# Patient Record
Sex: FEMALE | Race: OTHER | HISPANIC OR LATINO | URBAN - METROPOLITAN AREA
[De-identification: names, ages, dates, MRNs, and addresses within clinical notes are randomized per-mention and may not be internally consistent; named-entity substitution may affect disease eponyms.]

---

## 2024-10-17 ENCOUNTER — INPATIENT (INPATIENT)
Facility: HOSPITAL | Age: 64
LOS: 10 days | Discharge: HOME | DRG: 40 | End: 2024-10-28
Attending: INTERNAL MEDICINE | Admitting: STUDENT IN AN ORGANIZED HEALTH CARE EDUCATION/TRAINING PROGRAM
Payer: COMMERCIAL

## 2024-10-17 VITALS
HEART RATE: 113 BPM | OXYGEN SATURATION: 95 % | TEMPERATURE: 100 F | WEIGHT: 102.07 LBS | RESPIRATION RATE: 20 BRPM | SYSTOLIC BLOOD PRESSURE: 116 MMHG | DIASTOLIC BLOOD PRESSURE: 68 MMHG

## 2024-10-17 LAB
ALBUMIN SERPL ELPH-MCNC: 2.1 G/DL — LOW (ref 3.5–5.2)
ALP SERPL-CCNC: 112 U/L — SIGNIFICANT CHANGE UP (ref 30–115)
ALT FLD-CCNC: <5 U/L — SIGNIFICANT CHANGE UP (ref 0–41)
ANION GAP SERPL CALC-SCNC: 7 MMOL/L — SIGNIFICANT CHANGE UP (ref 7–14)
AST SERPL-CCNC: 29 U/L — SIGNIFICANT CHANGE UP (ref 0–41)
BASE EXCESS BLDV CALC-SCNC: 2.2 MMOL/L — SIGNIFICANT CHANGE UP (ref -2–3)
BILIRUB SERPL-MCNC: 0.5 MG/DL — SIGNIFICANT CHANGE UP (ref 0.2–1.2)
BUN SERPL-MCNC: 10 MG/DL — SIGNIFICANT CHANGE UP (ref 10–20)
CA-I SERPL-SCNC: 1.11 MMOL/L — LOW (ref 1.15–1.33)
CALCIUM SERPL-MCNC: 7.3 MG/DL — LOW (ref 8.4–10.5)
CHLORIDE SERPL-SCNC: 99 MMOL/L — SIGNIFICANT CHANGE UP (ref 98–110)
CO2 SERPL-SCNC: 25 MMOL/L — SIGNIFICANT CHANGE UP (ref 17–32)
CREAT SERPL-MCNC: 0.7 MG/DL — SIGNIFICANT CHANGE UP (ref 0.7–1.5)
EGFR: 97 ML/MIN/1.73M2 — SIGNIFICANT CHANGE UP
GAS PNL BLDV: 132 MMOL/L — LOW (ref 136–145)
GAS PNL BLDV: SIGNIFICANT CHANGE UP
GAS PNL BLDV: SIGNIFICANT CHANGE UP
GLUCOSE SERPL-MCNC: 115 MG/DL — HIGH (ref 70–99)
HCO3 BLDV-SCNC: 27 MMOL/L — SIGNIFICANT CHANGE UP (ref 22–29)
HCT VFR BLD CALC: 22.3 % — LOW (ref 37–47)
HCT VFR BLDA CALC: 22 % — LOW (ref 34.5–46.5)
HGB BLD CALC-MCNC: 7.2 G/DL — LOW (ref 11.7–16.1)
HGB BLD-MCNC: 6.6 G/DL — CRITICAL LOW (ref 12–16)
LACTATE BLDV-MCNC: 2.6 MMOL/L — HIGH (ref 0.5–2)
LACTATE SERPL-SCNC: 2.2 MMOL/L — HIGH (ref 0.7–2)
MAGNESIUM SERPL-MCNC: 1.9 MG/DL — SIGNIFICANT CHANGE UP (ref 1.8–2.4)
MCHC RBC-ENTMCNC: 24.6 PG — LOW (ref 27–31)
MCHC RBC-ENTMCNC: 29.6 G/DL — LOW (ref 32–37)
MCV RBC AUTO: 83.2 FL — SIGNIFICANT CHANGE UP (ref 81–99)
NT-PROBNP SERPL-SCNC: 481 PG/ML — HIGH (ref 0–300)
PCO2 BLDV: 44 MMHG — HIGH (ref 39–42)
PH BLDV: 7.4 — SIGNIFICANT CHANGE UP (ref 7.32–7.43)
PLATELET # BLD AUTO: 147 K/UL — SIGNIFICANT CHANGE UP (ref 130–400)
PMV BLD: 10.1 FL — SIGNIFICANT CHANGE UP (ref 7.4–10.4)
PO2 BLDV: 34 MMHG — SIGNIFICANT CHANGE UP (ref 25–45)
POTASSIUM BLDV-SCNC: 4.3 MMOL/L — SIGNIFICANT CHANGE UP (ref 3.5–5.1)
POTASSIUM SERPL-MCNC: 4.1 MMOL/L — SIGNIFICANT CHANGE UP (ref 3.5–5)
POTASSIUM SERPL-SCNC: 4.1 MMOL/L — SIGNIFICANT CHANGE UP (ref 3.5–5)
PROT SERPL-MCNC: 7.2 G/DL — SIGNIFICANT CHANGE UP (ref 6–8)
RBC # BLD: 2.68 M/UL — LOW (ref 4.2–5.4)
RBC # FLD: 18.6 % — HIGH (ref 11.5–14.5)
SAO2 % BLDV: 43.5 % — LOW (ref 67–88)
SODIUM SERPL-SCNC: 131 MMOL/L — LOW (ref 135–146)
TROPONIN T, HIGH SENSITIVITY RESULT: 10 NG/L — SIGNIFICANT CHANGE UP (ref 6–13)
WBC # BLD: 4.96 K/UL — SIGNIFICANT CHANGE UP (ref 4.8–10.8)
WBC # FLD AUTO: 4.96 K/UL — SIGNIFICANT CHANGE UP (ref 4.8–10.8)

## 2024-10-17 PROCEDURE — 93325 DOPPLER ECHO COLOR FLOW MAPG: CPT

## 2024-10-17 PROCEDURE — 86362 MOG-IGG1 ANTB CBA EACH: CPT

## 2024-10-17 PROCEDURE — 82378 CARCINOEMBRYONIC ANTIGEN: CPT

## 2024-10-17 PROCEDURE — 93005 ELECTROCARDIOGRAM TRACING: CPT

## 2024-10-17 PROCEDURE — 86038 ANTINUCLEAR ANTIBODIES: CPT

## 2024-10-17 PROCEDURE — 86901 BLOOD TYPING SEROLOGIC RH(D): CPT

## 2024-10-17 PROCEDURE — 85303 CLOT INHIBIT PROT C ACTIVITY: CPT

## 2024-10-17 PROCEDURE — 36430 TRANSFUSION BLD/BLD COMPNT: CPT

## 2024-10-17 PROCEDURE — 87116 MYCOBACTERIA CULTURE: CPT

## 2024-10-17 PROCEDURE — 70450 CT HEAD/BRAIN W/O DYE: CPT | Mod: MC

## 2024-10-17 PROCEDURE — 83735 ASSAY OF MAGNESIUM: CPT

## 2024-10-17 PROCEDURE — 87205 SMEAR GRAM STAIN: CPT

## 2024-10-17 PROCEDURE — 84443 ASSAY THYROID STIM HORMONE: CPT

## 2024-10-17 PROCEDURE — 83090 ASSAY OF HOMOCYSTEINE: CPT

## 2024-10-17 PROCEDURE — 86147 CARDIOLIPIN ANTIBODY EA IG: CPT

## 2024-10-17 PROCEDURE — 83010 ASSAY OF HAPTOGLOBIN QUANT: CPT

## 2024-10-17 PROCEDURE — 93970 EXTREMITY STUDY: CPT

## 2024-10-17 PROCEDURE — 85045 AUTOMATED RETICULOCYTE COUNT: CPT

## 2024-10-17 PROCEDURE — 83516 IMMUNOASSAY NONANTIBODY: CPT

## 2024-10-17 PROCEDURE — 87015 SPECIMEN INFECT AGNT CONCNTJ: CPT

## 2024-10-17 PROCEDURE — 99285 EMERGENCY DEPT VISIT HI MDM: CPT

## 2024-10-17 PROCEDURE — A9579: CPT

## 2024-10-17 PROCEDURE — 80074 ACUTE HEPATITIS PANEL: CPT

## 2024-10-17 PROCEDURE — 85300 ANTITHROMBIN III ACTIVITY: CPT

## 2024-10-17 PROCEDURE — 86235 NUCLEAR ANTIGEN ANTIBODY: CPT

## 2024-10-17 PROCEDURE — 80048 BASIC METABOLIC PNL TOTAL CA: CPT

## 2024-10-17 PROCEDURE — 72156 MRI NECK SPINE W/O & W/DYE: CPT | Mod: MC

## 2024-10-17 PROCEDURE — 93010 ELECTROCARDIOGRAM REPORT: CPT

## 2024-10-17 PROCEDURE — 80053 COMPREHEN METABOLIC PANEL: CPT

## 2024-10-17 PROCEDURE — 85307 ASSAY ACTIVATED PROTEIN C: CPT

## 2024-10-17 PROCEDURE — 86301 IMMUNOASSAY TUMOR CA 19-9: CPT

## 2024-10-17 PROCEDURE — 85730 THROMBOPLASTIN TIME PARTIAL: CPT

## 2024-10-17 PROCEDURE — 84156 ASSAY OF PROTEIN URINE: CPT

## 2024-10-17 PROCEDURE — 88184 FLOWCYTOMETRY/ TC 1 MARKER: CPT

## 2024-10-17 PROCEDURE — 86850 RBC ANTIBODY SCREEN: CPT

## 2024-10-17 PROCEDURE — 83519 RIA NONANTIBODY: CPT

## 2024-10-17 PROCEDURE — 74177 CT ABD & PELVIS W/CONTRAST: CPT | Mod: MC

## 2024-10-17 PROCEDURE — 83521 IG LIGHT CHAINS FREE EACH: CPT

## 2024-10-17 PROCEDURE — 83550 IRON BINDING TEST: CPT

## 2024-10-17 PROCEDURE — 86780 TREPONEMA PALLIDUM: CPT

## 2024-10-17 PROCEDURE — 86140 C-REACTIVE PROTEIN: CPT

## 2024-10-17 PROCEDURE — 89051 BODY FLUID CELL COUNT: CPT

## 2024-10-17 PROCEDURE — 83540 ASSAY OF IRON: CPT

## 2024-10-17 PROCEDURE — 85025 COMPLETE CBC W/AUTO DIFF WBC: CPT

## 2024-10-17 PROCEDURE — 86431 RHEUMATOID FACTOR QUANT: CPT

## 2024-10-17 PROCEDURE — 86341 ISLET CELL ANTIBODY: CPT

## 2024-10-17 PROCEDURE — 82040 ASSAY OF SERUM ALBUMIN: CPT

## 2024-10-17 PROCEDURE — 70498 CT ANGIOGRAPHY NECK: CPT | Mod: MC

## 2024-10-17 PROCEDURE — 84481 FREE ASSAY (FT-3): CPT

## 2024-10-17 PROCEDURE — 84165 PROTEIN E-PHORESIS SERUM: CPT

## 2024-10-17 PROCEDURE — 70496 CT ANGIOGRAPHY HEAD: CPT | Mod: MC

## 2024-10-17 PROCEDURE — 86225 DNA ANTIBODY NATIVE: CPT

## 2024-10-17 PROCEDURE — 85613 RUSSELL VIPER VENOM DILUTED: CPT

## 2024-10-17 PROCEDURE — 84157 ASSAY OF PROTEIN OTHER: CPT

## 2024-10-17 PROCEDURE — 86304 IMMUNOASSAY TUMOR CA 125: CPT

## 2024-10-17 PROCEDURE — 87070 CULTURE OTHR SPECIMN AEROBIC: CPT

## 2024-10-17 PROCEDURE — 71045 X-RAY EXAM CHEST 1 VIEW: CPT | Mod: 26

## 2024-10-17 PROCEDURE — 82570 ASSAY OF URINE CREATININE: CPT

## 2024-10-17 PROCEDURE — 0241U: CPT

## 2024-10-17 PROCEDURE — 82728 ASSAY OF FERRITIN: CPT

## 2024-10-17 PROCEDURE — 93320 DOPPLER ECHO COMPLETE: CPT

## 2024-10-17 PROCEDURE — C1764: CPT

## 2024-10-17 PROCEDURE — 86618 LYME DISEASE ANTIBODY: CPT

## 2024-10-17 PROCEDURE — 82105 ALPHA-FETOPROTEIN SERUM: CPT

## 2024-10-17 PROCEDURE — 86146 BETA-2 GLYCOPROTEIN ANTIBODY: CPT

## 2024-10-17 PROCEDURE — 82164 ANGIOTENSIN I ENZYME TEST: CPT

## 2024-10-17 PROCEDURE — 82525 ASSAY OF COPPER: CPT

## 2024-10-17 PROCEDURE — 85302 CLOT INHIBIT PROT C ANTIGEN: CPT

## 2024-10-17 PROCEDURE — 72157 MRI CHEST SPINE W/O & W/DYE: CPT | Mod: MC

## 2024-10-17 PROCEDURE — 82746 ASSAY OF FOLIC ACID SERUM: CPT

## 2024-10-17 PROCEDURE — 93312 ECHO TRANSESOPHAGEAL: CPT

## 2024-10-17 PROCEDURE — 82390 ASSAY OF CERULOPLASMIN: CPT

## 2024-10-17 PROCEDURE — 85652 RBC SED RATE AUTOMATED: CPT

## 2024-10-17 PROCEDURE — 86036 ANCA SCREEN EACH ANTIBODY: CPT

## 2024-10-17 PROCEDURE — 81001 URINALYSIS AUTO W/SCOPE: CPT

## 2024-10-17 PROCEDURE — 86255 FLUORESCENT ANTIBODY SCREEN: CPT

## 2024-10-17 PROCEDURE — 83605 ASSAY OF LACTIC ACID: CPT

## 2024-10-17 PROCEDURE — 86880 COOMBS TEST DIRECT: CPT

## 2024-10-17 PROCEDURE — 84155 ASSAY OF PROTEIN SERUM: CPT

## 2024-10-17 PROCEDURE — 70553 MRI BRAIN STEM W/O & W/DYE: CPT | Mod: MC

## 2024-10-17 PROCEDURE — 82607 VITAMIN B-12: CPT

## 2024-10-17 PROCEDURE — 87389 HIV-1 AG W/HIV-1&-2 AB AG IA: CPT

## 2024-10-17 PROCEDURE — 93998 UNLISTD NONINVAS VASC DX STD: CPT

## 2024-10-17 PROCEDURE — P9016: CPT

## 2024-10-17 PROCEDURE — 86403 PARTICLE AGGLUT ANTBDY SCRN: CPT

## 2024-10-17 PROCEDURE — 82945 GLUCOSE OTHER FLUID: CPT

## 2024-10-17 PROCEDURE — 85301 ANTITHROMBIN III ANTIGEN: CPT

## 2024-10-17 PROCEDURE — 83615 LACTATE (LD) (LDH) ENZYME: CPT

## 2024-10-17 PROCEDURE — 88108 CYTOPATH CONCENTRATE TECH: CPT

## 2024-10-17 PROCEDURE — 82595 ASSAY OF CRYOGLOBULIN: CPT

## 2024-10-17 PROCEDURE — 87476 LYME DIS DNA AMP PROBE: CPT

## 2024-10-17 PROCEDURE — 86160 COMPLEMENT ANTIGEN: CPT

## 2024-10-17 PROCEDURE — 85306 CLOT INHIBIT PROT S FREE: CPT

## 2024-10-17 PROCEDURE — 82784 ASSAY IGA/IGD/IGG/IGM EACH: CPT

## 2024-10-17 PROCEDURE — 36415 COLL VENOUS BLD VENIPUNCTURE: CPT

## 2024-10-17 PROCEDURE — 71275 CT ANGIOGRAPHY CHEST: CPT | Mod: MC

## 2024-10-17 PROCEDURE — 86900 BLOOD TYPING SEROLOGIC ABO: CPT

## 2024-10-17 PROCEDURE — 87102 FUNGUS ISOLATION CULTURE: CPT

## 2024-10-17 PROCEDURE — 86334 IMMUNOFIX E-PHORESIS SERUM: CPT

## 2024-10-17 PROCEDURE — 88185 FLOWCYTOMETRY/TC ADD-ON: CPT

## 2024-10-17 PROCEDURE — 84145 PROCALCITONIN (PCT): CPT

## 2024-10-17 PROCEDURE — 82042 OTHER SOURCE ALBUMIN QUAN EA: CPT

## 2024-10-17 PROCEDURE — 84436 ASSAY OF TOTAL THYROXINE: CPT

## 2024-10-17 PROCEDURE — 33285 INSJ SUBQ CAR RHYTHM MNTR: CPT

## 2024-10-17 PROCEDURE — 87799 DETECT AGENT NOS DNA QUANT: CPT

## 2024-10-17 PROCEDURE — 85027 COMPLETE CBC AUTOMATED: CPT

## 2024-10-17 PROCEDURE — 93306 TTE W/DOPPLER COMPLETE: CPT

## 2024-10-17 PROCEDURE — 86300 IMMUNOASSAY TUMOR CA 15-3: CPT

## 2024-10-17 PROCEDURE — 87483 CNS DNA AMP PROBE TYPE 12-25: CPT

## 2024-10-17 RX ORDER — AZTREONAM 75 MG/ML
1000 KIT INHALATION ONCE
Refills: 0 | Status: COMPLETED | OUTPATIENT
Start: 2024-10-17 | End: 2024-10-17

## 2024-10-17 RX ORDER — VANCOMYCIN HYDROCHLORIDE 50 MG/ML
1000 KIT ORAL ONCE
Refills: 0 | Status: COMPLETED | OUTPATIENT
Start: 2024-10-17 | End: 2024-10-17

## 2024-10-17 RX ORDER — ACETAMINOPHEN 500 MG
975 TABLET ORAL ONCE
Refills: 0 | Status: COMPLETED | OUTPATIENT
Start: 2024-10-17 | End: 2024-10-17

## 2024-10-17 RX ADMIN — Medication 1000 MILLILITER(S): at 23:40

## 2024-10-17 RX ADMIN — AZTREONAM 50 MILLIGRAM(S): KIT at 22:37

## 2024-10-17 RX ADMIN — VANCOMYCIN HYDROCHLORIDE 250 MILLIGRAM(S): KIT at 22:32

## 2024-10-17 RX ADMIN — Medication 1000 MILLILITER(S): at 22:32

## 2024-10-17 RX ADMIN — Medication 975 MILLIGRAM(S): at 22:32

## 2024-10-17 NOTE — ED PROVIDER NOTE - PHYSICAL EXAMINATION
Constitutional: pale otherwise appearing. No acute distress. Non toxic.   Eyes: PERRLA. Extraocular movements intact, no entrapment. Conjunctiva normal.   ENT: No nasal discharge. dry mucus membranes.  Neck: Supple, non tender, full range of motion.  CV: RRR no murmurs, rubs, or gallops. +S1S2.   Pulm: Clear to auscultation bilaterally. Normal work of breathing.  Abd: soft NT ND +BS.   Ext: Warm and well perfused x4, moving all extremities, 2+ pitting edema to the bilateral lower extremities   Psy: Cooperative, appropriate.   Skin: Warm, dry, no rash  Neuro: nonfocal Constitutional: pale otherwise appearing. No acute distress. Non toxic.   Eyes: PERRLA. Extraocular movements intact, no entrapment. Conjunctiva normal.   ENT: No nasal discharge. dry mucus membranes.  Neck: Supple, non tender, full range of motion.  CV: regular, tachycardic no murmurs, rubs, or gallops. +S1S2.   Pulm: Clear to auscultation bilaterally. Normal work of breathing.  Abd: soft NT ND +BS.   Ext: Warm and well perfused x4, moving all extremities, 2+ pitting edema to the bilateral lower extremities   Psy: Cooperative, appropriate.   Skin: Warm, dry, no rash  Neuro: nonfocal

## 2024-10-17 NOTE — ED ADULT NURSE NOTE - OBJECTIVE STATEMENT
pt sent in by pmd for low hgb 7.5 from labs on 10/5  hx anemia    pt noted with BLE swelling (grade 1)

## 2024-10-17 NOTE — ED PROVIDER NOTE - PROGRESS NOTE DETAILS
Rectal temp 101.9.  Blood cultures added on, limited fluid bolus secondary to potential fluid overload.  Empiric antibiotics ordered. Hemoglobin noted.  SHAY negative for bright red blood per rectum, melena.  Patient consented for blood

## 2024-10-17 NOTE — ED PROVIDER NOTE - OBJECTIVE STATEMENT
63-year-old female history of hypertension dyslipidemia hypothyroidism lipoma, cardiac myxoma resection approximately 4 years ago presenting for evaluation of generalized weakness, dizziness, lightheadedness, bilateral lower extremity swelling.  States that symptoms have been ongoing for several months getting progressively worse.  Saw her cardiologist Dr. Portillo today, reviewed outpatient blood work from October 5 that noted anemia with a hemoglobin of 7.5.  States that she notes no bleeding.  No bright red blood per rectum.  No melena.  Declining SHAY at this time.  Denies all other bleeding.  States that she was recently started on Vascepa 2 weeks ago.  No other changes to medications.  No history of congestive heart failure, fluid retention.  No GI symptoms urinary symptoms.

## 2024-10-17 NOTE — ED PROVIDER NOTE - AVIAN FLU SYMPTOMS
Patient transferred to wrong pool. Patient notified Call chemistry panel normal.  CBC normal.  Blood sugar 102.  Cholesterol 188.  .  Previous history of CVA,  LDL goal should be less than 70.  Recommend increase pravastatin dosage to 40 mg a day.    New script sent to pharmacy.     Please document as we do not have access to the pool.     No

## 2024-10-17 NOTE — ED PROVIDER NOTE - CLINICAL SUMMARY MEDICAL DECISION MAKING FREE TEXT BOX
63-year-old female history of hypertension dyslipidemia hypothyroidism lipoma, cardiac myxoma resection approximately 4 years ago presenting for evaluation of generalized weakness, dizziness, lightheadedness, bilateral lower extremity swelling.  States that symptoms have been ongoing for several months getting progressively worse.  Saw her cardiologist Dr. Portillo today, reviewed outpatient blood work from October 5 that noted anemia with a hemoglobin of 7.5.  States that she notes no bleeding.  No bright red blood per rectum.  No melena.  Declining SHAY at this time.  Denies all other bleeding.  States that she was recently started on Vascepa 2 weeks ago.  No other changes to medications.  No history of congestive heart failure, fluid retention.  No GI symptoms urinary symptoms. labs imaging ekg reviewed. see progress notes as above. will admit for further eval

## 2024-10-18 DIAGNOSIS — R50.9 FEVER, UNSPECIFIED: ICD-10-CM

## 2024-10-18 LAB
ABO RH CONFIRMATION: SIGNIFICANT CHANGE UP
ALBUMIN SERPL ELPH-MCNC: 2.1 G/DL — LOW (ref 3.5–5.2)
ALBUMIN SERPL ELPH-MCNC: 2.2 G/DL — LOW (ref 3.5–5.2)
ALP SERPL-CCNC: 126 U/L — HIGH (ref 30–115)
ALP SERPL-CCNC: 127 U/L — HIGH (ref 30–115)
ALT FLD-CCNC: 5 U/L — SIGNIFICANT CHANGE UP (ref 0–41)
ALT FLD-CCNC: <5 U/L — SIGNIFICANT CHANGE UP (ref 0–41)
ANION GAP SERPL CALC-SCNC: 11 MMOL/L — SIGNIFICANT CHANGE UP (ref 7–14)
ANION GAP SERPL CALC-SCNC: 7 MMOL/L — SIGNIFICANT CHANGE UP (ref 7–14)
ANION GAP SERPL CALC-SCNC: 9 MMOL/L — SIGNIFICANT CHANGE UP (ref 7–14)
ANISOCYTOSIS BLD QL: SLIGHT — SIGNIFICANT CHANGE UP
APPEARANCE UR: CLEAR — SIGNIFICANT CHANGE UP
AST SERPL-CCNC: 22 U/L — SIGNIFICANT CHANGE UP (ref 0–41)
AST SERPL-CCNC: 25 U/L — SIGNIFICANT CHANGE UP (ref 0–41)
BACTERIA # UR AUTO: NEGATIVE /HPF — SIGNIFICANT CHANGE UP
BASOPHILS # BLD AUTO: 0 K/UL — SIGNIFICANT CHANGE UP (ref 0–0.2)
BASOPHILS # BLD AUTO: 0.01 K/UL — SIGNIFICANT CHANGE UP (ref 0–0.2)
BASOPHILS # BLD AUTO: 0.01 K/UL — SIGNIFICANT CHANGE UP (ref 0–0.2)
BASOPHILS # BLD AUTO: 0.02 K/UL — SIGNIFICANT CHANGE UP (ref 0–0.2)
BASOPHILS NFR BLD AUTO: 0 % — SIGNIFICANT CHANGE UP (ref 0–1)
BASOPHILS NFR BLD AUTO: 0.2 % — SIGNIFICANT CHANGE UP (ref 0–1)
BASOPHILS NFR BLD AUTO: 0.2 % — SIGNIFICANT CHANGE UP (ref 0–1)
BASOPHILS NFR BLD AUTO: 0.4 % — SIGNIFICANT CHANGE UP (ref 0–1)
BILIRUB SERPL-MCNC: 0.7 MG/DL — SIGNIFICANT CHANGE UP (ref 0.2–1.2)
BILIRUB SERPL-MCNC: 0.9 MG/DL — SIGNIFICANT CHANGE UP (ref 0.2–1.2)
BILIRUB UR-MCNC: NEGATIVE — SIGNIFICANT CHANGE UP
BLD GP AB SCN SERPL QL: SIGNIFICANT CHANGE UP
BUN SERPL-MCNC: 8 MG/DL — LOW (ref 10–20)
BUN SERPL-MCNC: 8 MG/DL — LOW (ref 10–20)
BUN SERPL-MCNC: 9 MG/DL — LOW (ref 10–20)
CALCIUM SERPL-MCNC: 7.5 MG/DL — LOW (ref 8.4–10.5)
CALCIUM SERPL-MCNC: 7.7 MG/DL — LOW (ref 8.4–10.5)
CALCIUM SERPL-MCNC: 7.7 MG/DL — LOW (ref 8.4–10.5)
CAST: 1 /LPF — SIGNIFICANT CHANGE UP (ref 0–4)
CHLORIDE SERPL-SCNC: 100 MMOL/L — SIGNIFICANT CHANGE UP (ref 98–110)
CHLORIDE SERPL-SCNC: 95 MMOL/L — LOW (ref 98–110)
CHLORIDE SERPL-SCNC: 95 MMOL/L — LOW (ref 98–110)
CO2 SERPL-SCNC: 26 MMOL/L — SIGNIFICANT CHANGE UP (ref 17–32)
CO2 SERPL-SCNC: 26 MMOL/L — SIGNIFICANT CHANGE UP (ref 17–32)
CO2 SERPL-SCNC: 27 MMOL/L — SIGNIFICANT CHANGE UP (ref 17–32)
COLOR SPEC: YELLOW — SIGNIFICANT CHANGE UP
CREAT SERPL-MCNC: 0.6 MG/DL — LOW (ref 0.7–1.5)
CREAT SERPL-MCNC: 0.6 MG/DL — LOW (ref 0.7–1.5)
CREAT SERPL-MCNC: 0.7 MG/DL — SIGNIFICANT CHANGE UP (ref 0.7–1.5)
DIFF PNL FLD: ABNORMAL
EGFR: 100 ML/MIN/1.73M2 — SIGNIFICANT CHANGE UP
EGFR: 100 ML/MIN/1.73M2 — SIGNIFICANT CHANGE UP
EGFR: 97 ML/MIN/1.73M2 — SIGNIFICANT CHANGE UP
EOSINOPHIL # BLD AUTO: 0 K/UL — SIGNIFICANT CHANGE UP (ref 0–0.7)
EOSINOPHIL NFR BLD AUTO: 0 % — SIGNIFICANT CHANGE UP (ref 0–8)
FLUAV AG NPH QL: SIGNIFICANT CHANGE UP
FLUBV AG NPH QL: SIGNIFICANT CHANGE UP
GLUCOSE SERPL-MCNC: 106 MG/DL — HIGH (ref 70–99)
GLUCOSE SERPL-MCNC: 79 MG/DL — SIGNIFICANT CHANGE UP (ref 70–99)
GLUCOSE SERPL-MCNC: 94 MG/DL — SIGNIFICANT CHANGE UP (ref 70–99)
GLUCOSE UR QL: NEGATIVE MG/DL — SIGNIFICANT CHANGE UP
HCT VFR BLD CALC: 29.3 % — LOW (ref 37–47)
HCT VFR BLD CALC: 30.7 % — LOW (ref 37–47)
HCT VFR BLD CALC: 31.5 % — LOW (ref 37–47)
HGB BLD-MCNC: 9.2 G/DL — LOW (ref 12–16)
HGB BLD-MCNC: 9.5 G/DL — LOW (ref 12–16)
HGB BLD-MCNC: 9.9 G/DL — LOW (ref 12–16)
HYPOCHROMIA BLD QL: SIGNIFICANT CHANGE UP
IMM GRANULOCYTES NFR BLD AUTO: 0.4 % — HIGH (ref 0.1–0.3)
IMM GRANULOCYTES NFR BLD AUTO: 1 % — HIGH (ref 0.1–0.3)
IMM GRANULOCYTES NFR BLD AUTO: 2.9 % — HIGH (ref 0.1–0.3)
KETONES UR-MCNC: NEGATIVE MG/DL — SIGNIFICANT CHANGE UP
LACTATE SERPL-SCNC: 2.5 MMOL/L — HIGH (ref 0.7–2)
LEUKOCYTE ESTERASE UR-ACNC: NEGATIVE — SIGNIFICANT CHANGE UP
LYMPHOCYTES # BLD AUTO: 0.64 K/UL — LOW (ref 1.2–3.4)
LYMPHOCYTES # BLD AUTO: 0.76 K/UL — LOW (ref 1.2–3.4)
LYMPHOCYTES # BLD AUTO: 1.17 K/UL — LOW (ref 1.2–3.4)
LYMPHOCYTES # BLD AUTO: 1.4 K/UL — SIGNIFICANT CHANGE UP (ref 1.2–3.4)
LYMPHOCYTES # BLD AUTO: 13 % — LOW (ref 20.5–51.1)
LYMPHOCYTES # BLD AUTO: 18.4 % — LOW (ref 20.5–51.1)
LYMPHOCYTES # BLD AUTO: 22.3 % — SIGNIFICANT CHANGE UP (ref 20.5–51.1)
LYMPHOCYTES # BLD AUTO: 25.3 % — SIGNIFICANT CHANGE UP (ref 20.5–51.1)
MAGNESIUM SERPL-MCNC: 1.8 MG/DL — SIGNIFICANT CHANGE UP (ref 1.8–2.4)
MANUAL SMEAR VERIFICATION: SIGNIFICANT CHANGE UP
MCHC RBC-ENTMCNC: 26.1 PG — LOW (ref 27–31)
MCHC RBC-ENTMCNC: 26.1 PG — LOW (ref 27–31)
MCHC RBC-ENTMCNC: 26.4 PG — LOW (ref 27–31)
MCHC RBC-ENTMCNC: 30.9 G/DL — LOW (ref 32–37)
MCHC RBC-ENTMCNC: 31.4 G/DL — LOW (ref 32–37)
MCHC RBC-ENTMCNC: 31.4 G/DL — LOW (ref 32–37)
MCV RBC AUTO: 83 FL — SIGNIFICANT CHANGE UP (ref 81–99)
MCV RBC AUTO: 83.1 FL — SIGNIFICANT CHANGE UP (ref 81–99)
MCV RBC AUTO: 85.3 FL — SIGNIFICANT CHANGE UP (ref 81–99)
MICROCYTES BLD QL: SLIGHT — SIGNIFICANT CHANGE UP
MONOCYTES # BLD AUTO: 0.43 K/UL — SIGNIFICANT CHANGE UP (ref 0.1–0.6)
MONOCYTES # BLD AUTO: 0.81 K/UL — HIGH (ref 0.1–0.6)
MONOCYTES # BLD AUTO: 0.84 K/UL — HIGH (ref 0.1–0.6)
MONOCYTES # BLD AUTO: 0.92 K/UL — HIGH (ref 0.1–0.6)
MONOCYTES NFR BLD AUTO: 16 % — HIGH (ref 1.7–9.3)
MONOCYTES NFR BLD AUTO: 16.6 % — HIGH (ref 1.7–9.3)
MONOCYTES NFR BLD AUTO: 19.7 % — HIGH (ref 1.7–9.3)
MONOCYTES NFR BLD AUTO: 8.7 % — SIGNIFICANT CHANGE UP (ref 1.7–9.3)
NEUTROPHILS # BLD AUTO: 2.42 K/UL — SIGNIFICANT CHANGE UP (ref 1.4–6.5)
NEUTROPHILS # BLD AUTO: 3.17 K/UL — SIGNIFICANT CHANGE UP (ref 1.4–6.5)
NEUTROPHILS # BLD AUTO: 3.18 K/UL — SIGNIFICANT CHANGE UP (ref 1.4–6.5)
NEUTROPHILS # BLD AUTO: 3.88 K/UL — SIGNIFICANT CHANGE UP (ref 1.4–6.5)
NEUTROPHILS NFR BLD AUTO: 57.3 % — SIGNIFICANT CHANGE UP (ref 42.2–75.2)
NEUTROPHILS NFR BLD AUTO: 58.8 % — SIGNIFICANT CHANGE UP (ref 42.2–75.2)
NEUTROPHILS NFR BLD AUTO: 60.5 % — SIGNIFICANT CHANGE UP (ref 42.2–75.2)
NEUTROPHILS NFR BLD AUTO: 78.3 % — HIGH (ref 42.2–75.2)
NITRITE UR-MCNC: NEGATIVE — SIGNIFICANT CHANGE UP
NRBC # BLD: 0 /100 WBCS — SIGNIFICANT CHANGE UP (ref 0–0)
PH UR: 6.5 — SIGNIFICANT CHANGE UP (ref 5–8)
PLAT MORPH BLD: NORMAL — SIGNIFICANT CHANGE UP
PLATELET # BLD AUTO: 144 K/UL — SIGNIFICANT CHANGE UP (ref 130–400)
PLATELET # BLD AUTO: 146 K/UL — SIGNIFICANT CHANGE UP (ref 130–400)
PLATELET # BLD AUTO: 150 K/UL — SIGNIFICANT CHANGE UP (ref 130–400)
PLATELET COUNT - ESTIMATE: NORMAL — SIGNIFICANT CHANGE UP
PMV BLD: 10 FL — SIGNIFICANT CHANGE UP (ref 7.4–10.4)
PMV BLD: 9.7 FL — SIGNIFICANT CHANGE UP (ref 7.4–10.4)
PMV BLD: 9.9 FL — SIGNIFICANT CHANGE UP (ref 7.4–10.4)
POLYCHROMASIA BLD QL SMEAR: SLIGHT — SIGNIFICANT CHANGE UP
POTASSIUM SERPL-MCNC: 3.7 MMOL/L — SIGNIFICANT CHANGE UP (ref 3.5–5)
POTASSIUM SERPL-MCNC: 3.7 MMOL/L — SIGNIFICANT CHANGE UP (ref 3.5–5)
POTASSIUM SERPL-MCNC: 3.8 MMOL/L — SIGNIFICANT CHANGE UP (ref 3.5–5)
POTASSIUM SERPL-SCNC: 3.7 MMOL/L — SIGNIFICANT CHANGE UP (ref 3.5–5)
POTASSIUM SERPL-SCNC: 3.7 MMOL/L — SIGNIFICANT CHANGE UP (ref 3.5–5)
POTASSIUM SERPL-SCNC: 3.8 MMOL/L — SIGNIFICANT CHANGE UP (ref 3.5–5)
PROT SERPL-MCNC: 7.1 G/DL — SIGNIFICANT CHANGE UP (ref 6–8)
PROT SERPL-MCNC: 7.4 G/DL — SIGNIFICANT CHANGE UP (ref 6–8)
PROT UR-MCNC: 30 MG/DL
RBC # BLD: 3.53 M/UL — LOW (ref 4.2–5.4)
RBC # BLD: 3.6 M/UL — LOW (ref 4.2–5.4)
RBC # BLD: 3.6 M/UL — LOW (ref 4.2–5.4)
RBC # BLD: 3.79 M/UL — LOW (ref 4.2–5.4)
RBC # FLD: 17.5 % — HIGH (ref 11.5–14.5)
RBC # FLD: 17.6 % — HIGH (ref 11.5–14.5)
RBC # FLD: 17.6 % — HIGH (ref 11.5–14.5)
RBC BLD AUTO: ABNORMAL
RBC CASTS # UR COMP ASSIST: 4 /HPF — SIGNIFICANT CHANGE UP (ref 0–4)
RETICS #: 95 K/UL — SIGNIFICANT CHANGE UP (ref 25–125)
RETICS/RBC NFR: 2.6 % — HIGH (ref 0.5–1.5)
RSV RNA NPH QL NAA+NON-PROBE: SIGNIFICANT CHANGE UP
SARS-COV-2 RNA SPEC QL NAA+PROBE: SIGNIFICANT CHANGE UP
SMUDGE CELLS # BLD: PRESENT — SIGNIFICANT CHANGE UP
SODIUM SERPL-SCNC: 130 MMOL/L — LOW (ref 135–146)
SODIUM SERPL-SCNC: 132 MMOL/L — LOW (ref 135–146)
SODIUM SERPL-SCNC: 134 MMOL/L — LOW (ref 135–146)
SP GR SPEC: 1.02 — SIGNIFICANT CHANGE UP (ref 1–1.03)
SQUAMOUS # UR AUTO: 1 /HPF — SIGNIFICANT CHANGE UP (ref 0–5)
UROBILINOGEN FLD QL: 2 MG/DL (ref 0.2–1)
WBC # BLD: 4.12 K/UL — LOW (ref 4.8–10.8)
WBC # BLD: 5.24 K/UL — SIGNIFICANT CHANGE UP (ref 4.8–10.8)
WBC # BLD: 5.54 K/UL — SIGNIFICANT CHANGE UP (ref 4.8–10.8)
WBC # FLD AUTO: 4.12 K/UL — LOW (ref 4.8–10.8)
WBC # FLD AUTO: 5.24 K/UL — SIGNIFICANT CHANGE UP (ref 4.8–10.8)
WBC # FLD AUTO: 5.54 K/UL — SIGNIFICANT CHANGE UP (ref 4.8–10.8)
WBC UR QL: 1 /HPF — SIGNIFICANT CHANGE UP (ref 0–5)

## 2024-10-18 PROCEDURE — 99223 1ST HOSP IP/OBS HIGH 75: CPT | Mod: GC

## 2024-10-18 PROCEDURE — 93306 TTE W/DOPPLER COMPLETE: CPT | Mod: 26

## 2024-10-18 PROCEDURE — 74177 CT ABD & PELVIS W/CONTRAST: CPT | Mod: 26

## 2024-10-18 PROCEDURE — 93970 EXTREMITY STUDY: CPT | Mod: 26

## 2024-10-18 PROCEDURE — 93010 ELECTROCARDIOGRAM REPORT: CPT

## 2024-10-18 PROCEDURE — 70450 CT HEAD/BRAIN W/O DYE: CPT | Mod: 26

## 2024-10-18 RX ORDER — MELATONIN 5 MG
3 TABLET ORAL AT BEDTIME
Refills: 0 | Status: DISCONTINUED | OUTPATIENT
Start: 2024-10-18 | End: 2024-10-28

## 2024-10-18 RX ORDER — ASPIRIN/MAG CARB/ALUMINUM AMIN 325 MG
81 TABLET ORAL DAILY
Refills: 0 | Status: DISCONTINUED | OUTPATIENT
Start: 2024-10-18 | End: 2024-10-18

## 2024-10-18 RX ORDER — METOPROLOL TARTRATE 50 MG
50 TABLET ORAL DAILY
Refills: 0 | Status: DISCONTINUED | OUTPATIENT
Start: 2024-10-18 | End: 2024-10-28

## 2024-10-18 RX ORDER — CHLORHEXIDINE GLUCONATE 40 MG/ML
1 SOLUTION TOPICAL
Refills: 0 | Status: DISCONTINUED | OUTPATIENT
Start: 2024-10-18 | End: 2024-10-28

## 2024-10-18 RX ORDER — FUROSEMIDE 40 MG
40 TABLET ORAL ONCE
Refills: 0 | Status: DISCONTINUED | OUTPATIENT
Start: 2024-10-18 | End: 2024-10-18

## 2024-10-18 RX ORDER — ENOXAPARIN SODIUM 40MG/0.4ML
40 SYRINGE (ML) SUBCUTANEOUS EVERY 24 HOURS
Refills: 0 | Status: DISCONTINUED | OUTPATIENT
Start: 2024-10-18 | End: 2024-10-20

## 2024-10-18 RX ORDER — INFLUENZ VIR VAC TV P-SURF2003 15MCG/.5ML
0.5 SYRINGE (ML) INTRAMUSCULAR ONCE
Refills: 0 | Status: COMPLETED | OUTPATIENT
Start: 2024-10-18 | End: 2024-10-18

## 2024-10-18 RX ORDER — ACETAMINOPHEN 500 MG
650 TABLET ORAL EVERY 6 HOURS
Refills: 0 | Status: DISCONTINUED | OUTPATIENT
Start: 2024-10-18 | End: 2024-10-28

## 2024-10-18 RX ORDER — SENNA 187 MG
2 TABLET ORAL AT BEDTIME
Refills: 0 | Status: DISCONTINUED | OUTPATIENT
Start: 2024-10-18 | End: 2024-10-28

## 2024-10-18 RX ORDER — FUROSEMIDE 40 MG
40 TABLET ORAL ONCE
Refills: 0 | Status: COMPLETED | OUTPATIENT
Start: 2024-10-18 | End: 2024-10-18

## 2024-10-18 RX ORDER — POLYETHYLENE GLYCOL 3350 17 G/17G
17 POWDER, FOR SOLUTION ORAL EVERY 12 HOURS
Refills: 0 | Status: DISCONTINUED | OUTPATIENT
Start: 2024-10-18 | End: 2024-10-28

## 2024-10-18 RX ORDER — LEVOTHYROXINE SODIUM 88 MCG
50 TABLET ORAL DAILY
Refills: 0 | Status: DISCONTINUED | OUTPATIENT
Start: 2024-10-18 | End: 2024-10-28

## 2024-10-18 RX ORDER — ENOXAPARIN SODIUM 40MG/0.4ML
40 SYRINGE (ML) SUBCUTANEOUS EVERY 12 HOURS
Refills: 0 | Status: DISCONTINUED | OUTPATIENT
Start: 2024-10-18 | End: 2024-10-18

## 2024-10-18 RX ORDER — PANTOPRAZOLE SODIUM 40 MG/1
40 TABLET, DELAYED RELEASE ORAL
Refills: 0 | Status: DISCONTINUED | OUTPATIENT
Start: 2024-10-18 | End: 2024-10-28

## 2024-10-18 RX ADMIN — Medication 50 MICROGRAM(S): at 05:51

## 2024-10-18 RX ADMIN — Medication 40 MILLIGRAM(S): at 08:56

## 2024-10-18 RX ADMIN — Medication 7.5 MILLILITER(S): at 17:20

## 2024-10-18 RX ADMIN — CHLORHEXIDINE GLUCONATE 1 APPLICATION(S): 40 SOLUTION TOPICAL at 17:20

## 2024-10-18 RX ADMIN — Medication 650 MILLIGRAM(S): at 20:27

## 2024-10-18 RX ADMIN — PANTOPRAZOLE SODIUM 40 MILLIGRAM(S): 40 TABLET, DELAYED RELEASE ORAL at 08:38

## 2024-10-18 RX ADMIN — POLYETHYLENE GLYCOL 3350 17 GRAM(S): 17 POWDER, FOR SOLUTION ORAL at 17:21

## 2024-10-18 NOTE — H&P ADULT - ATTENDING COMMENTS
Pt has a pmhx of     fatigue for the past 2 weeks, was at cardiologist for new LE edema and at the time the hgb was 7.5, here it is 6.6. in the ED she was febrile to 101.9 and tachy to 108,   started on vanc and aztreonam due to penicillin allergy.   pt getting transfused for this as well    STOP ORDERING D DIMERS FOR INFLAMMATORY STATES - IT WILL BE ELEVATED    we will obtain hemolysis labs  retic count      Vitals: HD stable, O2 stable  Gen: appropriate affect, no acute distress  Neuro: no focal defects, no sensory deficits  HEENT: EOMI, no JVD, normocephalic, atraumatic, no scleral icterus  Cardio: RRR, S1 S2 present, no murmurs, rubs, or gallops  Resp: lungs b/l CTA, chest wall intact  Abd: soft, nondistended, nontender  MSK: no gross joint abnormalities, no obvious swelling  Skin: no rashes or wounds  Heme: no ecchymosis or petechiae present    pain control  DVT ppx  GI ppx  diet  Code status  DC planning     I personally reviewed labs and imaging and ordered necessary testing/medications  I discussed care of the patient with licensed providers  I personally reviewed chart and consultant recommendations  Pt has complex medical issues that require extensive diagnosis and intervention / threat to life  I personally spent X minutes in care of patient. Pt has a pmhx of non hodgkin's lymphoma, cardiac myxoma with resection in 2020 who presented for generalized weakness, lightheadedness, bilateral LE ankle swelling (isolate to ankles as  tels me at bedside), and syncope episode 3 months ago but worseing over last two weeks, also with new R sided vision blurriness, intermittent headaches. She went to cardiologist and her Hgb was 7.5 and she was tachycardic so went to ED for evaluation.     in the ED she was febrile to 101.9 and tachy to 108,   started on vanc and aztreonam due to penicillin allergy.     Vitals: HD stable, O2 stable  Gen: appropriate affect, no acute distress  Neuro: no focal defects, no sensory deficits  HEENT: EOMI, no JVD, normocephalic, atraumatic, no scleral icterus  Cardio: RRR, S1 S2 present, no murmurs, rubs, or gallops  Resp: lungs b/l CTA, chest wall intact  Abd: soft, nondistended, nontender  MSK: no gross joint abnormalities, no obvious swelling  Skin: no rashes or wounds  Heme: no ecchymosis or petechiae present    brain lesions, blurred vision  -CTH significant for multiple hypodense lesions in the brain, concerning for malignany or mets - MRI brain with and without IV contrast ordered    hepatic lesion and spleen lesion  -seen on CT abdomen and pelvis  -obtaining CTA PE study to rule out any blood clot as pt has some mild BL LE swelling, and tachycardia, fatigue, syncope and we will see if there are any lung lesions. Will give fluids overnight 10/18 as pt had CT scan with IV contrast on the 17th prior to obtaining new scan.   -suspcious for malignany   -GI consulted/hemeonc consulted     transient fever  -no source of infection, UA negative, CXR clear  -hold off abx at this time  -did receive vanc and aztreonam one time in the ER  -rvp negative  -fever may be in setting of cerebral lesions    LE swelling, hx of myxoma  -  -EKG with no acute ischemic changes  -CXR with no acute infiltrates   -echo 10/18:   1. Normal global left ventricular systolic function.   2. Left ventricular ejection fraction, by visual estimation, is 55 to   60%.   3. Normal right ventricular size and function.   4. Mild tricuspid regurgitation.   5. Normal pulmonary artery pressure.  -obtain LE doppler      anemia  -suspect blood loss anemia as bilirubin stable  -we will obtain hemolysis labs  -obtain vit b12, folate   -retic count    DVT ppx - holding due to anemia  DC planning - remain admitted, await MRI brain, CTA PE study, hemeonc consult, GI consult     I personally reviewed labs and imaging and ordered necessary testing/medications  I discussed care of the patient with licensed providers  I personally reviewed chart and consultant recommendations  Pt has complex medical issues that require extensive diagnosis and intervention / threat to life  I personally spent 75 minutes in care of patient.

## 2024-10-18 NOTE — PATIENT PROFILE ADULT - FALL HARM RISK - HARM RISK INTERVENTIONS
Assistance with ambulation/Assistance OOB with selected safe patient handling equipment/Communicate Risk of Fall with Harm to all staff/Monitor gait and stability/Reinforce activity limits and safety measures with patient and family/Sit up slowly, dangle for a short time, stand at bedside before walking/Tailored Fall Risk Interventions/Visual Cue: Yellow wristband and red socks/Bed in lowest position, wheels locked, appropriate side rails in place/Call bell, personal items and telephone in reach/Instruct patient to call for assistance before getting out of bed or chair/Non-slip footwear when patient is out of bed/[place] to call system/Physically safe environment - no spills, clutter or unnecessary equipment/Purposeful Proactive Rounding/Room/bathroom lighting operational, light cord in reach

## 2024-10-18 NOTE — H&P ADULT - NSICDXPASTMEDICALHX_GEN_ALL_CORE_FT
PAST MEDICAL HISTORY:  Cardiac myxoma     History of lymphoma     Hyperlipemia     Hypertension     Hypothyroidism

## 2024-10-18 NOTE — PATIENT PROFILE ADULT - FUNCTIONAL ASSESSMENT - BASIC MOBILITY 6.
3-calculated by average/Not able to assess (calculate score using [facility] averaging method)

## 2024-10-18 NOTE — PATIENT PROFILE ADULT - OVER THE PAST TWO WEEKS HAVE YOU FELT DOWN, DEPRESSED OR HOPELESS?
CC:  Nicolas Pearce is here today for   Chief Complaint   Patient presents with   • Establish Care     New patient visit for right wrist cyst.  DOI: May 2021 (he smashed is right wrist into a wood desk about 7 months ago).  WC: No.    • Office Visit     No pain to the right wrist at rest, but he reports discomfort when moving the wrist in certain positions.  No noticeable growth to the lump on the right wrist per patient.  He takes Ibuprofen 400 mg BID prn.    • Md Velarde     Mother is present.      Referring MD Self-referred.  PCP Mahesh Pope MD  Medications: medications verified and updated  Refills needed today?  NO  denies known Latex allergy or symptoms of Latex sensitivity.  Patient would like communication of their results via:    Cell Phone:   Telephone Information:   Mobile [phone]     Okay to leave a message containing results? Yes  Tobacco history: verified                 no

## 2024-10-18 NOTE — H&P ADULT - ASSESSMENT
Patient is a 63yro female with PMHx of HTN, HLD, hypothyroidism, hx of lymphoma (in remission), and cardiac myxoma with resection (approx 2020) presenting with generalized weakness, dizziness, lightheadedness, and bilateral LE swelling. Her latest hemoglobin was 6.6 on admission. Patient admitted for management of anemia.     #Acute on Chronic normocytic anemia   #Weakness   - Anemia initially started 2 years ago  - Hgb was 7.5 earlier today - on admission it was 6.6  - No hematochezia, melena, sources of bleeding  - SHAY -ve   -   - Iron studies, B 12, folate    #R sided vision changes  #Hx of lymphoma   - 30lbs weight loss in 2 years  - Was treated with chemotherapy for lymphoma 30 years ago  -     #B/L LE pitting edema  #Hx of cardiac myxoma  - Myxoma surgery about 4 years ago   - Follows with Dr. Portillo   - Reported last TTE normal  - TTE     Patient is a 63yro female with PMHx of HTN, HLD, hypothyroidism, hx of lymphoma (in remission), and cardiac myxoma with resection (approx 2020) presenting with generalized weakness, dizziness, lightheadedness, and bilateral LE swelling. Her latest hemoglobin was 6.6 on admission. Patient admitted for management of anemia.     Levo: 0.05mg qd  metoprolol succinate: 50 qd  omapra 40 qd  Vescpa 1mg x2 BID       #Acute on Chronic normocytic anemia   #Weakness   #R sided vision changes  #Hx of lymphoma   - Anemia initially started 2 years ago  - Follows with Dr. Ochoa as hematology/Oncology, reported that workup has been negative  - Hgb was 7.5 earlier today - on admission it was 6.6  - No hematochezia, melena, sources of bleeding  - SHAY -ve   - 30lbs weight loss in 2 years  - Was treated with chemotherapy for lymphoma 30 years ago  - F/u Iron studies, B 12, folate  - 2u PRBC given - f/u morning labs and CBC at 11AM  - F/u CTAP and CTH     #B/L LE pitting edema  #Hx of cardiac myxoma  - Myxoma surgery about 4 years ago   - Follows with Dr. Portillo   - Reported last TTE normal  - Trops 10,   - C/w metoprolol   - repeat TTE  - D-dimer   - lasix 40mg 1x dose after transfusion    #HTN  - BP on the softer side,  - Not taking any meds    #HLD  - Started Vascepa recently   - Not on any statins    #MISC  -DVT ppx: Lovenox  -GI ppx: Omeprazole   -Diet: Regular  -Activity: Bedrest    Pending  CTH and CTAP  Dimers  Iron studies, B12, folate   TTE Patient is a 63yro female with PMHx of HTN, HLD, hypothyroidism, hx of lymphoma (in remission), and cardiac myxoma with resection (approx 2020) presenting with generalized weakness, dizziness, lightheadedness, and bilateral LE swelling. Her latest hemoglobin was 6.6 on admission. Patient admitted for management of anemia.     #Acute on Chronic normocytic anemia   #Weakness   #R sided vision changes  #Hx of lymphoma   - Anemia initially started 2 years ago  - Follows with Dr. Ochoa as hematology/Oncology, reported that workup has been negative  - Hgb was 7.5 earlier today - on admission it was 6.6  - No hematochezia, melena, sources of bleeding  - SHAY -ve   - 30lbs weight loss in 2 years  - Was treated with chemotherapy for lymphoma 30 years ago  - F/u Iron studies, B 12, folate  - 2u PRBC given - f/u morning labs and CBC at 11AM  - F/u CTAP and CTH     #B/L LE pitting edema  #Hx of cardiac myxoma  - Myxoma surgery about 4 years ago   - Follows with Dr. Portillo   - Reported last TTE normal  - Trops 10,   - C/w metoprolol   - repeat TTE  - D-dimer   - lasix 40mg 1x dose after transfusion    #HTN  - BP on the softer side,  - Not taking any meds    #HLD  - Started Vascepa recently   - Not on any statins    #MISC  -DVT ppx: Lovenox  -GI ppx: Omeprazole   -Diet: Regular  -Activity: Bedrest    Pending  CTH and CTAP  Dimers  Iron studies, B12, folate   TTE

## 2024-10-18 NOTE — ED ADULT NURSE REASSESSMENT NOTE - NS ED NURSE REASSESS COMMENT FT1
pr resting in bed, family bedside, Transfusion in progress, pending transfer to ED3. ADM. safety/comfort maintained. Call bell within reach
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
## 2024-10-18 NOTE — H&P ADULT - HISTORY OF PRESENT ILLNESS
Patient is a 63yro female with PMHx of HTN, HLD, hypothyroidism, hx of lymphoma (in remission), and cardiac myxoma with resection (approx 2020) presenting with generalized weakness, dizziness, lightheadedness, and bilateral LE swelling. Patient also notes R sided vision blurriness. These symptoms have been ongoing for the past 2 years now, with episodes of syncope (last episode 3 mos ago), and the symptoms were worsening in the last two weeks. Today she went for a follow up with her cardiologist (Dr. Portillo), where it was found that her hgb was 7.5, so she was sent to the ED.   Of note, patient have been following with her hematologist/oncologist Dr. Ochoa for anemia workup in the past 2 years, including bone marrow biopsy, MRI, EGD/colonoscopy, and everything has been negative so far. Patient noted that  Patient is a 63yro female with PMHx of HTN, HLD, hypothyroidism, hx of lymphoma (in remission), and cardiac myxoma with resection (approx 2020) presenting with generalized weakness, dizziness, lightheadedness, and bilateral LE swelling. These symptoms have been ongoing for the past 2 years now, with episodes of syncope (last episode 3 mos ago), but the symptoms were worsening in the last two weeks along with a new R sided vision blurriness, intermittent headaches, and polydipsia. Today she went for a follow up with her cardiologist (Dr. Portillo), where it was found that her hgb was 7.5 and tachycardic, so she was sent to the ED. Patient denies any melena, hematochezia, N/V, or abdominal pain.   Of note, patient have been following with her hematologist/oncologist Dr. Ochoa for anemia workup in the past 2 years, including bone marrow biopsy, MRI, EGD/colonoscopy, and everything has been negative so far.  Patient denies any SOB, chest pain, bowel changes, cough, recent travel, recent illnesses.     In the ED, patient was initially febrile (Tmax 101.9), /65, , Sat 96%, RR 18  Patient was given 1x aztreonam and vancomycin, 1L LR, and tylenol, with the resolution of her fever.  Patient admitted for management of anemia.     Labs significant for:                 6.6    4.96  )-----------( 147      ( 10-17-24 @ 21:50 )              22.3     131  |  99  |  10  -------------------------<  115   10-17-24 @ 21:50  4.1  |  25  |  0.7    Ca      7.3     10-17-24 @ 21:50  Mg     1.9     10-17-24 @ 21:50    TPro  7.2  /  Alb  2.1  /  TBili  0.5  /  DBili  x   /  AST  29  /  ALT  <5  /  AlkPhos  112  /  GGT  x     10-17-24 @ 21:50    ProBNP 481  Trop 10  Lactate 2.5    UA -ve    RVP -ve     Imaging:      EKG:    Pt ordered for     Vitals  T(C): 36.9 (10-18-24 @ 00:15), Max: 38.8 (10-17-24 @ 22:08)  T(F): 98.5 (10-18-24 @ 00:15), Max: 101.9 (10-17-24 @ 22:08)  HR: 85 (10-18-24 @ 00:15) (85 - 113)  BP: 100/65 (10-18-24 @ 00:15) (100/65 - 116/68)  RR: 18 (10-18-24 @ 00:15) (18 - 20)  SpO2: 100% (10-18-24 @ 00:15) (95% - 100%)  Wt(kg): --    Review of Systems  CONSTITUTIONAL:  No weakness, fevers or chills  EYES/ENT:  No visual changes;  No vertigo or throat pain   NECK:  No pain or stiffness  RESPIRATORY:  No cough, wheezing, hemoptysis; No shortness of breath  CARDIOVASCULAR:  No chest pain or palpitations  GASTROINTESTINAL:  No abdominal or epigastric pain. No nausea, vomiting, or hematemesis; No diarrhea or constipation. No melena or hematochezia.  GENITOURINARY:  No dysuria, frequency or hematuria  MUSCULOSKELETAL: No muscle pains or joint pains  NEUROLOGICAL:  No numbness or weakness  SKIN:  No itching, rashes      Physical Exam  GENERAL: AAOx3. Well-nourished, laying in bed appearing in no acute distress  HEENT: No FNDs, atraumatic, normocephalic, moist mucus membranes  LUNGS: Clear to auscultation bilaterally  HEART: S1/S2. No heaves or thrills  ABD: Soft, non-tender, non-distended.  EXT/NEURO: Strength, sensation, ROM grossly intact  SKIN: No breaks, erythema, edema   Patient is a 63yro female with PMHx of HTN, HLD, hypothyroidism, hx of lymphoma (in remission), and cardiac myxoma with resection (approx 2020) presenting with generalized weakness, dizziness, lightheadedness, and bilateral LE swelling. These symptoms have been ongoing for the past 2 years now, with episodes of syncope (last episode 3 mos ago), but the symptoms were worsening in the last two weeks along with a new R sided vision blurriness, intermittent headaches, and polydipsia. Today she went for a follow up with her cardiologist (Dr. Portillo), where it was found that her hgb was 7.5 and tachycardic, so she was sent to the ED. Patient denies any melena, hematochezia, N/V, or abdominal pain.   Of note, patient have been following with her hematologist/oncologist Dr. Ochoa for anemia workup in the past 2 years, including bone marrow biopsy, MRI, EGD/colonoscopy, and everything has been negative so far.  Patient denies any SOB, chest pain, bowel changes, cough, recent travel, recent illnesses.     In the ED, patient was initially febrile (Tmax 101.9), /65, , Sat 96%, RR 18  Patient was given 1x aztreonam and vancomycin, 1L LR, and tylenol, with the resolution of her fever.  Patient admitted for management of anemia.     Labs significant for:                 6.6    4.96  )-----------( 147      ( 10-17-24 @ 21:50 )              22.3     131  |  99  |  10  -------------------------<  115   10-17-24 @ 21:50  4.1  |  25  |  0.7    Ca      7.3     10-17-24 @ 21:50  Mg     1.9     10-17-24 @ 21:50    TPro  7.2  /  Alb  2.1  /  TBili  0.5  /  DBili  x   /  AST  29  /  ALT  <5  /  AlkPhos  112  /  GGT  x     10-17-24 @ 21:50    ProBNP 481  Trop 10  Lactate 2.5    UA -ve    RVP -ve     Imaging:  CXR: clear on wet read    EKG: Sinus tachycardia    Vitals  T(C): 36.9 (10-18-24 @ 00:15), Max: 38.8 (10-17-24 @ 22:08)  T(F): 98.5 (10-18-24 @ 00:15), Max: 101.9 (10-17-24 @ 22:08)  HR: 85 (10-18-24 @ 00:15) (85 - 113)  BP: 100/65 (10-18-24 @ 00:15) (100/65 - 116/68)  RR: 18 (10-18-24 @ 00:15) (18 - 20)  SpO2: 100% (10-18-24 @ 00:15) (95% - 100%)  Wt(kg): --    Review of Systems  CONSTITUTIONAL:  Weakness, no fevers or chills  EYES/ENT:  R side blurry vision;  No vertigo or throat pain   NECK:  No pain or stiffness  RESPIRATORY:  No cough, wheezing, hemoptysis; No shortness of breath  CARDIOVASCULAR:  No chest pain or palpitations  GASTROINTESTINAL:  No abdominal or epigastric pain. No nausea, vomiting, or hematemesis; No diarrhea, + constipation. No melena or hematochezia.  GENITOURINARY:  No dysuria, frequency or hematuria  MUSCULOSKELETAL: No muscle pains or joint pains  NEUROLOGICAL:  No numbness, + weakness, lightheadedness, dizziness, headaches  SKIN:  No itching, rashes      Physical Exam  GENERAL: AAOx3. Well-nourished, laying in bed appearing pale and weak  HEENT: atraumatic, normocephalic, moist mucus membranes, pale mucus membranes   LUNGS: Clear to auscultation bilaterally  HEART: S1/S2. No heaves or thrills  ABD: Soft, non-tender, non-distended. tender in the paraumbilic region  EXT/NEURO: Strength, sensation, ROM grossly intact but weak, B/L 2+ pitting edema  SKIN: No breaks, erythema, edema

## 2024-10-19 LAB
ALBUMIN SERPL ELPH-MCNC: 2 G/DL — LOW (ref 3.5–5.2)
ALP SERPL-CCNC: 112 U/L — SIGNIFICANT CHANGE UP (ref 30–115)
ALT FLD-CCNC: <5 U/L — SIGNIFICANT CHANGE UP (ref 0–41)
ANION GAP SERPL CALC-SCNC: 9 MMOL/L — SIGNIFICANT CHANGE UP (ref 7–14)
AST SERPL-CCNC: 18 U/L — SIGNIFICANT CHANGE UP (ref 0–41)
BASOPHILS # BLD AUTO: 0.03 K/UL — SIGNIFICANT CHANGE UP (ref 0–0.2)
BASOPHILS NFR BLD AUTO: 0.5 % — SIGNIFICANT CHANGE UP (ref 0–1)
BILIRUB SERPL-MCNC: 0.8 MG/DL — SIGNIFICANT CHANGE UP (ref 0.2–1.2)
BUN SERPL-MCNC: 9 MG/DL — LOW (ref 10–20)
CALCIUM SERPL-MCNC: 7.4 MG/DL — LOW (ref 8.4–10.4)
CHLORIDE SERPL-SCNC: 98 MMOL/L — SIGNIFICANT CHANGE UP (ref 98–110)
CO2 SERPL-SCNC: 27 MMOL/L — SIGNIFICANT CHANGE UP (ref 17–32)
CREAT SERPL-MCNC: 0.5 MG/DL — LOW (ref 0.7–1.5)
EGFR: 105 ML/MIN/1.73M2 — SIGNIFICANT CHANGE UP
EOSINOPHIL # BLD AUTO: 0.01 K/UL — SIGNIFICANT CHANGE UP (ref 0–0.7)
EOSINOPHIL NFR BLD AUTO: 0.2 % — SIGNIFICANT CHANGE UP (ref 0–8)
GLUCOSE SERPL-MCNC: 83 MG/DL — SIGNIFICANT CHANGE UP (ref 70–99)
HAPTOGLOB SERPL-MCNC: 59 MG/DL — SIGNIFICANT CHANGE UP (ref 34–200)
HCT VFR BLD CALC: 29.7 % — LOW (ref 37–47)
HGB BLD-MCNC: 9.2 G/DL — LOW (ref 12–16)
IMM GRANULOCYTES NFR BLD AUTO: 9.7 % — HIGH (ref 0.1–0.3)
IRON SATN MFR SERPL: 16 % — SIGNIFICANT CHANGE UP (ref 15–50)
IRON SATN MFR SERPL: 19 UG/DL — LOW (ref 35–150)
LYMPHOCYTES # BLD AUTO: 1.12 K/UL — LOW (ref 1.2–3.4)
LYMPHOCYTES # BLD AUTO: 20.1 % — LOW (ref 20.5–51.1)
MAGNESIUM SERPL-MCNC: 1.9 MG/DL — SIGNIFICANT CHANGE UP (ref 1.8–2.4)
MCHC RBC-ENTMCNC: 25.9 PG — LOW (ref 27–31)
MCHC RBC-ENTMCNC: 31 G/DL — LOW (ref 32–37)
MCV RBC AUTO: 83.7 FL — SIGNIFICANT CHANGE UP (ref 81–99)
MONOCYTES # BLD AUTO: 0.98 K/UL — HIGH (ref 0.1–0.6)
MONOCYTES NFR BLD AUTO: 17.6 % — HIGH (ref 1.7–9.3)
NEUTROPHILS # BLD AUTO: 2.88 K/UL — SIGNIFICANT CHANGE UP (ref 1.4–6.5)
NEUTROPHILS NFR BLD AUTO: 51.9 % — SIGNIFICANT CHANGE UP (ref 42.2–75.2)
NRBC # BLD: 0 /100 WBCS — SIGNIFICANT CHANGE UP (ref 0–0)
PLATELET # BLD AUTO: 144 K/UL — SIGNIFICANT CHANGE UP (ref 130–400)
PMV BLD: 10.1 FL — SIGNIFICANT CHANGE UP (ref 7.4–10.4)
POTASSIUM SERPL-MCNC: 4.2 MMOL/L — SIGNIFICANT CHANGE UP (ref 3.5–5)
POTASSIUM SERPL-SCNC: 4.2 MMOL/L — SIGNIFICANT CHANGE UP (ref 3.5–5)
PROT SERPL-MCNC: 6.9 G/DL — SIGNIFICANT CHANGE UP (ref 6–8)
RBC # BLD: 3.55 M/UL — LOW (ref 4.2–5.4)
RBC # FLD: 17.6 % — HIGH (ref 11.5–14.5)
SODIUM SERPL-SCNC: 134 MMOL/L — LOW (ref 135–146)
TIBC SERPL-MCNC: 121 UG/DL — LOW (ref 220–430)
UIBC SERPL-MCNC: 102 UG/DL — LOW (ref 110–370)
WBC # BLD: 5.56 K/UL — SIGNIFICANT CHANGE UP (ref 4.8–10.8)
WBC # FLD AUTO: 5.56 K/UL — SIGNIFICANT CHANGE UP (ref 4.8–10.8)

## 2024-10-19 PROCEDURE — 70553 MRI BRAIN STEM W/O & W/DYE: CPT | Mod: 26

## 2024-10-19 PROCEDURE — 99232 SBSQ HOSP IP/OBS MODERATE 35: CPT

## 2024-10-19 PROCEDURE — 71275 CT ANGIOGRAPHY CHEST: CPT | Mod: 26

## 2024-10-19 RX ADMIN — Medication 2 TABLET(S): at 21:07

## 2024-10-19 RX ADMIN — Medication 650 MILLIGRAM(S): at 22:44

## 2024-10-19 RX ADMIN — Medication 50 MICROGRAM(S): at 06:08

## 2024-10-19 RX ADMIN — Medication 40 MILLIGRAM(S): at 06:08

## 2024-10-19 RX ADMIN — POLYETHYLENE GLYCOL 3350 17 GRAM(S): 17 POWDER, FOR SOLUTION ORAL at 06:08

## 2024-10-19 RX ADMIN — PANTOPRAZOLE SODIUM 40 MILLIGRAM(S): 40 TABLET, DELAYED RELEASE ORAL at 06:08

## 2024-10-19 RX ADMIN — Medication 650 MILLIGRAM(S): at 21:07

## 2024-10-19 RX ADMIN — CHLORHEXIDINE GLUCONATE 1 APPLICATION(S): 40 SOLUTION TOPICAL at 06:12

## 2024-10-19 NOTE — PROGRESS NOTE ADULT - ASSESSMENT
Patient is a 63yro female with PMHx of HTN, HLD, hypothyroidism, hx of lymphoma (in remission), and cardiac myxoma with resection (approx 2020) presenting with generalized weakness, dizziness, lightheadedness, and bilateral LE swelling. Her latest hemoglobin was 6.6 on admission. Patient admitted for management of anemia.     #Acute on Chronic normocytic anemia   #Weakness   #R sided vision changes  #Hx of lymphoma   - Anemia initially started 2 years ago  - Follows with Dr. Ochoa as hematology/Oncology, reported that workup has been negative  - Hgb was 7.5 earlier today - on admission it was 6.6  - No hematochezia, melena, sources of bleeding  - SHAY -ve   - 30lbs weight loss in 2 years  - Was treated with chemotherapy for lymphoma 30 years ago  - Total iron and TIBC low  - F/u B12, folate  - 2u PRBC given - Hb 6.6 ---> 9.2  - CTAP noted  - CTH noted - concerning for metastatic process, will obtain MRI brain  - TTE Noted  - f/u MRI brain     #B/L LE pitting edema  #Hx of cardiac myxoma  - Myxoma surgery about 4 years ago   - Follows with Dr. Portillo   - Reported last TTE normal  - Trops 10,   - C/w metoprolol   - lasix 40mg 1x dose after transfusion  - TTE noted      #HTN  - BP on the softer side,  - Not taking any meds    #HLD  - Started Vascepa recently   - Not on any statins    #MISC  -DVT ppx: Lovenox  -GI ppx: Omeprazole   -Diet: Regular  -Activity: Bedrest    Pending  f/u brain MRI

## 2024-10-19 NOTE — PROGRESS NOTE ADULT - SUBJECTIVE AND OBJECTIVE BOX
SUBJECTIVE/OVERNIGHT EVENTS  Today is hospital day 2d. This morning patient was seen and examined at bedside, resting comfortably in bed. No acute or major events overnight.    Admitting H&P  Patient is a 63yro female with PMHx of HTN, HLD, hypothyroidism, hx of lymphoma (in remission), and cardiac myxoma with resection (approx 2020) presenting with generalized weakness, dizziness, lightheadedness, and bilateral LE swelling. These symptoms have been ongoing for the past 2 years now, with episodes of syncope (last episode 3 mos ago), but the symptoms were worsening in the last two weeks along with a new R sided vision blurriness, intermittent headaches, and polydipsia. Today she went for a follow up with her cardiologist (Dr. Portillo), where it was found that her hgb was 7.5 and tachycardic, so she was sent to the ED. Patient denies any melena, hematochezia, N/V, or abdominal pain.   Of note, patient have been following with her hematologist/oncologist Dr. Ochoa for anemia workup in the past 2 years, including bone marrow biopsy, MRI, EGD/colonoscopy, and everything has been negative so far.  Patient denies any SOB, chest pain, bowel changes, cough, recent travel, recent illnesses.     In the ED, patient was initially febrile (Tmax 101.9), /65, , Sat 96%, RR 18  Patient was given 1x aztreonam and vancomycin, 1L LR, and tylenol, with the resolution of her fever.  Patient admitted for management of anemia.     Labs significant for:                 6.6    4.96  )-----------( 147      ( 10-17-24 @ 21:50 )              22.3     131  |  99  |  10  -------------------------<  115   10-17-24 @ 21:50  4.1  |  25  |  0.7    Ca      7.3     10-17-24 @ 21:50  Mg     1.9     10-17-24 @ 21:50    TPro  7.2  /  Alb  2.1  /  TBili  0.5  /  DBili  x   /  AST  29  /  ALT  <5  /  AlkPhos  112  /  GGT  x     10-17-24 @ 21:50    ProBNP 481  Trop 10  Lactate 2.5    UA -ve    RVP -ve     Imaging:  CXR: clear on wet read    EKG: Sinus tachycardia      MEDICATIONS  STANDING MEDICATIONS  chlorhexidine 2% Cloths 1 Application(s) Topical <User Schedule>  enoxaparin Injectable 40 milliGRAM(s) SubCutaneous every 24 hours  influenza   Vaccine 0.5 milliLiter(s) IntraMuscular once  levothyroxine 50 MICROGram(s) Oral daily  metoprolol succinate ER 50 milliGRAM(s) Oral daily  pantoprazole    Tablet 40 milliGRAM(s) Oral before breakfast  polyethylene glycol 3350 17 Gram(s) Oral every 12 hours  senna 2 Tablet(s) Oral at bedtime    PRN MEDICATIONS  acetaminophen     Tablet .. 650 milliGRAM(s) Oral every 6 hours PRN  melatonin 3 milliGRAM(s) Oral at bedtime PRN    VITALS  T(F): 98.5 (10-19-24 @ 05:00), Max: 99.7 (10-18-24 @ 20:20)  HR: 109 (10-19-24 @ 05:53) (65 - 122)  BP: 98/66 (10-19-24 @ 05:53) (94/63 - 111/77)  RR: 18 (10-19-24 @ 05:00) (18 - 18)  SpO2: 97% (10-19-24 @ 05:00) (96% - 98%)    PHYSICAL EXAM  GENERAL: NAD, lying in bed comfortably  HEAD:  Atraumatic, normocephalic  EYES: EOMI, PERRLA, conjunctiva and sclera clear  ENT: Moist mucous membranes  NECK: Supple, no JVD  HEART: Regular rate and rhythm, no murmurs, rubs, or gallops  LUNGS: Unlabored respirations.  Clear to auscultation bilaterally, no crackles, wheezing, or rhonchi  ABDOMEN: Soft, nontender, nondistended, +BS  EXTREMITIES: 2+ peripheral pulses bilaterally. No clubbing, cyanosis, or edema  NERVOUS SYSTEM:  A&Ox3, no focal deficits, b/l LE strength 4/5   SKIN: No rashes or lesions    LABS             9.2    5.56  )-----------( 144      ( 10-19-24 @ 07:30 )             29.7     134  |  98  |  9   -------------------------<  83   10-19-24 @ 07:30  4.2  |  27  |  0.5    Ca      7.4     10-19-24 @ 07:30  Mg     1.9     10-19-24 @ 07:30    TPro  6.9  /  Alb  2.0  /  TBili  0.8  /  DBili  x   /  AST  18  /  ALT  <5  /  AlkPhos  112  /  GGT  x     10-19-24 @ 07:30      Troponin T, High Sensitivity Result: 10 ng/L (10-17-24 @ 21:50)  Pro-Brain Natriuretic Peptide: 481 pg/mL (10-17-24 @ 21:50)    Urinalysis Basic - ( 19 Oct 2024 07:30 )    Color: x / Appearance: x / SG: x / pH: x  Gluc: 83 mg/dL / Ketone: x  / Bili: x / Urobili: x   Blood: x / Protein: x / Nitrite: x   Leuk Esterase: x / RBC: x / WBC x   Sq Epi: x / Non Sq Epi: x / Bacteria: x          Urinalysis with Rflx Culture (collected 18 Oct 2024 00:03)    Culture - Blood (collected 17 Oct 2024 22:08)  Source: .Blood BLOOD  Preliminary Report (19 Oct 2024 09:01):    No growth at 24 hours    Culture - Blood (collected 17 Oct 2024 22:08)  Source: .Blood BLOOD  Preliminary Report (19 Oct 2024 09:01):    No growth at 24 hours      IMAGING    CTPE - IMPRESSION:  No pulmonary emboli. No evidence of acute thoracic pathology.  Right lower lobe nodular density measuring approximately 8 x 6 mm is   unchanged image 111. Short-term follow-up CT in 3 months is recommended.    CTH - Focal hyperdensity is noted within the right basal ganglia which may   reflect mineralization, acute hemorrhage or potentially neoplastic lesion.    Scattered hypodensities are noted within the splenium of the corpus   callosum, right inferior cerebellar hemisphere as well as the bilateral   temporal periventricular white matter which may reflect underlying   metastatic lesions or potentially ischemia. Further evaluation with   contrast enhanced brain MRI is recommended.    TTE - Summary:   1. Normal global left ventricular systolic function.   2. Left ventricular ejection fraction, by visual estimation, is 55 to   60%.   3. Normal right ventricular size and function.   4. Mild tricuspid regurgitation.   5. Normal pulmonary artery pressure.     SUBJECTIVE/OVERNIGHT EVENTS  Today is hospital day 2d. This morning patient was seen and examined at bedside, resting comfortably in bed. No acute or major events overnight.    Admitting H&P  Patient is a 63yro female with PMHx of HTN, HLD, hypothyroidism, hx of lymphoma (in remission), and cardiac myxoma with resection (approx 2020) presenting with generalized weakness, dizziness, lightheadedness, and bilateral LE swelling. These symptoms have been ongoing for the past 2 years now, with episodes of syncope (last episode 3 mos ago), but the symptoms were worsening in the last two weeks along with a new R sided vision blurriness, intermittent headaches, and polydipsia. Today she went for a follow up with her cardiologist (Dr. Portillo), where it was found that her hgb was 7.5 and tachycardic, so she was sent to the ED. Patient denies any melena, hematochezia, N/V, or abdominal pain.   Of note, patient have been following with her hematologist/oncologist Dr. Ochoa for anemia workup in the past 2 years, including bone marrow biopsy, MRI, EGD/colonoscopy, and everything has been negative so far.  Patient denies any SOB, chest pain, bowel changes, cough, recent travel, recent illnesses.     In the ED, patient was initially febrile (Tmax 101.9), /65, , Sat 96%, RR 18  Patient was given 1x aztreonam and vancomycin, 1L LR, and tylenol, with the resolution of her fever.  Patient admitted for management of anemia.     Labs significant for:                 6.6    4.96  )-----------( 147      ( 10-17-24 @ 21:50 )              22.3     131  |  99  |  10  -------------------------<  115   10-17-24 @ 21:50  4.1  |  25  |  0.7    Ca      7.3     10-17-24 @ 21:50  Mg     1.9     10-17-24 @ 21:50    TPro  7.2  /  Alb  2.1  /  TBili  0.5  /  DBili  x   /  AST  29  /  ALT  <5  /  AlkPhos  112  /  GGT  x     10-17-24 @ 21:50    ProBNP 481  Trop 10  Lactate 2.5    UA -ve    RVP -ve     Imaging:  CXR: clear on wet read    EKG: Sinus tachycardia      MEDICATIONS  STANDING MEDICATIONS  chlorhexidine 2% Cloths 1 Application(s) Topical <User Schedule>  enoxaparin Injectable 40 milliGRAM(s) SubCutaneous every 24 hours  influenza   Vaccine 0.5 milliLiter(s) IntraMuscular once  levothyroxine 50 MICROGram(s) Oral daily  metoprolol succinate ER 50 milliGRAM(s) Oral daily  pantoprazole    Tablet 40 milliGRAM(s) Oral before breakfast  polyethylene glycol 3350 17 Gram(s) Oral every 12 hours  senna 2 Tablet(s) Oral at bedtime    PRN MEDICATIONS  acetaminophen     Tablet .. 650 milliGRAM(s) Oral every 6 hours PRN  melatonin 3 milliGRAM(s) Oral at bedtime PRN    VITALS  T(F): 98.5 (10-19-24 @ 05:00), Max: 99.7 (10-18-24 @ 20:20)  HR: 109 (10-19-24 @ 05:53) (65 - 122)  BP: 98/66 (10-19-24 @ 05:53) (94/63 - 111/77)  RR: 18 (10-19-24 @ 05:00) (18 - 18)  SpO2: 97% (10-19-24 @ 05:00) (96% - 98%)    PHYSICAL EXAM  GENERAL: NAD, lying in bed comfortably  HEAD:  Atraumatic, normocephalic  EYES: EOMI, PERRLA, conjunctiva and sclera clear  ENT: Moist mucous membranes  NECK: Supple, no JVD  HEART: Regular rate and rhythm, no murmurs, rubs, or gallops  LUNGS: Unlabored respirations.  Clear to auscultation bilaterally, no crackles, wheezing, or rhonchi  ABDOMEN: Soft, nontender, nondistended, +BS  EXTREMITIES: 2+ peripheral pulses bilaterally. No clubbing, cyanosis, or edema  NERVOUS SYSTEM:  A&Ox3, no focal deficits, b/l LE strength 5/5   SKIN: No rashes or lesions    LABS             9.2    5.56  )-----------( 144      ( 10-19-24 @ 07:30 )             29.7     134  |  98  |  9   -------------------------<  83   10-19-24 @ 07:30  4.2  |  27  |  0.5    Ca      7.4     10-19-24 @ 07:30  Mg     1.9     10-19-24 @ 07:30    TPro  6.9  /  Alb  2.0  /  TBili  0.8  /  DBili  x   /  AST  18  /  ALT  <5  /  AlkPhos  112  /  GGT  x     10-19-24 @ 07:30      Troponin T, High Sensitivity Result: 10 ng/L (10-17-24 @ 21:50)  Pro-Brain Natriuretic Peptide: 481 pg/mL (10-17-24 @ 21:50)    Urinalysis Basic - ( 19 Oct 2024 07:30 )    Color: x / Appearance: x / SG: x / pH: x  Gluc: 83 mg/dL / Ketone: x  / Bili: x / Urobili: x   Blood: x / Protein: x / Nitrite: x   Leuk Esterase: x / RBC: x / WBC x   Sq Epi: x / Non Sq Epi: x / Bacteria: x          Urinalysis with Rflx Culture (collected 18 Oct 2024 00:03)    Culture - Blood (collected 17 Oct 2024 22:08)  Source: .Blood BLOOD  Preliminary Report (19 Oct 2024 09:01):    No growth at 24 hours    Culture - Blood (collected 17 Oct 2024 22:08)  Source: .Blood BLOOD  Preliminary Report (19 Oct 2024 09:01):    No growth at 24 hours      IMAGING    CTPE - IMPRESSION:  No pulmonary emboli. No evidence of acute thoracic pathology.  Right lower lobe nodular density measuring approximately 8 x 6 mm is   unchanged image 111. Short-term follow-up CT in 3 months is recommended.    CTH - Focal hyperdensity is noted within the right basal ganglia which may   reflect mineralization, acute hemorrhage or potentially neoplastic lesion.    Scattered hypodensities are noted within the splenium of the corpus   callosum, right inferior cerebellar hemisphere as well as the bilateral   temporal periventricular white matter which may reflect underlying   metastatic lesions or potentially ischemia. Further evaluation with   contrast enhanced brain MRI is recommended.    TTE - Summary:   1. Normal global left ventricular systolic function.   2. Left ventricular ejection fraction, by visual estimation, is 55 to   60%.   3. Normal right ventricular size and function.   4. Mild tricuspid regurgitation.   5. Normal pulmonary artery pressure.

## 2024-10-19 NOTE — CONSULT NOTE ADULT - ASSESSMENT
This is a 63yro female with PMHx of HTN, HLD, hypothyroidism, hx of lymphoma (in remission), and cardiac myxoma with resection (approx 2020) who presents for anemia, generalized weakness, unilateral R sided vision loss. MRI w/ ?subacute infarct R cerebellum w/ petechial hemorrhage Multiple nonenhancing lesions bilateral periventricular white matters, splenium of the corpus callosum, and right sabra w/ restricted diffusion in associated with the corpus callosal and right pontine lesions. Etiology of lesions unclear. Leading consideration for demyelinating/autoimmune vs paraneoplastic etiology. Would also consider, less likely vascular or neoplastic.     Recommendations:  Obtain LP including  This is a 63yro female with PMHx of HTN, HLD, hypothyroidism, hx of lymphoma (in remission), and cardiac myxoma with resection (approx 2020) who presents for anemia, generalized weakness, unilateral R sided vision loss. MRI w/ ?subacute infarct R cerebellum w/ petechial hemorrhage Multiple nonenhancing lesions bilateral periventricular white matters, splenium of the corpus callosum, and right sabra w/ restricted diffusion in associated with the corpus callosal and right pontine lesions. Etiology of lesions unclear. Leading consideration for demyelinating/autoimmune vs paraneoplastic etiology. Would also consider, less likely vascular or neoplastic.     Recommendations:  - Obtain LP including Cell count, glucose, protein, LDH, Acid fast culture, fungal cultures, Crypto, EBV, Gram stain, CSF PCR, HSV, Histo, ROBI, Lyme, Borrelia, VDRL, West Nile, ACE (CSF and serum), IgG index, Autoimmune encephelopathy panel (serum and CSF), MOG, Myelin basic protein, Oligoclonal bands, Flow Cytometry, Cytopathology, Parnaeoplastic (CSF and serum).  - Send labs including:   - ESR, CRP, CARLA, Anti ds-DNA, Complement levels C3/C4, RF, ANCA, Sjogrens (SS-A, SS-B), ACE, TSH/T4, SPEP, Lyme/Borrelia screen, Syphilis,   - Anti-smith Ab, Anti-histone Antibodies, Anti-RNP Antibodies, Cryoglobulins   - HIV, EBV PCR, CMV PCR, RPR, Hepatitis panel,     - Ophtho eval  - CT C/A/P w/ and w/o - less likely metastatic but consider paraneoplastic  - MRI C and T spine w/ and w/o to ensure no other lesiosn  - TTE w/ bubble  - ASA 81mg po qd (if cleared from anemia standpoint), and Atorvastatin 80mg po qd

## 2024-10-19 NOTE — CONSULT NOTE ADULT - SUBJECTIVE AND OBJECTIVE BOX
NEUROLOGY CONSULT    HPI:  This is a 63yro female with PMHx of HTN, HLD, hypothyroidism, hx of lymphoma (in remission), and cardiac myxoma with resection (approx 2020) who presents for anemia, found to have Hb 7.5 as an outpatient.     She denies any complaints at present except for RLE swelling which has since resolved.  Per medicine note has been having generalized weakness, dizziness, lightheadedness, as well. She admits weakness to me but denies any dizziness. On asking initially denies any visual changes, but after further prompting notes that the unilateral R sided vision loss.     Of note, patient have been following with her hematologist/oncologist Dr. Ochoa for anemia workup in the past 2 years, including bone marrow biopsy, MRI, EGD/colonoscopy, and everything has been negative so far.    She was found to be febrile in the ER. She says she was having sweats for the past few nights. Reports that 2 years ago lost 30lbs but the past year weight has been stable. No known exposures, trauma, travel, denies toxic habits, currently retired. Family history of DM, but otherwise parents are healthy.     Initialy CTH w/ Focal hyperdensity is noted within the right basal ganglia and scattered hypodensities are noted within the splenium of the corpus callosum, right inferior cerebellar hemisphere as well as the bilateral temporal periventricular white matter.    MRI w/ and w/o was ordered which showed:  Patchy lesion in the inferior right cerebellum with petechial hemorrhage. and Multiple additional nonenhancing lesions throughout the bilateral periventricular white matters, splenium of the corpus callosum, and right sabra. Neurology consulted.    Neurologic:  -Mental status: Awake, alert, oriented to person, place, and time. Speech is fluent with intact naming, repetition, and comprehension, no dysarthria. Recent and remote memory intact. Follows simple and complex cross commands. Cannot spell WORLD backwards. Cannot do serial 7's.   -Cranial nerves:   II: Visual fields are full to confrontation. Visual acuities (with correction) OD 20/100. OS 20/30.  III, IV, VI: Extraocular movements are intact without nystagmus. Pupils equally round and reactive to light. ?R APD, Red desat normal.  V:  Facial sensation V1-V3 equal and intact   VII: Face is symmetric with normal eye closure and smile  VIII: Hearing is bilaterally intact to finger rub  IX, X: Uvula is midline and soft palate rises symmetrically  XI: Head turning and shoulder shrug are intact.  XII: Tongue protrudes midline  Motor: Normal bulk and tone. No pronator drift. Strength bilateral upper extremity 5/5, 4+ L hip flexion, otherwise 5/5 b/l LE.   Sensation: Intact to light touch, PP, Temperature, ABHINAV, and vibration b/l.   Coordination: No dysmetria on finger-to-nose bilaterally  Reflexes: Mute toes bilaterally. Reflexes 1+ b/l achilles. 2+ L patellar, BR, BI. 1+ R patellar BR BI.  Gait: deferred    LABS:                        9.2    5.56  )-----------( 144      ( 19 Oct 2024 07:30 )             29.7     10-19    134[L]  |  98  |  9[L]  ----------------------------<  83  4.2   |  27  |  0.5[L]    Ca    7.4[L]      19 Oct 2024 07:30  Mg     1.9     10-19    TPro  6.9  /  Alb  2.0[L]  /  TBili  0.8  /  DBili  x   /  AST  18  /  ALT  <5  /  AlkPhos  112  10-19    Hemoglobin A1C:   Vitamin B12     CAPILLARY BLOOD GLUCOSE          Urinalysis Basic - ( 19 Oct 2024 07:30 )    Color: x / Appearance: x / SG: x / pH: x  Gluc: 83 mg/dL / Ketone: x  / Bili: x / Urobili: x   Blood: x / Protein: x / Nitrite: x   Leuk Esterase: x / RBC: x / WBC x   Sq Epi: x / Non Sq Epi: x / Bacteria: x            Microbiology:    Urinalysis with Rflx Culture (collected 18 Oct 2024 00:03)    Culture - Blood (collected 17 Oct 2024 22:08)  Source: .Blood BLOOD  Preliminary Report (19 Oct 2024 09:01):    No growth at 24 hours    Culture - Blood (collected 17 Oct 2024 22:08)  Source: .Blood BLOOD  Preliminary Report (19 Oct 2024 09:01):    No growth at 24 hours        RADIOLOGY, EKG AND ADDITIONAL TESTS: Reviewed.

## 2024-10-19 NOTE — CONSULT NOTE ADULT - ASSESSMENT
63yro female with PMHx of HTN, HLD, hypothyroidism, hx of lymphoma (in remission), and cardiac myxoma with resection (approx 2020) presenting with anemia.   Of note, patient have been following with her hematologist/oncologist Dr. Ochoa for anemia workup in the past 2 years, including bone marrow biopsy, MRI, EGD/colonoscopy. GI was consulted anemia and liver lesions.       Anemia   weight loss   liver and brain lesions r/o metastatic disease   Recent MRI   Recent EGD/Colonoscopy Dr Rouse     PLAN  Obtain records from primary gastroenterologist  Monitor hemoglobin   Oncology consult Dr Ochoa        63yro female with PMHx of HTN, HLD, hypothyroidism, hx of lymphoma (in remission), and cardiac myxoma with resection (approx 2020) presenting with anemia.   Of note, patient have been following with her hematologist/oncologist Dr. Ochoa for anemia workup in the past 2 years, including bone marrow biopsy, MRI, EGD/colonoscopy. GI was consulted anemia and liver lesions.       Anemia   weight loss   liver and brain lesions r/o metastatic disease   Recent MRI   Recent EGD/Colonoscopy Dr Rouse     PLAN  Obtain records from primary gastroenterologist  Monitor hemoglobin   anemia workup   Oncology consult Dr Ochoa

## 2024-10-19 NOTE — CONSULT NOTE ADULT - ATTENDING COMMENTS
Patient was seen at bedside.   Agree with exam as noted above.   Images have been reviewed.   Patient has a broad differential   agree with plan as noted above.

## 2024-10-19 NOTE — CONSULT NOTE ADULT - SUBJECTIVE AND OBJECTIVE BOX
Gastroenterology Consultation:    Patient is a 63y old  Female who presents with a chief complaint of Anemia (19 Oct 2024 11:21)        Admitted on: 10-17-24      HPI:  Patient is a 63yro female with PMHx of HTN, HLD, hypothyroidism, hx of lymphoma (in remission), and cardiac myxoma with resection (approx 2020) presenting with generalized weakness, dizziness, lightheadedness, and bilateral LE swelling. These symptoms have been ongoing for the past 2 years now, with episodes of syncope (last episode 3 mos ago), but the symptoms were worsening in the last two weeks along with a new R sided vision blurriness, intermittent headaches, and polydipsia. Today she went for a follow up with her cardiologist (Dr. Portillo), where it was found that her hgb was 7.5 and tachycardic, so she was sent to the ED. Patient denies any melena, hematochezia, N/V, or abdominal pain.   Of note, patient have been following with her hematologist/oncologist Dr. Ochoa for anemia workup in the past 2 years, including bone marrow biopsy, MRI, EGD/colonoscopy, and everything has been negative so far.  GI was consulted anemia and liver lesions     Prior EGD/ Colonoscopy:  recent with dr armstrong     PAST MEDICAL & SURGICAL HISTORY:  Hypertension      Hyperlipemia      Hypothyroidism      History of lymphoma      Cardiac myxoma            FAMILY HISTORY:  no fhx of gi cancers     Social History:  no substance use     Home Medications:  aspirin 81 mg oral tablet: orally once a day (18 Oct 2024 04:33)  levothyroxine 50 mcg (0.05 mg) oral tablet: 1 tab(s) orally once a day (18 Oct 2024 04:31)  metoprolol succinate 50 mg oral tablet, extended release: 1 tab(s) orally once a day (18 Oct 2024 04:30)  omeprazole 40 mg oral delayed release capsule: 1 cap(s) orally once a day (18 Oct 2024 04:32)  Vascepa 1 g oral capsule: 2 cap(s) orally 2 times a day (18 Oct 2024 04:32)        MEDICATIONS  (STANDING):  chlorhexidine 2% Cloths 1 Application(s) Topical <User Schedule>  enoxaparin Injectable 40 milliGRAM(s) SubCutaneous every 24 hours  influenza   Vaccine 0.5 milliLiter(s) IntraMuscular once  levothyroxine 50 MICROGram(s) Oral daily  metoprolol succinate ER 50 milliGRAM(s) Oral daily  pantoprazole    Tablet 40 milliGRAM(s) Oral before breakfast  polyethylene glycol 3350 17 Gram(s) Oral every 12 hours  senna 2 Tablet(s) Oral at bedtime    MEDICATIONS  (PRN):  acetaminophen     Tablet .. 650 milliGRAM(s) Oral every 6 hours PRN Temp greater or equal to 38C (100.4F), Mild Pain (1 - 3)  melatonin 3 milliGRAM(s) Oral at bedtime PRN Insomnia      Allergies  penicillin (Unknown)      Review of Systems:   Constitutional:  No Fever, No Chills  ENT/Mouth:  No Hearing Changes,  No Difficulty Swallowing  Eyes:  No Eye Pain, No Vision Changes  Cardiovascular:  No Chest Pain, No Palpitations  Respiratory:  No Cough, No Dyspnea  Gastrointestinal:  As described in HPI  Musculoskeletal:  No Joint Swelling, No Back Pain  Skin:  No Skin Lesions, No Jaundice  Neuro:  No Syncope, No Dizziness  Heme/Lymph:  No Bruising, No Bleeding.          Physical Examination:  T(C): 36.6 (10-19-24 @ 11:35), Max: 37.6 (10-18-24 @ 20:20)  HR: 108 (10-19-24 @ 11:35) (65 - 119)  BP: 98/66 (10-19-24 @ 11:35) (94/63 - 111/77)  RR: 18 (10-19-24 @ 11:35) (18 - 18)  SpO2: 98% (10-19-24 @ 11:35) (96% - 98%)          GENERAL: AAOx3, no acute distress.  HEAD:  Atraumatic, Normocephalic  EYES: conjunctiva and sclera clear  NECK: Supple, no JVD or thyromegaly  CHEST/LUNG: Clear to auscultation bilaterally; No wheeze, rhonchi, or rales  HEART: Regular rate and rhythm; normal S1, S2, No murmurs.  ABDOMEN: Soft, nontender, nondistended; Bowel sounds present  NEUROLOGY: No asterixis or tremor.   SKIN: Intact, no jaundice        Data:                        9.2    5.56  )-----------( 144      ( 19 Oct 2024 07:30 )             29.7     Hgb Trend:  9.2  10-19-24 @ 07:30  9.2  10-18-24 @ 21:00  9.9  10-18-24 @ 16:48  9.5  10-18-24 @ 11:21  6.6  10-17-24 @ 21:50      10-19    134[L]  |  98  |  9[L]  ----------------------------<  83  4.2   |  27  |  0.5[L]    Ca    7.4[L]      19 Oct 2024 07:30  Mg     1.9     10-19    TPro  6.9  /  Alb  2.0[L]  /  TBili  0.8  /  DBili  x   /  AST  18  /  ALT  <5  /  AlkPhos  112  10-19    Liver panel trend:  TBili 0.8   /   AST 18   /   ALT <5   /   AlkP 112   /   Tptn 6.9   /   Alb 2.0    /   DBili --      10-19  TBili 0.7   /   AST 22   /   ALT <5   /   AlkP 126   /   Tptn 7.1   /   Alb 2.2    /   DBili --      10-18  TBili 0.9   /   AST 25   /   ALT 5   /   AlkP 127   /   Tptn 7.4   /   Alb 2.1    /   DBili --      10-18  TBili 0.5   /   AST 29   /   ALT <5   /   AlkP 112   /   Tptn 7.2   /   Alb 2.1    /   DBili --      10-17          Urinalysis with Rflx Culture (collected 18 Oct 2024 00:03)    Culture - Blood (collected 17 Oct 2024 22:08)  Source: .Blood BLOOD  Preliminary Report (19 Oct 2024 09:01):    No growth at 24 hours    Culture - Blood (collected 17 Oct 2024 22:08)  Source: .Blood BLOOD  Preliminary Report (19 Oct 2024 09:01):    No growth at 24 hours          Radiology:

## 2024-10-20 LAB
ALBUMIN SERPL ELPH-MCNC: 2 G/DL — LOW (ref 3.5–5.2)
ALP SERPL-CCNC: 118 U/L — HIGH (ref 30–115)
ALT FLD-CCNC: <5 U/L — SIGNIFICANT CHANGE UP (ref 0–41)
ANION GAP SERPL CALC-SCNC: 8 MMOL/L — SIGNIFICANT CHANGE UP (ref 7–14)
AST SERPL-CCNC: 17 U/L — SIGNIFICANT CHANGE UP (ref 0–41)
BASOPHILS # BLD AUTO: 0.01 K/UL — SIGNIFICANT CHANGE UP (ref 0–0.2)
BASOPHILS NFR BLD AUTO: 0.2 % — SIGNIFICANT CHANGE UP (ref 0–1)
BILIRUB SERPL-MCNC: 0.8 MG/DL — SIGNIFICANT CHANGE UP (ref 0.2–1.2)
BUN SERPL-MCNC: 14 MG/DL — SIGNIFICANT CHANGE UP (ref 10–20)
CALCIUM SERPL-MCNC: 7.5 MG/DL — LOW (ref 8.4–10.5)
CHLORIDE SERPL-SCNC: 101 MMOL/L — SIGNIFICANT CHANGE UP (ref 98–110)
CO2 SERPL-SCNC: 26 MMOL/L — SIGNIFICANT CHANGE UP (ref 17–32)
CREAT SERPL-MCNC: 0.6 MG/DL — LOW (ref 0.7–1.5)
EGFR: 100 ML/MIN/1.73M2 — SIGNIFICANT CHANGE UP
EOSINOPHIL # BLD AUTO: 0.01 K/UL — SIGNIFICANT CHANGE UP (ref 0–0.7)
EOSINOPHIL NFR BLD AUTO: 0.2 % — SIGNIFICANT CHANGE UP (ref 0–8)
FERRITIN SERPL-MCNC: 2642 NG/ML — HIGH (ref 13–330)
FOLATE SERPL-MCNC: 3.5 NG/ML — LOW
GLUCOSE SERPL-MCNC: 96 MG/DL — SIGNIFICANT CHANGE UP (ref 70–99)
HCT VFR BLD CALC: 28.9 % — LOW (ref 37–47)
HGB BLD-MCNC: 8.9 G/DL — LOW (ref 12–16)
IMM GRANULOCYTES NFR BLD AUTO: 0.6 % — HIGH (ref 0.1–0.3)
LYMPHOCYTES # BLD AUTO: 0.92 K/UL — LOW (ref 1.2–3.4)
LYMPHOCYTES # BLD AUTO: 19.9 % — LOW (ref 20.5–51.1)
MAGNESIUM SERPL-MCNC: 1.9 MG/DL — SIGNIFICANT CHANGE UP (ref 1.8–2.4)
MCHC RBC-ENTMCNC: 26 PG — LOW (ref 27–31)
MCHC RBC-ENTMCNC: 30.8 G/DL — LOW (ref 32–37)
MCV RBC AUTO: 84.5 FL — SIGNIFICANT CHANGE UP (ref 81–99)
MONOCYTES # BLD AUTO: 1.02 K/UL — HIGH (ref 0.1–0.6)
MONOCYTES NFR BLD AUTO: 22.1 % — HIGH (ref 1.7–9.3)
NEUTROPHILS # BLD AUTO: 2.63 K/UL — SIGNIFICANT CHANGE UP (ref 1.4–6.5)
NEUTROPHILS NFR BLD AUTO: 57 % — SIGNIFICANT CHANGE UP (ref 42.2–75.2)
NRBC # BLD: 0 /100 WBCS — SIGNIFICANT CHANGE UP (ref 0–0)
PLATELET # BLD AUTO: 120 K/UL — LOW (ref 130–400)
PMV BLD: 10.2 FL — SIGNIFICANT CHANGE UP (ref 7.4–10.4)
POTASSIUM SERPL-MCNC: 4.1 MMOL/L — SIGNIFICANT CHANGE UP (ref 3.5–5)
POTASSIUM SERPL-SCNC: 4.1 MMOL/L — SIGNIFICANT CHANGE UP (ref 3.5–5)
PROT SERPL-MCNC: 6.9 G/DL — SIGNIFICANT CHANGE UP (ref 6–8)
RBC # BLD: 3.42 M/UL — LOW (ref 4.2–5.4)
RBC # FLD: 18.1 % — HIGH (ref 11.5–14.5)
SODIUM SERPL-SCNC: 135 MMOL/L — SIGNIFICANT CHANGE UP (ref 135–146)
VIT B12 SERPL-MCNC: 436 PG/ML — SIGNIFICANT CHANGE UP (ref 232–1245)
WBC # BLD: 4.62 K/UL — LOW (ref 4.8–10.8)
WBC # FLD AUTO: 4.62 K/UL — LOW (ref 4.8–10.8)

## 2024-10-20 PROCEDURE — 99233 SBSQ HOSP IP/OBS HIGH 50: CPT

## 2024-10-20 RX ORDER — IRON SUCROSE 20 MG/ML
200 INJECTION, SOLUTION INTRAVENOUS
Refills: 0 | Status: ACTIVE | OUTPATIENT
Start: 2024-10-20 | End: 2024-10-25

## 2024-10-20 RX ORDER — ENOXAPARIN SODIUM 40MG/0.4ML
30 SYRINGE (ML) SUBCUTANEOUS EVERY 24 HOURS
Refills: 0 | Status: DISCONTINUED | OUTPATIENT
Start: 2024-10-20 | End: 2024-10-28

## 2024-10-20 RX ORDER — FOLIC ACID 1 MG/1
5 TABLET ORAL DAILY
Refills: 0 | Status: DISCONTINUED | OUTPATIENT
Start: 2024-10-20 | End: 2024-10-28

## 2024-10-20 RX ADMIN — CHLORHEXIDINE GLUCONATE 1 APPLICATION(S): 40 SOLUTION TOPICAL at 06:04

## 2024-10-20 RX ADMIN — Medication 50 MILLIGRAM(S): at 06:02

## 2024-10-20 RX ADMIN — Medication 40 MILLIGRAM(S): at 06:01

## 2024-10-20 RX ADMIN — Medication 650 MILLIGRAM(S): at 22:00

## 2024-10-20 RX ADMIN — Medication 50 MICROGRAM(S): at 06:01

## 2024-10-20 RX ADMIN — Medication 650 MILLIGRAM(S): at 21:46

## 2024-10-20 RX ADMIN — FOLIC ACID 5 MILLIGRAM(S): 1 TABLET ORAL at 15:03

## 2024-10-20 RX ADMIN — PANTOPRAZOLE SODIUM 40 MILLIGRAM(S): 40 TABLET, DELAYED RELEASE ORAL at 06:01

## 2024-10-20 RX ADMIN — IRON SUCROSE 100 MILLIGRAM(S): 20 INJECTION, SOLUTION INTRAVENOUS at 15:02

## 2024-10-20 RX ADMIN — POLYETHYLENE GLYCOL 3350 17 GRAM(S): 17 POWDER, FOR SOLUTION ORAL at 06:00

## 2024-10-20 NOTE — PROGRESS NOTE ADULT - ASSESSMENT
63yro female with PMHx of HTN, HLD, hypothyroidism, hx of lymphoma (in remission), and cardiac myxoma with resection (approx 2020) presenting with fever, generalized weakness, dizziness, lightheadedness,    # Acute on Chronic normocytic anemia, symptomatic   # fever, Weakness, R sided vision changes  # brain MRI w nonenhancing lesions,   # r/u CNS disease, demyelinating dz, paraneoplastic VS others   # Hx of non hodgkin's lymphoma  # Hx of cardiac myxoma s/p resection   # HTN/ HLD      PLANs:    - MRI Patchy lesion in the inferior right cerebellum with petechial hemorrhage may represent subacute infarct.  - Multiple additional nonenhancing lesions throughout the bilateral periventricular white matters, splenium of the corpus callosum, and right urmila  - R/U demyelinating/autoimmune vs paraneoplastic   - needs LP  - CT abd:  Several hepatic hypodensities 0.7 cm, splenic hypodensity lesion measuring 2.3 cm   - fever bajwa: covid, flu, Bcx and UA negative, LE duplex neg, TTE neg, watch off Abx, ID eval   - no evidence of bleeding, SHAY neg, hbg responded to 2 u prbc, no evidence of hemolysis,B12 wnl,  low folate, start folic acid and IV Iron, f/u Dr. Ochoa as hematology/Oncology  - C/w metoprolol   - Right lower lobe nodular, f/u in 3 mo w Pulm   - DVT ppx: Lovenox  - pending neuro bajwa  - spent 55 mins eval pt and coordinating care reviewed alll labs and images  63yro female with PMHx of HTN, HLD, hypothyroidism, hx of lymphoma (in remission), and cardiac myxoma with resection (approx 2020) presenting with fever, generalized weakness, dizziness, lightheadedness,    # Acute on Chronic normocytic anemia, symptomatic   # fever, Weakness, R sided vision changes  # brain MRI w nonenhancing lesions,   # r/u CNS disease, demyelinating dz, paraneoplastic VS others   # Hx of non hodgkin's lymphoma  # Hx of cardiac myxoma s/p resection   # HTN/ HLD      PLANs:    - MRI Patchy lesion in the inferior right cerebellum with petechial hemorrhage may represent subacute infarct.  - Multiple additional nonenhancing lesions throughout the bilateral periventricular white matters, splenium of the corpus callosum, and right urmila  - R/U demyelinating/autoimmune vs paraneoplastic   - needs LP  - CT abd:  Several hepatic hypodensities 0.7 cm, splenic hypodensity lesion measuring 2.3 cm   - fever bajwa: covid, flu, Bcx and UA negative, LE duplex neg, TTE neg, watch off Abx, ID eval   - no evidence of bleeding, SHAY neg, hbg responded to 2 u prbc, no evidence of hemolysis,B12 wnl,  low folate, start folic acid and IV Iron, f/u Dr. Ochoa as hematology/Oncology  - C/w levothyroxine, metoprolol, check TSH/FT4  - Right lower lobe nodular, f/u in 3 mo w Pulm   - DVT ppx: Lovenox  - pending neuro bajwa  - spent 55 mins eval pt and coordinating care reviewed alll labs and images

## 2024-10-20 NOTE — PROGRESS NOTE ADULT - SUBJECTIVE AND OBJECTIVE BOX
Patient is a 63y old  Female who presents with a chief complaint of Anemia (19 Oct 2024 21:38)      OVERNIGHT EVENTS: no major events reported but had 2 x fever yesterday     SUBJECTIVE / INTERVAL HPI: Patient seen and examined at bedside.     VITAL SIGNS:  Vital Signs Last 24 Hrs  T(C): 37.2 (20 Oct 2024 12:30), Max: 38.6 (19 Oct 2024 20:27)  T(F): 99 (20 Oct 2024 12:30), Max: 101.4 (19 Oct 2024 20:27)  HR: 98 (20 Oct 2024 12:30) (97 - 118)  BP: 104/69 (20 Oct 2024 12:30) (104/69 - 106/72)  BP(mean): 83 (20 Oct 2024 04:47) (79 - 83)  RR: 17 (20 Oct 2024 12:30) (17 - 19)  SpO2: 98% (20 Oct 2024 12:30) (95% - 98%)    Parameters below as of 20 Oct 2024 04:47  Patient On (Oxygen Delivery Method): room air        PHYSICAL EXAM:    General: WDWN  HEENT: NC/AT; PERRL, clear conjunctiva  Neck: supple  Cardiovascular: +S1/S2; RRR  Respiratory: CTA b/l; no W/R/R  Gastrointestinal: soft, NT/ND; +BSx4  Extremities: WWP; 2+ peripheral pulses; no edema   Neurological: AAOx3; no focal deficits    MEDICATIONS:  MEDICATIONS  (STANDING):  chlorhexidine 2% Cloths 1 Application(s) Topical <User Schedule>  enoxaparin Injectable 30 milliGRAM(s) SubCutaneous every 24 hours  influenza   Vaccine 0.5 milliLiter(s) IntraMuscular once  levothyroxine 50 MICROGram(s) Oral daily  metoprolol succinate ER 50 milliGRAM(s) Oral daily  pantoprazole    Tablet 40 milliGRAM(s) Oral before breakfast  polyethylene glycol 3350 17 Gram(s) Oral every 12 hours  senna 2 Tablet(s) Oral at bedtime    MEDICATIONS  (PRN):  acetaminophen     Tablet .. 650 milliGRAM(s) Oral every 6 hours PRN Temp greater or equal to 38C (100.4F), Mild Pain (1 - 3)  melatonin 3 milliGRAM(s) Oral at bedtime PRN Insomnia      ALLERGIES:  Allergies    penicillin (Unknown)    Intolerances        LABS:                        8.9    4.62  )-----------( 120      ( 20 Oct 2024 07:29 )             28.9     10-20    135  |  101  |  14  ----------------------------<  96  4.1   |  26  |  0.6[L]    Ca    7.5[L]      20 Oct 2024 07:29  Mg     1.9     10-20    TPro  6.9  /  Alb  2.0[L]  /  TBili  0.8  /  DBili  x   /  AST  17  /  ALT  <5  /  AlkPhos  118[H]  10-20      Urinalysis Basic - ( 20 Oct 2024 07:29 )    Color: x / Appearance: x / SG: x / pH: x  Gluc: 96 mg/dL / Ketone: x  / Bili: x / Urobili: x   Blood: x / Protein: x / Nitrite: x   Leuk Esterase: x / RBC: x / WBC x   Sq Epi: x / Non Sq Epi: x / Bacteria: x      CAPILLARY BLOOD GLUCOSE      < from: MR Head w/wo IV Cont (10.19.24 @ 10:28) >  Patchy lesion in the inferior right cerebellum with petechial hemorrhage   may represent subacute infarct.    Multiple additional nonenhancing lesions throughout the bilateral   periventricular white matters, splenium of the corpus callosum, and right   sabra  which are nonspecific in etiology. The differential considerations   include demyelinating disease, as well as paraneoplastic/autoimmune   encephalitis and lymphoma (less likely given lack of enhancement).   Restricted diffusion associated with the corpus callosal and right   pontine lesions could reflect acute demyelination in appropriate clinical   setting. CSF correlation can be considered.    < end of copied text >      RADIOLOGY & ADDITIONAL TESTS: Reviewed.    ASSESSMENT:    PLAN:

## 2024-10-21 ENCOUNTER — RESULT REVIEW (OUTPATIENT)
Age: 64
End: 2024-10-21

## 2024-10-21 LAB
ALBUMIN SERPL ELPH-MCNC: 2 G/DL — LOW (ref 3.5–5.2)
ALP SERPL-CCNC: 144 U/L — HIGH (ref 30–115)
ALT FLD-CCNC: <5 U/L — SIGNIFICANT CHANGE UP (ref 0–41)
ANION GAP SERPL CALC-SCNC: 8 MMOL/L — SIGNIFICANT CHANGE UP (ref 7–14)
APPEARANCE CSF: CLEAR — SIGNIFICANT CHANGE UP
AST SERPL-CCNC: 23 U/L — SIGNIFICANT CHANGE UP (ref 0–41)
BASOPHILS # BLD AUTO: 0.01 K/UL — SIGNIFICANT CHANGE UP (ref 0–0.2)
BASOPHILS NFR BLD AUTO: 0.2 % — SIGNIFICANT CHANGE UP (ref 0–1)
BILIRUB SERPL-MCNC: 0.6 MG/DL — SIGNIFICANT CHANGE UP (ref 0.2–1.2)
BUN SERPL-MCNC: 14 MG/DL — SIGNIFICANT CHANGE UP (ref 10–20)
CALCIUM SERPL-MCNC: 7.9 MG/DL — LOW (ref 8.4–10.5)
CHLORIDE SERPL-SCNC: 100 MMOL/L — SIGNIFICANT CHANGE UP (ref 98–110)
CO2 SERPL-SCNC: 26 MMOL/L — SIGNIFICANT CHANGE UP (ref 17–32)
COLOR CSF: COLORLESS — SIGNIFICANT CHANGE UP
CREAT SERPL-MCNC: 0.5 MG/DL — LOW (ref 0.7–1.5)
CSF PCR RESULT: SIGNIFICANT CHANGE UP
EGFR: 105 ML/MIN/1.73M2 — SIGNIFICANT CHANGE UP
EOSINOPHIL # BLD AUTO: 0.01 K/UL — SIGNIFICANT CHANGE UP (ref 0–0.7)
EOSINOPHIL NFR BLD AUTO: 0.2 % — SIGNIFICANT CHANGE UP (ref 0–8)
GLUCOSE CSF-MCNC: 41 MG/DL — LOW (ref 45–75)
GLUCOSE SERPL-MCNC: 86 MG/DL — SIGNIFICANT CHANGE UP (ref 70–99)
GRAM STN FLD: SIGNIFICANT CHANGE UP
GRAM STN FLD: SIGNIFICANT CHANGE UP
HCT VFR BLD CALC: 29.4 % — LOW (ref 37–47)
HGB BLD-MCNC: 9.1 G/DL — LOW (ref 12–16)
IMM GRANULOCYTES NFR BLD AUTO: 0.7 % — HIGH (ref 0.1–0.3)
LYMPHOCYTES # BLD AUTO: 0.92 K/UL — LOW (ref 1.2–3.4)
LYMPHOCYTES # BLD AUTO: 20.4 % — LOW (ref 20.5–51.1)
MAGNESIUM SERPL-MCNC: 2.1 MG/DL — SIGNIFICANT CHANGE UP (ref 1.8–2.4)
MCHC RBC-ENTMCNC: 25.9 PG — LOW (ref 27–31)
MCHC RBC-ENTMCNC: 31 G/DL — LOW (ref 32–37)
MCV RBC AUTO: 83.8 FL — SIGNIFICANT CHANGE UP (ref 81–99)
MONOCYTES # BLD AUTO: 0.89 K/UL — HIGH (ref 0.1–0.6)
MONOCYTES NFR BLD AUTO: 19.7 % — HIGH (ref 1.7–9.3)
NEUTROPHILS # BLD AUTO: 2.65 K/UL — SIGNIFICANT CHANGE UP (ref 1.4–6.5)
NEUTROPHILS # CSF: SIGNIFICANT CHANGE UP % (ref 0–6)
NEUTROPHILS NFR BLD AUTO: 58.8 % — SIGNIFICANT CHANGE UP (ref 42.2–75.2)
NRBC # BLD: 0 /100 WBCS — SIGNIFICANT CHANGE UP (ref 0–0)
NRBC NFR CSF: 1 /UL — SIGNIFICANT CHANGE UP (ref 0–5)
PLATELET # BLD AUTO: 107 K/UL — LOW (ref 130–400)
PMV BLD: 9.7 FL — SIGNIFICANT CHANGE UP (ref 7.4–10.4)
POTASSIUM SERPL-MCNC: 4.4 MMOL/L — SIGNIFICANT CHANGE UP (ref 3.5–5)
POTASSIUM SERPL-SCNC: 4.4 MMOL/L — SIGNIFICANT CHANGE UP (ref 3.5–5)
PROT CSF-MCNC: 187 MG/DL — HIGH (ref 15–45)
PROT SERPL-MCNC: 7 G/DL — SIGNIFICANT CHANGE UP (ref 6–8)
RBC # BLD: 3.51 M/UL — LOW (ref 4.2–5.4)
RBC # CSF: 943 /UL — HIGH (ref 0–0)
RBC # FLD: 18 % — HIGH (ref 11.5–14.5)
SODIUM SERPL-SCNC: 134 MMOL/L — LOW (ref 135–146)
SPECIMEN SOURCE: SIGNIFICANT CHANGE UP
T3FREE SERPL-MCNC: 1.19 PG/ML — LOW (ref 2–4.4)
TUBE TYPE: SIGNIFICANT CHANGE UP
WBC # BLD: 4.51 K/UL — LOW (ref 4.8–10.8)
WBC # FLD AUTO: 4.51 K/UL — LOW (ref 4.8–10.8)

## 2024-10-21 PROCEDURE — 99233 SBSQ HOSP IP/OBS HIGH 50: CPT

## 2024-10-21 PROCEDURE — 88189 FLOWCYTOMETRY/READ 16 & >: CPT

## 2024-10-21 PROCEDURE — 88108 CYTOPATH CONCENTRATE TECH: CPT | Mod: 26,59

## 2024-10-21 PROCEDURE — 62270 DX LMBR SPI PNXR: CPT

## 2024-10-21 RX ORDER — ASPIRIN/MAG CARB/ALUMINUM AMIN 325 MG
81 TABLET ORAL DAILY
Refills: 0 | Status: DISCONTINUED | OUTPATIENT
Start: 2024-10-21 | End: 2024-10-28

## 2024-10-21 RX ADMIN — Medication 2 TABLET(S): at 21:13

## 2024-10-21 RX ADMIN — FOLIC ACID 5 MILLIGRAM(S): 1 TABLET ORAL at 11:27

## 2024-10-21 RX ADMIN — IRON SUCROSE 100 MILLIGRAM(S): 20 INJECTION, SOLUTION INTRAVENOUS at 14:01

## 2024-10-21 RX ADMIN — Medication 81 MILLIGRAM(S): at 16:03

## 2024-10-21 RX ADMIN — Medication 80 MILLIGRAM(S): at 21:13

## 2024-10-21 NOTE — CONSULT NOTE ADULT - SUBJECTIVE AND OBJECTIVE BOX
TANISHA HOPKINS  63y, Female  Allergy: penicillin (Unknown)      CHIEF COMPLAINT:   Anemia (20 Oct 2024 12:58)      LOS  4d    HPI  HPI:  Patient is a 63yro female with PMHx of HTN, HLD, hypothyroidism, hx of lymphoma (in remission), and cardiac myxoma with resection (approx 2020) presenting with generalized weakness, dizziness, lightheadedness, and bilateral LE swelling. These symptoms have been ongoing for the past 2 years now, with episodes of syncope (last episode 3 mos ago), but the symptoms were worsening in the last two weeks along with a new R sided vision blurriness, intermittent headaches, and polydipsia. Today she went for a follow up with her cardiologist (Dr. Portillo), where it was found that her hgb was 7.5 and tachycardic, so she was sent to the ED. Patient denies any melena, hematochezia, N/V, or abdominal pain.   Of note, patient have been following with her hematologist/oncologist Dr. Ochoa for anemia workup in the past 2 years, including bone marrow biopsy, MRI, EGD/colonoscopy, and everything has been negative so far.  Patient denies any SOB, chest pain, bowel changes, cough, recent travel, recent illnesses.     In the ED, patient was initially febrile (Tmax 101.9), /65, , Sat 96%, RR 18  Patient was given 1x aztreonam and vancomycin, 1L LR, and tylenol, with the resolution of her fever.  Patient admitted for management of anemia.     Labs significant for:                 6.6    4.96  )-----------( 147      ( 10-17-24 @ 21:50 )              22.3     131  |  99  |  10  -------------------------<  115   10-17-24 @ 21:50  4.1  |  25  |  0.7    Ca      7.3     10-17-24 @ 21:50  Mg     1.9     10-17-24 @ 21:50    TPro  7.2  /  Alb  2.1  /  TBili  0.5  /  DBili  x   /  AST  29  /  ALT  <5  /  AlkPhos  112  /  GGT  x     10-17-24 @ 21:50    ProBNP 481  Trop 10  Lactate 2.5    UA -ve    RVP -ve     Imaging:  CXR: clear on wet read    EKG: Sinus tachycardia     (18 Oct 2024 02:59)      INFECTIOUS DISEASE HISTORY:  ID consulted for fever  Febrile on admission to 101.9. Continues fevers  BCX NG, UA without significant pyuria, Influenza/RSV/COVID19 PCR negative   CT nodular lung opacity      Currently ordered for: no antimicrobials       PMH  PAST MEDICAL & SURGICAL HISTORY:  Hypertension      Hyperlipemia      Hypothyroidism      History of lymphoma      Cardiac myxoma          FAMILY HISTORY      SOCIAL HISTORY  Social History:        ROS  ***    VITALS:  T(F): 97.5, Max: 100.9 (10-20-24 @ 21:48)  HR: 92  BP: 100/64  RR: 19Vital Signs Last 24 Hrs  T(C): 36.4 (21 Oct 2024 05:18), Max: 38.3 (20 Oct 2024 21:48)  T(F): 97.5 (21 Oct 2024 05:18), Max: 100.9 (20 Oct 2024 21:48)  HR: 92 (21 Oct 2024 05:18) (92 - 108)  BP: 100/64 (21 Oct 2024 05:18) (98/65 - 108/60)  BP(mean): --  RR: 19 (21 Oct 2024 05:18) (17 - 19)  SpO2: 98% (21 Oct 2024 05:18) (97% - 98%)    Parameters below as of 21 Oct 2024 05:18  Patient On (Oxygen Delivery Method): room air        PHYSICAL EXAM:  ***    TESTS & MEASUREMENTS:                        9.1    4.51  )-----------( 107      ( 21 Oct 2024 07:33 )             29.4     10-21    134[L]  |  100  |  14  ----------------------------<  86  4.4   |  26  |  0.5[L]    Ca    7.9[L]      21 Oct 2024 07:33  Mg     2.1     10-21    TPro  7.0  /  Alb  2.0[L]  /  TBili  0.6  /  DBili  x   /  AST  23  /  ALT  <5  /  AlkPhos  144[H]  10-21      LIVER FUNCTIONS - ( 21 Oct 2024 07:33 )  Alb: 2.0 g/dL / Pro: 7.0 g/dL / ALK PHOS: 144 U/L / ALT: <5 U/L / AST: 23 U/L / GGT: x           Urinalysis Basic - ( 21 Oct 2024 07:33 )    Color: x / Appearance: x / SG: x / pH: x  Gluc: 86 mg/dL / Ketone: x  / Bili: x / Urobili: x   Blood: x / Protein: x / Nitrite: x   Leuk Esterase: x / RBC: x / WBC x   Sq Epi: x / Non Sq Epi: x / Bacteria: x        Urinalysis with Rflx Culture (collected 10-18-24 @ 00:03)    Culture - Blood (collected 10-17-24 @ 22:08)  Source: .Blood BLOOD  Preliminary Report (10-21-24 @ 09:01):    No growth at 72 Hours    Culture - Blood (collected 10-17-24 @ 22:08)  Source: .Blood BLOOD  Preliminary Report (10-21-24 @ 09:01):    No growth at 72 Hours        Lactate, Blood: 2.5 mmol/L (10-18-24 @ 00:35)  Lactate, Blood: 2.2 mmol/L (10-17-24 @ 22:08)  Blood Gas Venous - Lactate: 2.6 mmol/L (10-17-24 @ 21:50)      INFECTIOUS DISEASES TESTING      INFLAMMATORY MARKERS      RADIOLOGY & ADDITIONAL TESTS:  I have personally reviewed the last Chest xray  CXR      CT  CT Angio Chest PE Protocol w/ IV Cont:   ACC: [id number] EXAM:  CT ANGIO CHEST PULM [facility]   ORDERED BY: CYRIL CUMMINGS     PROCEDURE DATE:  10/19/2024          INTERPRETATION:  CLINICAL INFORMATION: Shortness of breath, tachycardia,   brain lesions    COMPARISON: None.    CONTRAST/COMPLICATIONS:  IV Contrast: Omnipaque 350  65 cc administered   35 cc discarded  Oral Contrast: NONE  Complications: None reported at time of study completion    PROCEDURE:  CT Angiography of the Chest.  Sagittal and coronal reformats were performed as wellas 3D (MIP)   reconstructions.    FINDINGS:    LUNGS AND LARGE AIRWAYS: Patent central airways. Biapical linear   atelectasis/scarring.  Dependent atelectasis. Right lower lobe nodular density measuring   approximately 8 x 6 mm is unchanged image 111. Right upper lung   postsurgical changes.  PLEURA: No pleural effusion.  VESSELS: No pulmonary embolism.  HEART: Heart size is normal. No pericardial effusion.  MEDIASTINUM AND NORMAN: No lymphadenopathy.  CHEST WALL AND LOWER NECK: Within normal limits.  VISUALIZED UPPER ABDOMEN: Within normal limits.  BONES: Degenerative changes.    IMPRESSION:  No pulmonary emboli. No evidence of acute thoracic pathology.    Right lower lobe nodular density measuring approximately 8 x 6 mm is   unchanged image 111. Short-term follow-up CT in 3 months is recommended.    Communicated with Dr. Cummings on 10/19/2024 at 7AM.    --- End of Report ---          FAIZA VALLADARES MD; Resident Radiologist  This document has been electronically signed.  NEVAEH GRAYSON MD; Attending Radiologist  This document has been electronically signed. Oct 19 2024  7:16AM (10-19-24 @ 01:56)      CARDIOLOGY TESTING  12 Lead ECG:   Ventricular Rate 81 BPM    Atrial Rate 81 BPM    P-R Interval 150 ms <TRUNCATED> (10-18-24 @ 04:56)       MEDICATIONS  chlorhexidine 2% Cloths 1 Topical <User Schedule>  enoxaparin Injectable 30 SubCutaneous every 24 hours  folic acid 5 Oral daily  influenza   Vaccine 0.5 IntraMuscular once  iron sucrose IVPB 200 IV Intermittent <User Schedule>  levothyroxine 50 Oral daily  metoprolol succinate ER 50 Oral daily  pantoprazole    Tablet 40 Oral before breakfast  polyethylene glycol 3350 17 Oral every 12 hours  senna 2 Oral at bedtime      ANTIBIOTICS:      ALLERGIES:  penicillin (Unknown)           TANISHA HOPKINS  63y, Female  Allergy: penicillin (Unknown)      CHIEF COMPLAINT:   Anemia (20 Oct 2024 12:58)      LOS  4d    HPI  HPI:  Patient is a 63yro female with PMHx of HTN, HLD, hypothyroidism, hx of lymphoma (in remission), and cardiac myxoma with resection (approx 2020) presenting with generalized weakness, dizziness, lightheadedness, and bilateral LE swelling. These symptoms have been ongoing for the past 2 years now, with episodes of syncope (last episode 3 mos ago), but the symptoms were worsening in the last two weeks along with a new R sided vision blurriness, intermittent headaches, and polydipsia. Today she went for a follow up with her cardiologist (Dr. Portillo), where it was found that her hgb was 7.5 and tachycardic, so she was sent to the ED. Patient denies any melena, hematochezia, N/V, or abdominal pain.   Of note, patient have been following with her hematologist/oncologist Dr. Ochoa for anemia workup in the past 2 years, including bone marrow biopsy, MRI, EGD/colonoscopy, and everything has been negative so far.  Patient denies any SOB, chest pain, bowel changes, cough, recent travel, recent illnesses.     In the ED, patient was initially febrile (Tmax 101.9), /65, , Sat 96%, RR 18  Patient was given 1x aztreonam and vancomycin, 1L LR, and tylenol, with the resolution of her fever.  Patient admitted for management of anemia.     Labs significant for:                 6.6    4.96  )-----------( 147      ( 10-17-24 @ 21:50 )              22.3     131  |  99  |  10  -------------------------<  115   10-17-24 @ 21:50  4.1  |  25  |  0.7    Ca      7.3     10-17-24 @ 21:50  Mg     1.9     10-17-24 @ 21:50    TPro  7.2  /  Alb  2.1  /  TBili  0.5  /  DBili  x   /  AST  29  /  ALT  <5  /  AlkPhos  112  /  GGT  x     10-17-24 @ 21:50    ProBNP 481  Trop 10  Lactate 2.5    UA -ve    RVP -ve     Imaging:  CXR: clear on wet read    EKG: Sinus tachycardia     (18 Oct 2024 02:59)      INFECTIOUS DISEASE HISTORY:  ID consulted for fever  Febrile on admission to 101.9. Continues fevers  BCX NG, UA without significant pyuria, Influenza/RSV/COVID19 PCR negative   CT nodular lung opacity    Pt denies fevers reports wt loss 30lb over the past year + night sweats  Denies joint pains, Pt denies HA, rhinorrhea, sore throat, cough, SOB, abd pain, diarrhea, n/v, dysuria, rash  Born in the [employer], worked as an   In remission from lymphoma for about 12-15 years        Currently ordered for: no antimicrobials       PMH  PAST MEDICAL & SURGICAL HISTORY:  Hypertension      Hyperlipemia      Hypothyroidism      History of lymphoma      Cardiac myxoma          FAMILY HISTORY      SOCIAL HISTORY  Social History:        ROS  General: as noted above   HEENT: Denies headache, rhinorrhea, sore throat, eye pain  CV: Denies CP, palpitations  PULM: Denies wheezing, hemoptysis  GI: Denies hematemesis, hematochezia, melena  : Denies discharge, hematuria  MSK: Denies arthralgias, myalgias  SKIN: Denies rash, lesions  NEURO: Denies paresthesias, weakness  PSYCH: Denies depression, anxiety     VITALS:  T(F): 97.5, Max: 100.9 (10-20-24 @ 21:48)  HR: 92  BP: 100/64  RR: 19Vital Signs Last 24 Hrs  T(C): 36.4 (21 Oct 2024 05:18), Max: 38.3 (20 Oct 2024 21:48)  T(F): 97.5 (21 Oct 2024 05:18), Max: 100.9 (20 Oct 2024 21:48)  HR: 92 (21 Oct 2024 05:18) (92 - 108)  BP: 100/64 (21 Oct 2024 05:18) (98/65 - 108/60)  BP(mean): --  RR: 19 (21 Oct 2024 05:18) (17 - 19)  SpO2: 98% (21 Oct 2024 05:18) (97% - 98%)    Parameters below as of 21 Oct 2024 05:18  Patient On (Oxygen Delivery Method): room air        PHYSICAL EXAM:  Gen: NAD, resting in bed, thin appearing   HEENT: Normocephalic, atraumatic  Neck: supple, no lymphadenopathy  CV: Regular rate & regular rhythm  Lungs: decreased BS at bases, no fremitus  Abdomen: Soft, BS present  Ext: Warm, well perfused  Neuro: non focal, awake  Skin: no rash, no erythema  Lines: no phlebitis     TESTS & MEASUREMENTS:                        9.1    4.51  )-----------( 107      ( 21 Oct 2024 07:33 )             29.4     10-21    134[L]  |  100  |  14  ----------------------------<  86  4.4   |  26  |  0.5[L]    Ca    7.9[L]      21 Oct 2024 07:33  Mg     2.1     10-21    TPro  7.0  /  Alb  2.0[L]  /  TBili  0.6  /  DBili  x   /  AST  23  /  ALT  <5  /  AlkPhos  144[H]  10-21      LIVER FUNCTIONS - ( 21 Oct 2024 07:33 )  Alb: 2.0 g/dL / Pro: 7.0 g/dL / ALK PHOS: 144 U/L / ALT: <5 U/L / AST: 23 U/L / GGT: x           Urinalysis Basic - ( 21 Oct 2024 07:33 )    Color: x / Appearance: x / SG: x / pH: x  Gluc: 86 mg/dL / Ketone: x  / Bili: x / Urobili: x   Blood: x / Protein: x / Nitrite: x   Leuk Esterase: x / RBC: x / WBC x   Sq Epi: x / Non Sq Epi: x / Bacteria: x        Urinalysis with Rflx Culture (collected 10-18-24 @ 00:03)    Culture - Blood (collected 10-17-24 @ 22:08)  Source: .Blood BLOOD  Preliminary Report (10-21-24 @ 09:01):    No growth at 72 Hours    Culture - Blood (collected 10-17-24 @ 22:08)  Source: .Blood BLOOD  Preliminary Report (10-21-24 @ 09:01):    No growth at 72 Hours        Lactate, Blood: 2.5 mmol/L (10-18-24 @ 00:35)  Lactate, Blood: 2.2 mmol/L (10-17-24 @ 22:08)  Blood Gas Venous - Lactate: 2.6 mmol/L (10-17-24 @ 21:50)      INFECTIOUS DISEASES TESTING      INFLAMMATORY MARKERS      RADIOLOGY & ADDITIONAL TESTS:  I have personally reviewed the last Chest xray  CXR      CT  CT Angio Chest PE Protocol w/ IV Cont:   ACC: [id number] EXAM:  CT ANGIO CHEST PULM [facility]   ORDERED BY: CYRIL CUMMINGS     PROCEDURE DATE:  10/19/2024          INTERPRETATION:  CLINICAL INFORMATION: Shortness of breath, tachycardia,   brain lesions    COMPARISON: None.    CONTRAST/COMPLICATIONS:  IV Contrast: Omnipaque 350  65 cc administered   35 cc discarded  Oral Contrast: NONE  Complications: None reported at time of study completion    PROCEDURE:  CT Angiography of the Chest.  Sagittal and coronal reformats were performed as wellas 3D (MIP)   reconstructions.    FINDINGS:    LUNGS AND LARGE AIRWAYS: Patent central airways. Biapical linear   atelectasis/scarring.  Dependent atelectasis. Right lower lobe nodular density measuring   approximately 8 x 6 mm is unchanged image 111. Right upper lung   postsurgical changes.  PLEURA: No pleural effusion.  VESSELS: No pulmonary embolism.  HEART: Heart size is normal. No pericardial effusion.  MEDIASTINUM AND NORMAN: No lymphadenopathy.  CHEST WALL AND LOWER NECK: Within normal limits.  VISUALIZED UPPER ABDOMEN: Within normal limits.  BONES: Degenerative changes.    IMPRESSION:  No pulmonary emboli. No evidence of acute thoracic pathology.    Right lower lobe nodular density measuring approximately 8 x 6 mm is   unchanged image 111. Short-term follow-up CT in 3 months is recommended.    Communicated with Dr. Cummings on 10/19/2024 at 7AM.    --- End of Report ---          FAIZA VALLADARES MD; Resident Radiologist  This document has been electronically signed.  NEVAEH GRAYSON MD; Attending Radiologist  This document has been electronically signed. Oct 19 2024  7:16AM (10-19-24 @ 01:56)      CARDIOLOGY TESTING  12 Lead ECG:   Ventricular Rate 81 BPM    Atrial Rate 81 BPM    P-R Interval 150 ms <TRUNCATED> (10-18-24 @ 04:56)       MEDICATIONS  chlorhexidine 2% Cloths 1 Topical <User Schedule>  enoxaparin Injectable 30 SubCutaneous every 24 hours  folic acid 5 Oral daily  influenza   Vaccine 0.5 IntraMuscular once  iron sucrose IVPB 200 IV Intermittent <User Schedule>  levothyroxine 50 Oral daily  metoprolol succinate ER 50 Oral daily  pantoprazole    Tablet 40 Oral before breakfast  polyethylene glycol 3350 17 Oral every 12 hours  senna 2 Oral at bedtime      ANTIBIOTICS:      ALLERGIES:  penicillin (Unknown)

## 2024-10-21 NOTE — PROGRESS NOTE ADULT - ASSESSMENT
63yro female with PMHx of HTN, HLD, hypothyroidism, hx of lymphoma (in remission), and cardiac myxoma with resection (approx 2020) presenting with fever, generalized weakness, dizziness, lightheadedness,    # Acute on Chronic normocytic anemia, symptomatic   # fever, Weakness, R sided vision changes  # brain MRI w nonenhancing lesions,   # r/u CNS disease, demyelinating dz, paraneoplastic VS others   # Hx of non hodgkin's lymphoma  # Hx of cardiac myxoma s/p resection   # HTN/ HLD      PLANs:    - MRI Patchy lesion in the inferior right cerebellum with petechial hemorrhage may represent subacute infarct.  - Multiple additional nonenhancing lesions throughout the bilateral periventricular white matters, splenium of the corpus callosum, and right urmila  - R/U demyelinating/autoimmune vs paraneoplastic   - needs LP send all studies in neuro note   - send autoimmune profile   - HIV, hepatitis panel   - TTE w buble to r/u PFO   - CT abd:  Several hepatic hypodensities 0.7 cm, splenic hypodensity lesion measuring 2.3 cm   - fever bajwa: covid, flu, Bcx and UA negative, LE duplex neg, TTE neg, watch off Abx, ID eval   - no evidence of bleeding, SHAY neg, hbg responded to 2 u prbc, no evidence of hemolysis,B12 wnl,  low folate, start folic acid and IV Iron, f/u Dr. Ochoa as hematology/Oncology  - C/w levothyroxine, metoprolol, f/u TSH/FT4  - Right lower lobe nodular, f/u in 3 mo w Pulm   - DVT ppx: Lovenox  - pending neuro bajwa  - spent 55 mins eval pt and coordinating care reviewed alll labs and images  63yro female with PMHx of HTN, HLD, hypothyroidism, hx of lymphoma (in remission), and cardiac myxoma with resection (approx 2020) presenting with fever, generalized weakness, dizziness, lightheadedness,    # Acute on Chronic normocytic anemia, symptomatic   # fever, Weakness, R sided vision changes  # brain MRI w nonenhancing lesions,   # r/u CNS disease, demyelinating dz, paraneoplastic VS others   # Hx of non hodgkin's lymphoma  # Hx of cardiac myxoma s/p resection   # HTN/ HLD      PLANs:    - MRI Patchy lesion in the inferior right cerebellum with petechial hemorrhage may represent subacute infarct.  - Multiple additional nonenhancing lesions throughout the bilateral periventricular white matters, splenium of the corpus callosum, and right urmila  - R/U demyelinating/autoimmune disorders vs paraneoplastic   - needs LP send all studies in neuro note   - send autoimmune profile   - HIV, hepatitis panel   - TTE w buble to r/u PFO   - CT abd:  Several hepatic hypodensities 0.7 cm, splenic hypodensity lesion measuring 2.3 cm   - fever bajwa: covid, flu, Bcx and UA negative, LE duplex neg, TTE neg, watch off Abx, ID eval   - no evidence of bleeding, SHAY neg, hbg responded to 2 u prbc, no evidence of hemolysis,B12 wnl,  low folate, start folic acid and IV Iron, f/u Dr. Ochoa as hematology/Oncology  - C/w levothyroxine, metoprolol, f/u TSH/FT4  - Right lower lobe nodular, f/u in 3 mo w Pulm   - DVT ppx: Lovenox  - pending neuro bajwa  - spent 55 mins eval pt and coordinating care reviewed alll labs and images  63yro female with PMHx of HTN, HLD, hypothyroidism, hx of lymphoma (in remission), and cardiac myxoma with resection (approx 2020) presenting with fever, generalized weakness, dizziness, lightheadedness,    # Acute on Chronic normocytic anemia, symptomatic   # fever, Weakness, R sided vision changes  # brain MRI w nonenhancing lesions,   # r/u CNS disease, demyelinating dz, paraneoplastic VS others   # Hx of non hodgkin's lymphoma  # Hx of cardiac myxoma s/p resection   # HTN/ HLD      PLANs:    - MRI Patchy lesion in the inferior right cerebellum with petechial hemorrhage may represent subacute infarct.  - Multiple additional nonenhancing lesions throughout the bilateral periventricular white matters, splenium of the corpus callosum, and right urmila  - R/U demyelinating/autoimmune disorders vs paraneoplastic   - needs LP send all studies in neuro note, will dw neuro after LP result, might need pulse steroid   - send autoimmune profile   - HIV, hepatitis panel   - TTE w buble to r/u PFO given multiple areas of lesion to r/u possible emboli events   - Ophthalmology eval   - CT abd:  Several hepatic hypodensities 0.7 cm, splenic hypodensity lesion measuring 2.3 cm   - fever bajwa: covid, flu, Bcx and UA negative, LE duplex neg, TTE neg, watch off Abx, ID eval   - no evidence of bleeding, SHAY neg, hbg responded to 2 u prbc, no evidence of hemolysis,B12 wnl,  low folate, start folic acid and IV Iron D2, f/u Dr. Ochoa as hematology/Oncology  - C/w levothyroxine, metoprolol, f/u TSH/FT4  - Right lower lobe nodular, f/u in 3 mo w Pulm   - DVT ppx: Lovenox  - pending neuro bajwa   63yro female with PMHx of HTN, HLD, hypothyroidism, hx of lymphoma (in remission), and cardiac myxoma with resection (approx 2020) presenting with fever, generalized weakness, dizziness, lightheadedness,    # Acute on Chronic normocytic anemia, symptomatic   # fever, Weakness, R sided vision changes  # brain MRI w nonenhancing lesions,   # r/u CNS disease, demyelinating dz, paraneoplastic VS others   # Hx of non hodgkin's lymphoma  # Hx of cardiac myxoma s/p resection   # HTN/ HLD      PLANs:    - MRI Patchy lesion in the inferior right cerebellum with petechial hemorrhage may represent subacute infarct.  - Multiple additional nonenhancing lesions throughout the bilateral periventricular white matters, splenium of the corpus callosum, and right urmila  - R/U demyelinating/autoimmune disorders vs paraneoplastic   - needs LP send all studies in neuro note, will dw neuro after LP result, might need pulse steroid   - send autoimmune profile   - HIV, hepatitis panel   - TTE w buble to r/u PFO given multiple areas of lesion to r/u possible emboli events   - MRI C and T spine w/ and w/o to ensure no other lesiosn  - Ophthalmology eval   - CT abd:  Several hepatic hypodensities 0.7 cm, splenic hypodensity lesion measuring 2.3 cm   - fever bajwa: covid, flu, Bcx and UA negative, LE duplex neg, TTE neg, watch off Abx, ID eval   - no evidence of bleeding, SHAY neg, hbg responded to 2 u prbc, no evidence of hemolysis,B12 wnl,  low folate, start folic acid and IV Iron D2, f/u Dr. Ochoa as hematology/Oncology  - C/w levothyroxine, metoprolol, f/u TSH/FT4  - Right lower lobe nodular, f/u in 3 mo w Pulm   - DVT ppx: Lovenox  - pending work up    63yro female with PMHx of HTN, HLD, hypothyroidism, hx of lymphoma (in remission), and cardiac myxoma with resection (approx 2020) presenting with fever, generalized weakness, dizziness, lightheadedness,    # Acute on Chronic normocytic anemia, symptomatic   # fever, Weakness, R sided vision changes  # brain MRI w nonenhancing lesions,   # r/u CNS disease, demyelinating dz, paraneoplastic VS others   # Hx of non hodgkin's lymphoma  # Hx of cardiac myxoma s/p resection   # HTN/ HLD      PLANs:    - MRI Patchy lesion in the inferior right cerebellum with petechial hemorrhage may represent subacute infarct. started ASA and Lipitor per Neuro   - Multiple additional nonenhancing lesions throughout the bilateral periventricular white matters, splenium of the corpus callosum, and right urmila  - R/U demyelinating/autoimmune disorders vs paraneoplastic   - still have intermittent fever, last on 10/21 evening   - needs LP send all studies in neuro note, will dw neuro after LP result, might need pulse steroid   - send autoimmune profile   - HIV, hepatitis panel   - TTE w buble to r/u PFO given multiple areas of lesion to r/u possible emboli events   - MRI C and T spine w/ and w/o to ensure no other lesiosn  - Ophthalmology eval   - CT abd:  Several hepatic hypodensities 0.7 cm, splenic hypodensity lesion measuring 2.3 cm   - fever bajwa: covid, flu, Bcx and UA negative, LE duplex neg, TTE neg, watch off Abx, ID eval   - no evidence of bleeding, SHAY neg, hbg responded to 2 u prbc, no evidence of hemolysis,B12 wnl,  low folate, start folic acid and IV Iron D2, f/u Dr. Ochoa as hematology/Oncology  - C/w levothyroxine, metoprolol, f/u TSH/FT4  - Right lower lobe nodular, f/u in 3 mo w Pulm   - DVT ppx: Lovenox  - pending work up   - spent 55 mins eval pt and coordinating care , reviewed all albs and images, plans dw medical team   - plans dw the pt and  at bedside, all qs answered

## 2024-10-21 NOTE — CONSULT NOTE ADULT - NS ATTEST RISK PROBLEM GEN_ALL_CORE FT
- I independently interpreted the most recent     CXR/ CT with RLL opacity, no clinical symptoms to suggest PNA    - I discussed my recommendations with the primary team housestaff / Attending

## 2024-10-21 NOTE — PROCEDURE NOTE - PROCEDURE DATE TIME, MLM
21-Oct-2024 14:07 Imiquimod Counseling:  I discussed with the patient the risks of imiquimod including but not limited to erythema, scaling, itching, weeping, crusting, and pain.  Patient understands that the inflammatory response to imiquimod is variable from person to person and was educated regarded proper titration schedule.  If flu-like symptoms develop, patient knows to discontinue the medication and contact us.

## 2024-10-21 NOTE — PROGRESS NOTE ADULT - SUBJECTIVE AND OBJECTIVE BOX
24H events:    Patient is a 63y old Female who presents with a chief complaint of Anemia (21 Oct 2024 10:02)    Primary diagnosis of Fever          Today is hospital day 4d.   This morning patient was seen and examined at bedside, resting comfortably in bed.    No acute or major events overnight.      PAST MEDICAL & SURGICAL HISTORY  Hypertension    Hyperlipemia    Hypothyroidism    History of lymphoma    Cardiac myxoma      SOCIAL HISTORY:  Social History:      ALLERGIES:  penicillin (Unknown)    MEDICATIONS:  STANDING MEDICATIONS  aspirin enteric coated 81 milliGRAM(s) Oral daily  atorvastatin 80 milliGRAM(s) Oral at bedtime  chlorhexidine 2% Cloths 1 Application(s) Topical <User Schedule>  enoxaparin Injectable 30 milliGRAM(s) SubCutaneous every 24 hours  folic acid 5 milliGRAM(s) Oral daily  influenza   Vaccine 0.5 milliLiter(s) IntraMuscular once  iron sucrose IVPB 200 milliGRAM(s) IV Intermittent <User Schedule>  levothyroxine 50 MICROGram(s) Oral daily  metoprolol succinate ER 50 milliGRAM(s) Oral daily  pantoprazole    Tablet 40 milliGRAM(s) Oral before breakfast  polyethylene glycol 3350 17 Gram(s) Oral every 12 hours  senna 2 Tablet(s) Oral at bedtime    PRN MEDICATIONS  acetaminophen     Tablet .. 650 milliGRAM(s) Oral every 6 hours PRN  melatonin 3 milliGRAM(s) Oral at bedtime PRN    VITALS:   T(F): 98.6  HR: 89  BP: 97/64  RR: 18  SpO2: 97%    PHYSICAL EXAM:  GENERAL: NAD, lying in bed comfortably  HEAD:  Atraumatic, Normocephalic  EYES: EOMI, PERRLA, conjunctiva and sclera clear  ENT: Moist mucous membranes  NECK: Supple, No JVD  CHEST/LUNG: Clear to auscultation bilaterally; No rales, rhonchi, wheezing, or rubs. Unlabored respirations  HEART: Regular rate and rhythm; No murmurs, rubs, or gallops  ABDOMEN: BSx4; Soft, nontender, nondistended  EXTREMITIES:  2+ Peripheral Pulses, brisk capillary refill. No clubbing, cyanosis, or edema  NERVOUS SYSTEM:  A&Ox3, no focal deficits   SKIN: No rashes or lesions      LABS:                        9.1    4.51  )-----------( 107      ( 21 Oct 2024 07:33 )             29.4     10-21    134[L]  |  100  |  14  ----------------------------<  86  4.4   |  26  |  0.5[L]    Ca    7.9[L]      21 Oct 2024 07:33  Mg     2.1     10-21    TPro  7.0  /  Alb  2.0[L]  /  TBili  0.6  /  DBili  x   /  AST  23  /  ALT  <5  /  AlkPhos  144[H]  10-21      Urinalysis Basic - ( 21 Oct 2024 07:33 )    Color: x / Appearance: x / SG: x / pH: x  Gluc: 86 mg/dL / Ketone: x  / Bili: x / Urobili: x   Blood: x / Protein: x / Nitrite: x   Leuk Esterase: x / RBC: x / WBC x   Sq Epi: x / Non Sq Epi: x / Bacteria: x                RADIOLOGY:  CXR      CT  CT Angio Chest PE Protocol w/ IV Cont:   ACC: [id number] EXAM:  CT ANGIO CHEST PULM ART WAWI   ORDERED BY: CYRIL CUMMINGS     PROCEDURE DATE:  10/19/2024          INTERPRETATION:  CLINICAL INFORMATION: Shortness of breath, tachycardia,   brain lesions    COMPARISON: None.    CONTRAST/COMPLICATIONS:  IV Contrast: Omnipaque 350  65 cc administered   35 cc discarded  Oral Contrast: NONE  Complications: None reported at time of study completion    PROCEDURE:  CT Angiography of the Chest.  Sagittal and coronal reformats were performed as wellas 3D (MIP)   reconstructions.    FINDINGS:    LUNGS AND LARGE AIRWAYS: Patent central airways. Biapical linear   atelectasis/scarring.  Dependent atelectasis. Right lower lobe nodular density measuring   approximately 8 x 6 mm is unchanged image 111. Right upper lung   postsurgical changes.  PLEURA: No pleural effusion.  VESSELS: No pulmonary embolism.  HEART: Heart size is normal. No pericardial effusion.  MEDIASTINUM AND NORMAN: No lymphadenopathy.  CHEST WALL AND LOWER NECK: Within normal limits.  VISUALIZED UPPER ABDOMEN: Within normal limits.  BONES: Degenerative changes.    IMPRESSION:  No pulmonary emboli. No evidence of acute thoracic pathology.    Right lower lobe nodular density measuring approximately 8 x 6 mm is   unchanged image 111. Short-term follow-up CT in 3 months is recommended.    Communicated with Dr. Cummings on 10/19/2024 at 7AM.    --- End of Report ---          FAIZA VALLADARES MD; Resident Radiologist  This document has been electronically signed.  NEVAEH GRAYSON MD; Attending Radiologist  This document has been electronically signed. Oct 19 2024  7:16AM (10-19-24 @ 01:56)

## 2024-10-21 NOTE — PROCEDURE NOTE - SUPERVISORY STATEMENT
Procedure team was consulted to perform Lumbar Puncture urgent procedure for the diagnosis of rule out demyelinating disease I was present for the key critical aspects of the procedure performed during the care of the patient.

## 2024-10-21 NOTE — CONSULT NOTE ADULT - ASSESSMENT
ASSESSMENT   63yro female with PMHx of HTN, HLD, hypothyroidism, hx of lymphoma (in remission), and cardiac myxoma with resection (approx 2020) presenting with generalized weakness, dizziness, lightheadedness, and bilateral LE swelling. These symptoms have been ongoing for the past 2 years now, with episodes of syncope (last episode 3 mos ago), but the symptoms were worsening in the last two weeks along with a new R sided vision blurriness, intermittent headaches, and polydipsia. Today she went for a follow up with her cardiologist (Dr. Portillo), where it was found that her hgb was 7.5 and tachycardic, so she was sent to the ED.     IMPRESSION  #Fevers    10/19 BCX NGTD     UA without significant pyuria     Influenza/RSV/COVID19 PCR negative     Ferritin 2642    CT No pulmonary emboli. No evidence of acute thoracic pathology.  Right lower lobe nodular density measuring approximately 8 x 6 mm is unchanged image 111. Short-term follow-up CT in 3 months is recommended.  < from: CT Abdomen and Pelvis w/ IV Cont (10.18.24 @ 11:02) >  Questionable small volume free pelvic fluid of indeterminate etiology.  Indeterminate hepatic and splenic hypodensities. Recommend outpatient MRI abdomen with and without contrast for further evaluation.  Splenomegaly.  < from: MR Head w/wo IV Cont (10.19.24 @ 10:28) >  Patchy lesion in the inferior right cerebellum with petechial hemorrhage may represent subacute infarct.  Multiple additional nonenhancing lesions throughout the bilateral periventricular white matters, splenium of the corpus callosum, and right sabra  which are nonspecific in etiology.  #Hx lymphoma in remission  #Lactic acidosis  #Abx allergy: penicillin (Unknown)    Creatinine: 0.5 (10-21-24 @ 07:33)    Height (cm): 149.9 (10-17-24 @ 22:46)  Weight (kg): 46.3 (10-17-24 @ 22:46)    RECOMMENDATIONS  This is an incomplete consult note. All final recommendations to follow after interview and examination of the patient. Please follow recommendations noted below.    If any questions, please send a message or call on [employer] Teams  Please continue to update ID with any pertinent new laboratory, radiographic findings, or change in clinical status ASSESSMENT   63yro female with PMHx of HTN, HLD, hypothyroidism, hx of lymphoma (in remission), and cardiac myxoma with resection (approx 2020) presenting with generalized weakness, dizziness, lightheadedness, and bilateral LE swelling. These symptoms have been ongoing for the past 2 years now, with episodes of syncope (last episode 3 mos ago), but the symptoms were worsening in the last two weeks along with a new R sided vision blurriness, intermittent headaches, and polydipsia. Today she went for a follow up with her cardiologist (Dr. Portillo), where it was found that her hgb was 7.5 and tachycardic, so she was sent to the ED.     IMPRESSION  #Fevers  High suspicion for malignancy as monocytosis, lung findings, splenic/liver/brain findings, elevated ferritin    10/19 BCX NGTD     UA without significant pyuria     Influenza/RSV/COVID19 PCR negative     Ferritin 2642    CT No pulmonary emboli. No evidence of acute thoracic pathology.  Right lower lobe nodular density measuring approximately 8 x 6 mm is unchanged image 111. Short-term follow-up CT in 3 months is recommended.  < from: CT Abdomen and Pelvis w/ IV Cont (10.18.24 @ 11:02) >  Questionable small volume free pelvic fluid of indeterminate etiology.  Indeterminate hepatic and splenic hypodensities. Recommend outpatient MRI abdomen with and without contrast for further evaluation.  Splenomegaly.  < from: MR Head w/wo IV Cont (10.19.24 @ 10:28) >  Patchy lesion in the inferior right cerebellum with petechial hemorrhage may represent subacute infarct.  Multiple additional nonenhancing lesions throughout the bilateral periventricular white matters, splenium of the corpus callosum, and right sabra  which are nonspecific in etiology.  #Hx lymphoma in remission  #Lactic acidosis  #Abx allergy: penicillin (Unknown)    Creatinine: 0.5 (10-21-24 @ 07:33)    Height (cm): 149.9 (10-17-24 @ 22:46)  Weight (kg): 46.3 (10-17-24 @ 22:46)    RECOMMENDATIONS  - f/u malignancy workup per primary team  - quantiferon gold  - HIV Ab/Ag  - Monitor off antimicrobials   - Heme/Onc   - HLH workup per primary team     If any questions, please send a message or call on [employer] Teams  Please continue to update ID with any pertinent new laboratory, radiographic findings, or change in clinical status

## 2024-10-22 ENCOUNTER — TRANSCRIPTION ENCOUNTER (OUTPATIENT)
Age: 64
End: 2024-10-22

## 2024-10-22 LAB
ALBUMIN SERPL ELPH-MCNC: 1.9 G/DL — LOW (ref 3.5–5.2)
ALP SERPL-CCNC: 170 U/L — HIGH (ref 30–115)
ALT FLD-CCNC: <5 U/L — SIGNIFICANT CHANGE UP (ref 0–41)
ANION GAP SERPL CALC-SCNC: 11 MMOL/L — SIGNIFICANT CHANGE UP (ref 7–14)
AST SERPL-CCNC: 28 U/L — SIGNIFICANT CHANGE UP (ref 0–41)
BILIRUB SERPL-MCNC: 0.6 MG/DL — SIGNIFICANT CHANGE UP (ref 0.2–1.2)
BUN SERPL-MCNC: 10 MG/DL — SIGNIFICANT CHANGE UP (ref 10–20)
CALCIUM SERPL-MCNC: 7.5 MG/DL — LOW (ref 8.4–10.5)
CHLORIDE SERPL-SCNC: 99 MMOL/L — SIGNIFICANT CHANGE UP (ref 98–110)
CO2 SERPL-SCNC: 22 MMOL/L — SIGNIFICANT CHANGE UP (ref 17–32)
CREAT SERPL-MCNC: 0.5 MG/DL — LOW (ref 0.7–1.5)
CRP SERPL-MCNC: 124 MG/L — HIGH
CRYPTOC AG CSF-ACNC: NEGATIVE — SIGNIFICANT CHANGE UP
EGFR: 105 ML/MIN/1.73M2 — SIGNIFICANT CHANGE UP
ERYTHROCYTE [SEDIMENTATION RATE] IN BLOOD: 120 MM/HR — HIGH (ref 0–20)
GLUCOSE SERPL-MCNC: 85 MG/DL — SIGNIFICANT CHANGE UP (ref 70–99)
MAGNESIUM SERPL-MCNC: 2.1 MG/DL — SIGNIFICANT CHANGE UP (ref 1.8–2.4)
NIGHT BLUE STAIN TISS: SIGNIFICANT CHANGE UP
NON-GYNECOLOGICAL CYTOLOGY STUDY: SIGNIFICANT CHANGE UP
POTASSIUM SERPL-MCNC: 4.4 MMOL/L — SIGNIFICANT CHANGE UP (ref 3.5–5)
POTASSIUM SERPL-SCNC: 4.4 MMOL/L — SIGNIFICANT CHANGE UP (ref 3.5–5)
PROCALCITONIN SERPL-MCNC: 0.77 NG/ML — HIGH (ref 0.02–0.1)
PROT SERPL-MCNC: 6.6 G/DL — SIGNIFICANT CHANGE UP (ref 6–8.3)
PROT SERPL-MCNC: 6.8 G/DL — SIGNIFICANT CHANGE UP (ref 6–8)
RHEUMATOID FACT SERPL-ACNC: 10 IU/ML — SIGNIFICANT CHANGE UP (ref 0–13)
SODIUM SERPL-SCNC: 132 MMOL/L — LOW (ref 135–146)
SPECIMEN SOURCE: SIGNIFICANT CHANGE UP
T4 AB SER-ACNC: 4.8 UG/DL — SIGNIFICANT CHANGE UP (ref 4.6–12)
TSH SERPL-MCNC: 5.68 UIU/ML — HIGH (ref 0.27–4.2)

## 2024-10-22 PROCEDURE — 99233 SBSQ HOSP IP/OBS HIGH 50: CPT

## 2024-10-22 PROCEDURE — 99254 IP/OBS CNSLTJ NEW/EST MOD 60: CPT

## 2024-10-22 PROCEDURE — 70496 CT ANGIOGRAPHY HEAD: CPT | Mod: 26

## 2024-10-22 PROCEDURE — 99232 SBSQ HOSP IP/OBS MODERATE 35: CPT

## 2024-10-22 PROCEDURE — 72157 MRI CHEST SPINE W/O & W/DYE: CPT | Mod: 26

## 2024-10-22 PROCEDURE — 72156 MRI NECK SPINE W/O & W/DYE: CPT | Mod: 26

## 2024-10-22 PROCEDURE — 70498 CT ANGIOGRAPHY NECK: CPT | Mod: 26

## 2024-10-22 RX ADMIN — Medication 30 MILLIGRAM(S): at 12:09

## 2024-10-22 RX ADMIN — PANTOPRAZOLE SODIUM 40 MILLIGRAM(S): 40 TABLET, DELAYED RELEASE ORAL at 05:51

## 2024-10-22 RX ADMIN — Medication 81 MILLIGRAM(S): at 12:09

## 2024-10-22 RX ADMIN — FOLIC ACID 5 MILLIGRAM(S): 1 TABLET ORAL at 12:09

## 2024-10-22 RX ADMIN — Medication 650 MILLIGRAM(S): at 13:48

## 2024-10-22 RX ADMIN — CHLORHEXIDINE GLUCONATE 1 APPLICATION(S): 40 SOLUTION TOPICAL at 05:53

## 2024-10-22 RX ADMIN — Medication 2 TABLET(S): at 22:08

## 2024-10-22 RX ADMIN — Medication 80 MILLIGRAM(S): at 22:07

## 2024-10-22 RX ADMIN — Medication 50 MICROGRAM(S): at 05:52

## 2024-10-22 RX ADMIN — IRON SUCROSE 100 MILLIGRAM(S): 20 INJECTION, SOLUTION INTRAVENOUS at 16:04

## 2024-10-22 NOTE — DISCHARGE NOTE PROVIDER - CARE PROVIDERS DIRECT ADDRESSES
,[email or web address] ,[email or web address] ,[email or web address]

## 2024-10-22 NOTE — CONSULT NOTE ADULT - SUBJECTIVE AND OBJECTIVE BOX
TANISHA HOPKINS  MRN-[id number]  63y Female        Patient is a 63y old  Female who presents with a chief complaint of Anemia (22 Oct 2024 14:14)    HEALTH ISSUES - R/O PROBLEM Dx:    HPI:  Patient is a 63yro female with PMHx of HTN, HLD, hypothyroidism, hx of lymphoma (in remission), and cardiac myxoma with resection (approx 2020) presenting with generalized weakness, dizziness, lightheadedness, and bilateral LE swelling. These symptoms have been ongoing for the past 2 years now, with episodes of syncope (last episode 3 mos ago), but the symptoms were worsening in the last two weeks along with a new R sided vision blurriness, intermittent headaches, and polydipsia. Today she went for a follow up with her cardiologist (Dr. Portillo), where it was found that her hgb was 7.5 and tachycardic, so she was sent to the ED. Patient denies any melena, hematochezia, N/V, or abdominal pain.   Of note, patient have been following with her hematologist/oncologist Dr. Ochoa for anemia workup in the past 2 years, including bone marrow biopsy, MRI, EGD/colonoscopy, and everything has been negative so far.  Patient denies any SOB, chest pain, bowel changes, cough, recent travel, recent illnesses.     In the ED, patient was initially febrile (Tmax 101.9), /65, , Sat 96%, RR 18  Patient was given 1x aztreonam and vancomycin, 1L LR, and tylenol, with the resolution of her fever.  Patient admitted for management of anemia.     Labs significant for:                 6.6    4.96  )-----------( 147      ( 10-17-24 @ 21:50 )              22.3     131  |  99  |  10  -------------------------<  115   10-17-24 @ 21:50  4.1  |  25  |  0.7    Ca      7.3     10-17-24 @ 21:50  Mg     1.9     10-17-24 @ 21:50    TPro  7.2  /  Alb  2.1  /  TBili  0.5  /  DBili  x   /  AST  29  /  ALT  <5  /  AlkPhos  112  /  GGT  x     10-17-24 @ 21:50    ProBNP 481  Trop 10  Lactate 2.5    UA -ve    RVP -ve     Imaging:  CXR: clear on wet read    EKG: Sinus tachycardia    Vitals  T(C): 36.9 (10-18-24 @ 00:15), Max: 38.8 (10-17-24 @ 22:08)  T(F): 98.5 (10-18-24 @ 00:15), Max: 101.9 (10-17-24 @ 22:08)  HR: 85 (10-18-24 @ 00:15) (85 - 113)  BP: 100/65 (10-18-24 @ 00:15) (100/65 - 116/68)  RR: 18 (10-18-24 @ 00:15) (18 - 20)  SpO2: 100% (10-18-24 @ 00:15) (95% - 100%)  Wt(kg): --    Review of Systems  CONSTITUTIONAL:  Weakness, no fevers or chills  EYES/ENT:  R side blurry vision;  No vertigo or throat pain   NECK:  No pain or stiffness  RESPIRATORY:  No cough, wheezing, hemoptysis; No shortness of breath  CARDIOVASCULAR:  No chest pain or palpitations  GASTROINTESTINAL:  No abdominal or epigastric pain. No nausea, vomiting, or hematemesis; No diarrhea, + constipation. No melena or hematochezia.  GENITOURINARY:  No dysuria, frequency or hematuria  MUSCULOSKELETAL: No muscle pains or joint pains  NEUROLOGICAL:  No numbness, + weakness, lightheadedness, dizziness, headaches  SKIN:  No itching, rashes      Physical Exam  GENERAL: AAOx3. Well-nourished, laying in bed appearing pale and weak  HEENT: atraumatic, normocephalic, moist mucus membranes, pale mucus membranes   LUNGS: Clear to auscultation bilaterally  HEART: S1/S2. No heaves or thrills  ABD: Soft, non-tender, non-distended. tender in the paraumbilic region  EXT/NEURO: Strength, sensation, ROM grossly intact but weak, B/L 2+ pitting edema  SKIN: No breaks, erythema, edema   (18 Oct 2024 02:59)    PAST MEDICAL & SURGICAL HISTORY:  Hypertension      Hyperlipemia      Hypothyroidism      History of lymphoma      Cardiac myxoma        MEDICATIONS  (STANDING):  aspirin enteric coated 81 milliGRAM(s) Oral daily  atorvastatin 80 milliGRAM(s) Oral at bedtime  chlorhexidine 2% Cloths 1 Application(s) Topical <User Schedule>  enoxaparin Injectable 30 milliGRAM(s) SubCutaneous every 24 hours  folic acid 5 milliGRAM(s) Oral daily  influenza   Vaccine 0.5 milliLiter(s) IntraMuscular once  iron sucrose IVPB 200 milliGRAM(s) IV Intermittent <User Schedule>  levothyroxine 50 MICROGram(s) Oral daily  metoprolol succinate ER 50 milliGRAM(s) Oral daily  pantoprazole    Tablet 40 milliGRAM(s) Oral before breakfast  polyethylene glycol 3350 17 Gram(s) Oral every 12 hours  senna 2 Tablet(s) Oral at bedtime    MEDICATIONS  (PRN):  acetaminophen     Tablet .. 650 milliGRAM(s) Oral every 6 hours PRN Temp greater or equal to 38C (100.4F), Mild Pain (1 - 3)  melatonin 3 milliGRAM(s) Oral at bedtime PRN Insomnia    Allergies    penicillin (Unknown)    Intolerances      HEALTH ISSUES - PROBLEM Dx:          EVER:  POHx:  FOHx: Non-contributory    Extended HPI:    Ocular Medications: none        EXTERNAL:    VISUAL ACUITY:       RIGHT EYE: sc/cc                            LEFT EYE: sc/cc     MANIFEST:    RIGHT EYE:                LEFT EYE:    NEURO TESTING  EXTRAOCULAR MOVEMENTS:  PUPILS:  VFc:    ANTERIOR SEGMENT EVALUATION: :    LIDS/ADENEXA:  CONJUNCTIVA/SCLERA:  CORNEA:  ANTERIOR CHAMBER:   IRIS/PUPIL:  LENS/VITREOUS:    INTRAOCULAR PRESSURE:   OD: mmHg  OS: mmHg  @    POSTERIOR SEGMENT EVALUATION  DFE: 1% Tropicamide, 2.5 % Phenylephrine OU    OPTIC NERVE:  MACULA:  A/V:   FUNDUS/PERIPHERY:    SUPPLEMENTAL TESTING:             TANISHA HOPKINS  MRN-[id number]  63y Female        Patient is a 63y old  Female who presents with a chief complaint of Anemia (22 Oct 2024 14:14)    HEALTH ISSUES - R/O PROBLEM Dx:    HPI:  Patient is a 63yro female with PMHx of HTN, HLD, hypothyroidism, hx of lymphoma (in remission), and cardiac myxoma with resection (approx 2020) presenting with generalized weakness, dizziness, lightheadedness, and bilateral LE swelling. These symptoms have been ongoing for the past 2 years now, with episodes of syncope (last episode 3 mos ago), but the symptoms were worsening in the last two weeks along with a new R sided vision blurriness, intermittent headaches, and polydipsia. Today she went for a follow up with her cardiologist (Dr. Portillo), where it was found that her hgb was 7.5 and tachycardic, so she was sent to the ED. Patient denies any melena, hematochezia, N/V, or abdominal pain.   Of note, patient have been following with her hematologist/oncologist Dr. Ochoa for anemia workup in the past 2 years, including bone marrow biopsy, MRI, EGD/colonoscopy, and everything has been negative so far.  Patient denies any SOB, chest pain, bowel changes, cough, recent travel, recent illnesses.     In the ED, patient was initially febrile (Tmax 101.9), /65, , Sat 96%, RR 18  Patient was given 1x aztreonam and vancomycin, 1L LR, and tylenol, with the resolution of her fever.  Patient admitted for management of anemia.     Labs significant for:                 6.6    4.96  )-----------( 147      ( 10-17-24 @ 21:50 )              22.3     131  |  99  |  10  -------------------------<  115   10-17-24 @ 21:50  4.1  |  25  |  0.7    Ca      7.3     10-17-24 @ 21:50  Mg     1.9     10-17-24 @ 21:50    TPro  7.2  /  Alb  2.1  /  TBili  0.5  /  DBili  x   /  AST  29  /  ALT  <5  /  AlkPhos  112  /  GGT  x     10-17-24 @ 21:50    ProBNP 481  Trop 10  Lactate 2.5    UA -ve    RVP -ve     Imaging:  CXR: clear on wet read    EKG: Sinus tachycardia    Vitals  T(C): 36.9 (10-18-24 @ 00:15), Max: 38.8 (10-17-24 @ 22:08)  T(F): 98.5 (10-18-24 @ 00:15), Max: 101.9 (10-17-24 @ 22:08)  HR: 85 (10-18-24 @ 00:15) (85 - 113)  BP: 100/65 (10-18-24 @ 00:15) (100/65 - 116/68)  RR: 18 (10-18-24 @ 00:15) (18 - 20)  SpO2: 100% (10-18-24 @ 00:15) (95% - 100%)  Wt(kg): --    Review of Systems  CONSTITUTIONAL:  Weakness, no fevers or chills  EYES/ENT:  R side blurry vision;  No vertigo or throat pain   NECK:  No pain or stiffness  RESPIRATORY:  No cough, wheezing, hemoptysis; No shortness of breath  CARDIOVASCULAR:  No chest pain or palpitations  GASTROINTESTINAL:  No abdominal or epigastric pain. No nausea, vomiting, or hematemesis; No diarrhea, + constipation. No melena or hematochezia.  GENITOURINARY:  No dysuria, frequency or hematuria  MUSCULOSKELETAL: No muscle pains or joint pains  NEUROLOGICAL:  No numbness, + weakness, lightheadedness, dizziness, headaches  SKIN:  No itching, rashes      Physical Exam  GENERAL: AAOx3. Well-nourished, laying in bed appearing pale and weak  HEENT: atraumatic, normocephalic, moist mucus membranes, pale mucus membranes   LUNGS: Clear to auscultation bilaterally  HEART: S1/S2. No heaves or thrills  ABD: Soft, non-tender, non-distended. tender in the paraumbilic region  EXT/NEURO: Strength, sensation, ROM grossly intact but weak, B/L 2+ pitting edema  SKIN: No breaks, erythema, edema   (18 Oct 2024 02:59)    PAST MEDICAL & SURGICAL HISTORY:  Hypertension      Hyperlipemia      Hypothyroidism      History of lymphoma      Cardiac myxoma        MEDICATIONS  (STANDING):  aspirin enteric coated 81 milliGRAM(s) Oral daily  atorvastatin 80 milliGRAM(s) Oral at bedtime  chlorhexidine 2% Cloths 1 Application(s) Topical <User Schedule>  enoxaparin Injectable 30 milliGRAM(s) SubCutaneous every 24 hours  folic acid 5 milliGRAM(s) Oral daily  influenza   Vaccine 0.5 milliLiter(s) IntraMuscular once  iron sucrose IVPB 200 milliGRAM(s) IV Intermittent <User Schedule>  levothyroxine 50 MICROGram(s) Oral daily  metoprolol succinate ER 50 milliGRAM(s) Oral daily  pantoprazole    Tablet 40 milliGRAM(s) Oral before breakfast  polyethylene glycol 3350 17 Gram(s) Oral every 12 hours  senna 2 Tablet(s) Oral at bedtime    MEDICATIONS  (PRN):  acetaminophen     Tablet .. 650 milliGRAM(s) Oral every 6 hours PRN Temp greater or equal to 38C (100.4F), Mild Pain (1 - 3)  melatonin 3 milliGRAM(s) Oral at bedtime PRN Insomnia    Allergies    penicillin (Unknown)    Intolerances      HEALTH ISSUES - PROBLEM Dx:          EVER: 1month at Optical in [facility]  POHx: h/o CE OU ~ 10yrs ago (Dr. Emanuel in [facility])  FOHx: Non-contributory    Extended HPI: pt gives h/o decreased and blurry vision, painless, of  the right eye for about 1 month. Denies any flashing lights or floating spots to either eye.   is by pt'side and assited with the history. Pt states had eye exam at an optical in [facility]. Was Rx'd new Spec Rx (progressives) and was referred back to Dr. Emanuel (who she states she has not seen for some time)    Ocular Medications: none    Bedside Exam     EXTERNAL:    VISUAL ACUITY:       RIGHT EYE: sc: FC @ 1 ft temp                         LEFT EYE: cc: 20/30- ph -  NI      NEURO TESTING  EXTRAOCULAR MOVEMENTS: full OU  PUPILS: OD - 5mm, OS - 3mm; + RAPD  VFc: OD: decreased ST; OS - full OU (overall difficult to assess due to pt fixation)    ANTERIOR SEGMENT EVALUATION: :    LIDS/ADENEXA: clear OU  CONJUNCTIVA/SCLERA: clear OU  CORNEA: clear OU  ANTERIOR CHAMBER: deep OU  IRIS/PUPIL: flat/round OU  LENS/VITREOUS: PC IOL OU    INTRAOCULAR PRESSURE:   OD: 10mmHg  OS: 12 mmHg  @ 3:25pm    POSTERIOR SEGMENT EVALUATION  DFE: 1% Tropicamide, 2.5 % Phenylephrine OU    OPTIC NERVE:  MACULA:  A/V:   FUNDUS/PERIPHERY:    SUPPLEMENTAL TESTING:             TANISHA HOPKINS  MRN-[id number]  63y Female        Patient is a 63y old  Female who presents with a chief complaint of Anemia (22 Oct 2024 14:14)    HEALTH ISSUES - R/O PROBLEM Dx:    HPI:  Patient is a 63yro female with PMHx of HTN, HLD, hypothyroidism, hx of lymphoma (in remission), and cardiac myxoma with resection (approx 2020) presenting with generalized weakness, dizziness, lightheadedness, and bilateral LE swelling. These symptoms have been ongoing for the past 2 years now, with episodes of syncope (last episode 3 mos ago), but the symptoms were worsening in the last two weeks along with a new R sided vision blurriness, intermittent headaches, and polydipsia. Today she went for a follow up with her cardiologist (Dr. Portillo), where it was found that her hgb was 7.5 and tachycardic, so she was sent to the ED. Patient denies any melena, hematochezia, N/V, or abdominal pain.   Of note, patient have been following with her hematologist/oncologist Dr. Ochoa for anemia workup in the past 2 years, including bone marrow biopsy, MRI, EGD/colonoscopy, and everything has been negative so far.  Patient denies any SOB, chest pain, bowel changes, cough, recent travel, recent illnesses.     In the ED, patient was initially febrile (Tmax 101.9), /65, , Sat 96%, RR 18  Patient was given 1x aztreonam and vancomycin, 1L LR, and tylenol, with the resolution of her fever.  Patient admitted for management of anemia.     Labs significant for:                 6.6    4.96  )-----------( 147      ( 10-17-24 @ 21:50 )              22.3     131  |  99  |  10  -------------------------<  115   10-17-24 @ 21:50  4.1  |  25  |  0.7    Ca      7.3     10-17-24 @ 21:50  Mg     1.9     10-17-24 @ 21:50    TPro  7.2  /  Alb  2.1  /  TBili  0.5  /  DBili  x   /  AST  29  /  ALT  <5  /  AlkPhos  112  /  GGT  x     10-17-24 @ 21:50    ProBNP 481  Trop 10  Lactate 2.5    UA -ve    RVP -ve     Imaging:  CXR: clear on wet read    EKG: Sinus tachycardia    Vitals  T(C): 36.9 (10-18-24 @ 00:15), Max: 38.8 (10-17-24 @ 22:08)  T(F): 98.5 (10-18-24 @ 00:15), Max: 101.9 (10-17-24 @ 22:08)  HR: 85 (10-18-24 @ 00:15) (85 - 113)  BP: 100/65 (10-18-24 @ 00:15) (100/65 - 116/68)  RR: 18 (10-18-24 @ 00:15) (18 - 20)  SpO2: 100% (10-18-24 @ 00:15) (95% - 100%)  Wt(kg): --    Review of Systems  CONSTITUTIONAL:  Weakness, no fevers or chills  EYES/ENT:  R side blurry vision;  No vertigo or throat pain   NECK:  No pain or stiffness  RESPIRATORY:  No cough, wheezing, hemoptysis; No shortness of breath  CARDIOVASCULAR:  No chest pain or palpitations  GASTROINTESTINAL:  No abdominal or epigastric pain. No nausea, vomiting, or hematemesis; No diarrhea, + constipation. No melena or hematochezia.  GENITOURINARY:  No dysuria, frequency or hematuria  MUSCULOSKELETAL: No muscle pains or joint pains  NEUROLOGICAL:  No numbness, + weakness, lightheadedness, dizziness, headaches  SKIN:  No itching, rashes      Physical Exam  GENERAL: AAOx3. Well-nourished, laying in bed appearing pale and weak  HEENT: atraumatic, normocephalic, moist mucus membranes, pale mucus membranes   LUNGS: Clear to auscultation bilaterally  HEART: S1/S2. No heaves or thrills  ABD: Soft, non-tender, non-distended. tender in the paraumbilic region  EXT/NEURO: Strength, sensation, ROM grossly intact but weak, B/L 2+ pitting edema  SKIN: No breaks, erythema, edema   (18 Oct 2024 02:59)    PAST MEDICAL & SURGICAL HISTORY:  Hypertension      Hyperlipemia      Hypothyroidism      History of lymphoma      Cardiac myxoma        MEDICATIONS  (STANDING):  aspirin enteric coated 81 milliGRAM(s) Oral daily  atorvastatin 80 milliGRAM(s) Oral at bedtime  chlorhexidine 2% Cloths 1 Application(s) Topical <User Schedule>  enoxaparin Injectable 30 milliGRAM(s) SubCutaneous every 24 hours  folic acid 5 milliGRAM(s) Oral daily  influenza   Vaccine 0.5 milliLiter(s) IntraMuscular once  iron sucrose IVPB 200 milliGRAM(s) IV Intermittent <User Schedule>  levothyroxine 50 MICROGram(s) Oral daily  metoprolol succinate ER 50 milliGRAM(s) Oral daily  pantoprazole    Tablet 40 milliGRAM(s) Oral before breakfast  polyethylene glycol 3350 17 Gram(s) Oral every 12 hours  senna 2 Tablet(s) Oral at bedtime    MEDICATIONS  (PRN):  acetaminophen     Tablet .. 650 milliGRAM(s) Oral every 6 hours PRN Temp greater or equal to 38C (100.4F), Mild Pain (1 - 3)  melatonin 3 milliGRAM(s) Oral at bedtime PRN Insomnia    Allergies    penicillin (Unknown)    Intolerances      HEALTH ISSUES - PROBLEM Dx:          EVER: 1month at Optical in [facility]  POHx: h/o CE OU ~ 10yrs ago (Dr. Emanuel in [facility])  FOHx: Non-contributory    Extended HPI: pt gives h/o decreased and blurry vision, painless, of  the right eye for about 1 month. Denies any flashing lights or floating spots to either eye.   is by pt'side and assited with the history. Pt states had eye exam at an optical in [facility]. Was Rx'd new Spec Rx (progressives) and was referred back to Dr. Emanuel (who she states she has not seen for some time)    Ocular Medications: none    Bedside Exam     EXTERNAL:    VISUAL ACUITY:       RIGHT EYE: sc: FC @ 1 ft temp                         LEFT EYE: cc: 20/30- ph -  NI      NEURO TESTING  EXTRAOCULAR MOVEMENTS: full OU  PUPILS: OD - 5mm, OS - 3mm; + RAPD  VFc: OD: decreased ST; OS - full OU (overall difficult to assess due to pt fixation)    ANTERIOR SEGMENT EVALUATION: :    LIDS/ADENEXA: clear OU  CONJUNCTIVA/SCLERA: clear OU  CORNEA: clear OU  ANTERIOR CHAMBER: deep OU  IRIS/PUPIL: flat/round OU  LENS/VITREOUS: PC IOL OU    INTRAOCULAR PRESSURE:   OD: 10mmHg  OS: 12 mmHg  @ 3:25pm    POSTERIOR SEGMENT EVALUATION  DFE: 1% Tropicamide, 2.5 % Phenylephrine OU    OPTIC NERVE: 0.5/0.5  MACULA: OD   A/V:   FUNDUS/PERIPHERY: area temporal suspicious  for choroidal detachment and SFF extending to the macula + subretinal fliud nasal and inferior; + retiinal and subretinal hems and exudate near the macula; OS few posterior pole hemes. + peripheral hemes temporal some with white centers.    SUPPLEMENTAL TESTING:             TANISHA HOPKINS  MRN-[id number]  63y Female        Patient is a 63y old  Female who presents with a chief complaint of Anemia (22 Oct 2024 14:14)    HEALTH ISSUES - R/O PROBLEM Dx:    HPI:  Patient is a 63yro female with PMHx of HTN, HLD, hypothyroidism, hx of lymphoma (in remission), and cardiac myxoma with resection (approx 2020) presenting with generalized weakness, dizziness, lightheadedness, and bilateral LE swelling. These symptoms have been ongoing for the past 2 years now, with episodes of syncope (last episode 3 mos ago), but the symptoms were worsening in the last two weeks along with a new R sided vision blurriness, intermittent headaches, and polydipsia. Today she went for a follow up with her cardiologist (Dr. Portillo), where it was found that her hgb was 7.5 and tachycardic, so she was sent to the ED. Patient denies any melena, hematochezia, N/V, or abdominal pain.   Of note, patient have been following with her hematologist/oncologist Dr. Ochoa for anemia workup in the past 2 years, including bone marrow biopsy, MRI, EGD/colonoscopy, and everything has been negative so far.  Patient denies any SOB, chest pain, bowel changes, cough, recent travel, recent illnesses.     In the ED, patient was initially febrile (Tmax 101.9), /65, , Sat 96%, RR 18  Patient was given 1x aztreonam and vancomycin, 1L LR, and tylenol, with the resolution of her fever.  Patient admitted for management of anemia.     Labs significant for:                 6.6    4.96  )-----------( 147      ( 10-17-24 @ 21:50 )              22.3     131  |  99  |  10  -------------------------<  115   10-17-24 @ 21:50  4.1  |  25  |  0.7    Ca      7.3     10-17-24 @ 21:50  Mg     1.9     10-17-24 @ 21:50    TPro  7.2  /  Alb  2.1  /  TBili  0.5  /  DBili  x   /  AST  29  /  ALT  <5  /  AlkPhos  112  /  GGT  x     10-17-24 @ 21:50    ProBNP 481  Trop 10  Lactate 2.5    UA -ve    RVP -ve     Imaging:  CXR: clear on wet read    EKG: Sinus tachycardia    Vitals  T(C): 36.9 (10-18-24 @ 00:15), Max: 38.8 (10-17-24 @ 22:08)  T(F): 98.5 (10-18-24 @ 00:15), Max: 101.9 (10-17-24 @ 22:08)  HR: 85 (10-18-24 @ 00:15) (85 - 113)  BP: 100/65 (10-18-24 @ 00:15) (100/65 - 116/68)  RR: 18 (10-18-24 @ 00:15) (18 - 20)  SpO2: 100% (10-18-24 @ 00:15) (95% - 100%)  Wt(kg): --    Review of Systems  CONSTITUTIONAL:  Weakness, no fevers or chills  EYES/ENT:  R side blurry vision;  No vertigo or throat pain   NECK:  No pain or stiffness  RESPIRATORY:  No cough, wheezing, hemoptysis; No shortness of breath  CARDIOVASCULAR:  No chest pain or palpitations  GASTROINTESTINAL:  No abdominal or epigastric pain. No nausea, vomiting, or hematemesis; No diarrhea, + constipation. No melena or hematochezia.  GENITOURINARY:  No dysuria, frequency or hematuria  MUSCULOSKELETAL: No muscle pains or joint pains  NEUROLOGICAL:  No numbness, + weakness, lightheadedness, dizziness, headaches  SKIN:  No itching, rashes      Physical Exam  GENERAL: AAOx3. Well-nourished, laying in bed appearing pale and weak  HEENT: atraumatic, normocephalic, moist mucus membranes, pale mucus membranes   LUNGS: Clear to auscultation bilaterally  HEART: S1/S2. No heaves or thrills  ABD: Soft, non-tender, non-distended. tender in the paraumbilic region  EXT/NEURO: Strength, sensation, ROM grossly intact but weak, B/L 2+ pitting edema  SKIN: No breaks, erythema, edema   (18 Oct 2024 02:59)    PAST MEDICAL & SURGICAL HISTORY:  Hypertension      Hyperlipemia      Hypothyroidism      History of lymphoma      Cardiac myxoma        MEDICATIONS  (STANDING):  aspirin enteric coated 81 milliGRAM(s) Oral daily  atorvastatin 80 milliGRAM(s) Oral at bedtime  chlorhexidine 2% Cloths 1 Application(s) Topical <User Schedule>  enoxaparin Injectable 30 milliGRAM(s) SubCutaneous every 24 hours  folic acid 5 milliGRAM(s) Oral daily  influenza   Vaccine 0.5 milliLiter(s) IntraMuscular once  iron sucrose IVPB 200 milliGRAM(s) IV Intermittent <User Schedule>  levothyroxine 50 MICROGram(s) Oral daily  metoprolol succinate ER 50 milliGRAM(s) Oral daily  pantoprazole    Tablet 40 milliGRAM(s) Oral before breakfast  polyethylene glycol 3350 17 Gram(s) Oral every 12 hours  senna 2 Tablet(s) Oral at bedtime    MEDICATIONS  (PRN):  acetaminophen     Tablet .. 650 milliGRAM(s) Oral every 6 hours PRN Temp greater or equal to 38C (100.4F), Mild Pain (1 - 3)  melatonin 3 milliGRAM(s) Oral at bedtime PRN Insomnia    Allergies    penicillin (Unknown)    Intolerances      HEALTH ISSUES - PROBLEM Dx:          EVER: 1month at Optical in [facility]  POHx: h/o CE OU ~ 10yrs ago (Dr. Emanuel in [facility])  FOHx: Non-contributory    Extended HPI: pt gives h/o decreased and blurry vision, painless, of  the right eye for about 1 month. Denies any flashing lights or floating spots to either eye.   is by pt'side and assited with the history. Pt states had eye exam at an optical in [facility]. Was Rx'd new Spec Rx (progressives) and was referred back to Dr. Emanuel (who she states she has not seen for some time)    Ocular Medications: none    Bedside Exam     EXTERNAL:    VISUAL ACUITY:       RIGHT EYE: sc: FC @ 1 ft temp                         LEFT EYE: cc: 20/30- ph -  NI      NEURO TESTING  EXTRAOCULAR MOVEMENTS: full OU  PUPILS: OD - 5mm, OS - 3mm; + RAPD  VFc: OD: decreased ST; OS - full OU (overall difficult to assess due to pt fixation)    ANTERIOR SEGMENT EVALUATION: :    LIDS/ADENEXA: clear OU  CONJUNCTIVA/SCLERA: clear OU  CORNEA: clear OU  ANTERIOR CHAMBER: deep OU  IRIS/PUPIL: flat/round OU  LENS/VITREOUS: PC IOL OU    INTRAOCULAR PRESSURE:   OD: 10mmHg  OS: 12 mmHg  @ 3:25pm    POSTERIOR SEGMENT EVALUATION  DFE: 1% Tropicamide, 2.5 % Phenylephrine OU    OPTIC NERVE: 0.5/0.5    Fundus: OD: large  areas of subretinal fluid (especially temporally) extending to the macula + subretinal fluid nasal and inferior; + scattered area of large  subretinal hems and exudate near the macula; with thickening of the macula.   OS:  few posterior pole hemes. +  many peripheral hemes temporal some with white centers.

## 2024-10-22 NOTE — DISCHARGE NOTE PROVIDER - [id number]_GEN_ALL_CORE_FT
Marion Ballesteros  [facility] Physician [facility]  NEUROLOGY [address] [facility]  Scheduled Appointment: 11/19/2024

## 2024-10-22 NOTE — DISCHARGE NOTE PROVIDER - CARE PROVIDER_API CALL
Regan Nieves  Neurology  [address] [place] [address], [address] [address]  Phone: [phone]  Fax: [phone]  Follow Up Time:    Regan Nieves  Neurology  [address] [facility], [address] [address]  Phone: [phone]  Fax: [phone]  Follow Up Time:     Mega Valderrama  Pulmonary Disease  [address] [place] [address]  Phone: [phone]  Fax: [phone]  Follow Up Time: Routine    Emilie Andres  Medical Oncology  [facility] [place] [address]  Phone: [phone]  Fax: [phone]  Follow Up Time: 1 month    Elise Robb  Geriatric Medicine  [address] [place] [address]  Phone: [phone]  Fax: [phone]  Follow Up Time: 1 month    Ramandeep Ruff  Cardiac Electrophysiology  [address] [place] [address]  Phone: [phone]  Fax: [phone]  Follow Up Time: 1 month   Regan Nieves  Neurology  [address] [facility], [address] [address]  Phone: [phone]  Fax: [phone]  Follow Up Time: 1 month    Mega Valderrama  Pulmonary Disease  [address] [place] [address]  Phone: [phone]  Fax: [phone]  Follow Up Time: Routine    Elise Robb  Geriatric Medicine  [address] [place] [address]  Phone: [phone]  Fax: [phone]  Follow Up Time: 1 month    Ramandeep Ruff  Cardiac Electrophysiology  [address] [place] [address]  Phone: [phone]  Fax: [phone]  Follow Up Time: 1 month    Key Martinez  Hematology  [facility] [place] [address]  Phone: [phone]  Fax: [phone]  Follow Up Time: 1 week

## 2024-10-22 NOTE — PROGRESS NOTE ADULT - ASSESSMENT
This is a 63yro female with PMHx of HTN, HLD, hypothyroidism, hx of lymphoma (in remission), and cardiac myxoma with resection (approx 2020) who presents for anemia, generalized weakness, unilateral R sided vision loss. subacute subtle cognitive changes.  MRI w/ ?subacute infarct R cerebellum w/ petechial hemorrhage Multiple nonenhancing lesions bilateral periventricular white matters, splenium of the corpus callosum, and right sabra w/ restricted diffusion in associated with the corpus callosal and right pontine lesions. Etiology of lesions unclear. Leading consideration for T2 hyperintensity in the splenium, white matter, and sabra include demyelinating/autoimmune vs neoplastic/paraneoplastic etiology vs vasculitis. Would also consider, less likely vascular. Cerebellar lesion appears neurovascular in etiology. Labwork and CSF concerning for inflammatory process as well as leukopenia and anemia.       Recommendations:  - Obtain VILMA - given old cardiac myxoma may contribute to cerebellar lesion  - Obtain FDG-PET Brain and Body - may eventually need brain bx but would need to eval for any non-CNS bx sites first  - Consult SHANNON for DSA - eval for vasculitis  - Obtain collateral from heme/onc re. prior lymphoma history   - F/u LP studies - please send NMO (aquaporin-4) and add on paraneoplastic panel serum and CSF to current labs.  - Send Thiamine (B1) level, copper/ceruloplasmin, MMA/homocysteine.   - Ophtho eval  - MRI C and T spine w/ and w/o to ensure no other lesions  - C/w ASA 81mg po qd (if cleared from anemia standpoint), and Atorvastatin 80mg po qd    INCOMPLETE NOTE This is a 63yro female with PMHx of HTN, HLD, hypothyroidism, hx of lymphoma (in remission), and cardiac myxoma with resection (approx 2020) who admitted for anemia. Found to have generalized weakness, unilateral R sided vision loss. subacute subtle cognitive changes.  MRI w/ ?subacute infarct R cerebellum w/ petechial hemorrhage Multiple nonenhancing lesions bilateral periventricular white matters, splenium of the corpus callosum, and right sabra w/ restricted diffusion in associated with the corpus callosal and right pontine lesions. Etiology of lesions unclear. Leading consideration for T2 hyperintensity in the splenium, white matter, and sabra include demyelinating/autoimmune vs neoplastic/paraneoplastic etiology vs vasculitis. Would also consider, less likely vascular. Cerebellar lesion appears neurovascular in etiology.      Recommendations:  - Obtain VILMA - given old cardiac myxoma may contribute to cerebellar lesion  - Obtain FDG-PET brain and body  - Obtain collateral from heme/onc re. prior lymphoma history   - F/u LP studies - please send NMO (aquaporin-4) and add on paraneoplastic panel serum and CSF to current labs.  - Send Thiamine (B1) level, copper/ceruloplasmin, MMA/homocysteine.   - Ophtho eval  - MRI C and T spine w/ and w/o to ensure no other lesions  - C/w ASA 81mg po qd (if cleared from anemia standpoint), and Atorvastatin 80mg po qd    Discussed with neurology attending.  This is a 63yro female with PMHx of HTN, HLD, hypothyroidism, hx of lymphoma (in remission), and cardiac myxoma with resection (approx 2020) who admitted for anemia. Found to have generalized weakness, unilateral R sided vision loss. subacute subtle cognitive changes.  MRI w/ ?subacute infarct R cerebellum w/ petechial hemorrhage Multiple nonenhancing lesions bilateral periventricular white matters, splenium of the corpus callosum, and right sabra w/ restricted diffusion in associated with the corpus callosal and right pontine lesions. Etiology of lesions unclear. Leading consideration for T2 hyperintensity in the splenium, white matter, and sabra include demyelinating/autoimmune vs neoplastic/paraneoplastic etiology vs vasculitis. Would also consider, less likely vascular. Cerebellar lesion appears neurovascular in etiology.       Recommendations:  - Obtain VILMA - given old cardiac myxoma may contribute to cerebellar lesion  - Obtain FDG-PET brain and body  - Obtain collateral from heme/onc re. prior lymphoma history   - F/u LP studies - please send NMO (aquaporin-4) and add on paraneoplastic panel serum and CSF to current labs.  - Send Thiamine (B1) level, copper/ceruloplasmin, MMA/homocysteine.   - Ophtho eval  - MRI C and T spine w/ and w/o to ensure no other lesions  - C/w ASA 81mg po qd (if cleared from anemia standpoint), and Atorvastatin 80mg po qd    Discussed with neurology attending.  This is a 63yro female with PMHx of HTN, HLD, hypothyroidism, hx of lymphoma (in remission), and cardiac myxoma with resection (approx 2020) who admitted for anemia. Found to have generalized weakness, unilateral R sided vision loss. subacute subtle cognitive changes.  MRI w/ ?subacute infarct R cerebellum w/ petechial hemorrhage Multiple nonenhancing lesions bilateral periventricular white matters, splenium of the corpus callosum, and right sabra w/ restricted diffusion in associated with the corpus callosal and right pontine lesions. Etiology of lesions unclear. Leading consideration for T2 hyperintensity in the splenium, white matter, and sabra include demyelinating/autoimmune vs neoplastic/paraneoplastic etiology vs vasculitis. Would also consider, less likely vascular. Cerebellar lesion appears neurovascular in etiology.       Recommendations:  - Obtain VILMA - given old cardiac myxoma may contribute to cerebellar lesion  - Can consider FDG-PET brain and body to evaluate for possible neoplasm/lymphoma  - Obtain collateral from heme/onc re. prior lymphoma history   - F/u LP studies - please send NMO (aquaporin-4) and add on paraneoplastic panel serum and CSF to current labs.  - Send Thiamine (B1) level, copper/ceruloplasmin, MMA/homocysteine.   - Ophtho eval  - MRI C and T spine w/ and w/o to ensure no other lesions  - C/w ASA 81mg po qd (if cleared from anemia standpoint), and Atorvastatin 80mg po qd    Discussed with neurology attending.

## 2024-10-22 NOTE — DISCHARGE NOTE PROVIDER - NSDCCPCAREPLAN_GEN_ALL_CORE_FT
PRINCIPAL DISCHARGE DIAGNOSIS  Diagnosis: Symptomatic anemia  Assessment and Plan of Treatment: You came in for weakness associated with anemia. Your hemoglobin was found to be low so you were given a blood transfusion. A CT of your head showed hyperdensities within the right basal ganglia and scattered hypodensities within the splenium of the corpus callosum, right inferior cerebellar hemisphere as well as the bilateral temporal periventricular white matter. An MRI brain was performed to further investigate these findings. It showed patchy lesion in the inferior right cerebellum with petechial hemorrhage. Multiple additional nonenhancing lesions throughout the bilateral periventricular white matters, splenium of the corpus callosum, and right sabra. Neurology was recommended further studies to assess for possible demelynating diseases.   A lumbar puncture was performed and cerebral spinal fluid samples were sent for. The CSF samples were negative for viral or bacterial infection.   Please follow up with Neurology for the results of these studies.   Do you need a primary care doctor or follow-up with a specialist? Our care coordinators will help you find providers near you and schedule any follow-up care visits.  Monday-Friday: 9am-5pm  Call our Care[facility] team: [phone]      SECONDARY DISCHARGE DIAGNOSES  Diagnosis: Symptomatic anemia  Assessment and Plan of Treatment:      [Hypertension] : hypertension PRINCIPAL DISCHARGE DIAGNOSIS  Diagnosis: Symptomatic anemia  Assessment and Plan of Treatment: You came in for weakness associated with anemia. Your hemoglobin was found to be low so you were given a blood transfusion. A CT of your head showed hyperdensities within the right basal ganglia and scattered hypodensities within the splenium of the corpus callosum, right inferior cerebellar hemisphere as well as the bilateral temporal periventricular white matter. An MRI brain was performed to further investigate these findings. It showed patchy lesion in the inferior right cerebellum with petechial hemorrhage. Multiple additional nonenhancing lesions throughout the bilateral periventricular white matters, splenium of the corpus callosum, and right sabra. Neurology was recommended further studies to assess for possible demelynating diseases.   please follow up with dr STEFFANIE DANG for PET CT.   please follow up with  [facility]  Do you need a primary care doctor or follow-up with a specialist? Our care coordinators will help you find providers near you and schedule any follow-up care visits.  Monday-Friday: 9am-5pm  Call our CareBridge team: [phone]      SECONDARY DISCHARGE DIAGNOSES  Diagnosis: Symptomatic anemia  Assessment and Plan of Treatment:      PRINCIPAL DISCHARGE DIAGNOSIS  Diagnosis: Symptomatic anemia  Assessment and Plan of Treatment: You came in for weakness associated with anemia. Your hemoglobin was found to be low so you were given a blood transfusion. A CT of your head showed hyperdensities within the right basal ganglia and scattered hypodensities within the splenium of the corpus callosum, right inferior cerebellar hemisphere as well as the bilateral temporal periventricular white matter. An MRI brain was performed to further investigate these findings. It showed patchy lesion in the inferior right cerebellum with petechial hemorrhage. Multiple additional nonenhancing lesions throughout the bilateral periventricular white matters, splenium of the corpus callosum, and right sabra. Neurology was recommended further studies to assess for possible demelynating diseases.   please follow up with dr STEFFANIE DANG for PET CT to rule out hepatospleenomegally given your history of lymphoma.   please follow up with  [facility]  Do you need a primary care doctor or follow-up with a specialist? Our care coordinators will help you find providers near you and schedule any follow-up care visits.  Monday-Friday: 9am-5pm  [facility]: [phone] [facility]

## 2024-10-22 NOTE — CONSULT NOTE ADULT - ASSESSMENT
Assessment  - Extensive areas of subretinal fluid and subretinal hemorrhages with para macular exudate in the right eye  - Retinal hemorrhages in the left eye mainly peripheral and temporal  The thickening of the macula with subretinal heme and exudate of the right eye  may be suggestive of neovascular AMD process  with her c/o reduced vision in the right eye of 1-2 months duration  Plan  - Given the extensive subretinal fluid and subretinal hemes in the right eye and the retinal hemorrhages in the left eye would suggest   minimizing anticoagulation during the patients hospital course.   Also, given her retinal findings of hemorrhages  and platelets trending downwards, would consider a hematology consult.    Pt examined along with Dr. Aries Nelson MD     Recommend an outpatient follow up with retinal specialist, Dr. Aries Nelson MD  [phone]  [address] [place] [address], [address] [place], [place] [id number] Assessment  - Extensive areas of subretinal fluid and subretinal hemorrhages with para macular exudate in the right eye  - Retinal hemorrhages in the left eye mainly peripheral and temporal  The thickening of the macula with subretinal heme and exudate of the right eye  may be suggestive of neovascular AMD process  with her c/o reduced vision in the right eye of 1-2 months duration  Plan  - Given the extensive subretinal fluid and subretinal hemes in the right eye and the retinal hemorrhages anticoagulation should be minimized during the patients hospital course.   Also, given her extensive retinal hemorrhages and low platelets, should consider a hematology consult.     Pt examined along with Dr. Aries Nelson MD     Recommend an outpatient follow up with retinal specialist, Dr. Aries Nelson MD  [phone]  [address] [place] [address], [address]  [place] [id number]

## 2024-10-22 NOTE — DISCHARGE NOTE PROVIDER - PROVIDER TOKENS
PROVIDER:[TOKEN:[[id number]]] PROVIDER:[TOKEN:[[id number]]],PROVIDER:[TOKEN:[[id number]],FOLLOWUP:[Routine]],PROVIDER:[TOKEN:[[id number]],FOLLOWUP:[1 month]],PROVIDER:[TOKEN:[[id number]],FOLLOWUP:[1 month]],PROVIDER:[TOKEN:[[id number]],FOLLOWUP:[1 month]] PROVIDER:[TOKEN:[[id number]],FOLLOWUP:[1 month]],PROVIDER:[TOKEN:[[id number]],FOLLOWUP:[Routine]],PROVIDER:[TOKEN:[[id number]],FOLLOWUP:[1 month]],PROVIDER:[TOKEN:[[id number]],FOLLOWUP:[1 month]],PROVIDER:[TOKEN:[[id number]],FOLLOWUP:[1 week]]

## 2024-10-22 NOTE — PROGRESS NOTE ADULT - ASSESSMENT
63yro female with PMHx of HTN, HLD, hypothyroidism, hx of lymphoma (in remission), and cardiac myxoma with resection (approx 2020) presenting with fever, generalized weakness, dizziness, lightheadedness.    # Brain MRI showing nonenhancing lesions  # r/o CNS disease, demyelinating disease, paraneoplastic VS others   - MRI Patchy lesion in the inferior right cerebellum with petechial hemorrhage may represent subacute infarct. started ASA and Lipitor per Neuro   - Multiple additional nonenhancing lesions throughout the bilateral periventricular white matters, splenium of the corpus callosum, and right urmila  - LP performed 10/21 and samples sent  - f/u CSF studies Cell count, glucose, protein, LDH, Acid fast culture, fungal cultures, Crypto, EBV, Gram stain, CSF PCR, HSV, Histo, ROBI, Lyme, Borrelia, VDRL, West Nile, ACE (CSF and serum), IgG index, Autoimmune encephelopathy panel (serum and CSF), MOG, Myelin basic protein, Oligoclonal bands, Flow Cytometry, Cytopathology, Paraneoplastic (CSF and serum).  - f/u ESR, CRP, CARLA, Anti ds-DNA, Complement levels C3/C4, RF, ANCA, Sjogrens (SS-A, SS-B), ACE, TSH/T4, SPEP, Lyme/Borrelia screen, Syphilis,   - f/u Anti-smith Ab, Anti-histone Antibodies, Anti-RNP Antibodies, Cryoglobulins   - f/u HIV, EBV PCR, CMV PCR, RPR, Hepatitis panel,     - CT A/P showed several hepatic hypodensities 0.7 cm, splenic hypodensity lesion measuring 2.3 cm   - f/u MRI C and T spine w/ and w/o to ensure no other lesions  - CTA of head and neck negative for pathology    # Acute on Chronic normocytic anemia, symptomatic   - no evidence of bleeding, SHAY neg  - hbg responded to 2 u prbc  - no evidence of hemolysis  - B12 WNL. low folate  - c/w folic acid   - c/w Venofer (today day 3 of 5)  - f/u Dr. Ochoa as hematology/Oncology    # Fever  # Weakness  # R sided vision changes  # Hx of non hodgkin's lymphoma  # Hx of cardiac myxoma s/p resection   - still have intermittent fever, last on 10/21 evening   - RVP negative  - BCx and UA negative  - per ID High suspicion for malignancy as monocytosis, lung findings, splenic/liver/brain findings, elevated ferritin  - Monitor off antimicrobials   - malignancy workup  - Right lower lobe nodular, f/u in 3 mo w Pulmonology   - f/u Ophthalmology consult  - f/u VILMA for workup of subacute strokes and hx of cardiac myxoma    # Hypothyroid  - c/w synthroid  - f/u TSH and T4    # HTN   # HLD  - c/w metoprolol and atorvastatin      # DVT prophylaxis: Lovenox  # GI prophylaxis: PPI  # Diet: Regular  # Activity: as tolerated  # Code status: Full Code  # Disposition: acute    Pending: LP labs, MR C spine and T spine, opthomology eval, VILMA

## 2024-10-22 NOTE — PROGRESS NOTE ADULT - SUBJECTIVE AND OBJECTIVE BOX
Neurology Progress Note    Interval History:  The patient was seen and examined at the bedside. No complaints except would like to be discharged. Discussion with pt and  at bedside.       PAST MEDICAL & SURGICAL HISTORY:  Hypertension    Hyperlipemia    Hypothyroidism    History of lymphoma    Cardiac myxoma            Medications:  acetaminophen     Tablet .. 650 milliGRAM(s) Oral every 6 hours PRN  aspirin enteric coated 81 milliGRAM(s) Oral daily  atorvastatin 80 milliGRAM(s) Oral at bedtime  chlorhexidine 2% Cloths 1 Application(s) Topical <User Schedule>  enoxaparin Injectable 30 milliGRAM(s) SubCutaneous every 24 hours  folic acid 5 milliGRAM(s) Oral daily  influenza   Vaccine 0.5 milliLiter(s) IntraMuscular once  iron sucrose IVPB 200 milliGRAM(s) IV Intermittent <User Schedule>  levothyroxine 50 MICROGram(s) Oral daily  melatonin 3 milliGRAM(s) Oral at bedtime PRN  metoprolol succinate ER 50 milliGRAM(s) Oral daily  pantoprazole    Tablet 40 milliGRAM(s) Oral before breakfast  polyethylene glycol 3350 17 Gram(s) Oral every 12 hours  senna 2 Tablet(s) Oral at bedtime      Vital Signs Last 24 Hrs  T(C): 36.8 (22 Oct 2024 12:00), Max: 37.4 (21 Oct 2024 19:47)  T(F): 98.2 (22 Oct 2024 12:00), Max: 99.3 (21 Oct 2024 19:47)  HR: 109 (22 Oct 2024 12:00) (107 - 122)  BP: 98/60 (22 Oct 2024 12:00) (97/63 - 100/67)  BP(mean): --  RR: 16 (22 Oct 2024 12:00) (16 - 20)  SpO2: 96% (22 Oct 2024 12:00) (93% - 96%)    Parameters below as of 22 Oct 2024 05:02  Patient On (Oxygen Delivery Method): room air    Neurologic:  -Mental status: Awake, alert, oriented to person, place, and time. Speech is fluent with intact naming, repetition, and comprehension, no dysarthria. Recent and remote memory intact. Follows simple commands. Some difficulty withcomplex cross commands but can eventually get it. Cannot spell WORLD backwards. Cannot do serial 7's. Cannot do 3 item recall.   -Cranial nerves:   II: Visual fields are full to confrontation. Visual acuities (with correction) OD 20/100. OS 20/30.  III, IV, VI: Extraocular movements are intact without nystagmus. Pupils equally round and reactive to light.   V:  Facial sensation V1-V3 equal and intact   VII: Face is symmetric with normal eye closure and smile  VIII: Hearing is bilaterally intact to finger rub  IX, X: Uvula is midline and soft palate rises symmetrically  XI: Head turning and shoulder shrug are intact.  XII: Tongue protrudes midline  Motor: Normal bulk and tone. No pronator drift. Strength bilateral upper and lower extremity 5/5,  Sensation: Intact to light touch, PP, Temperature, ABHINAV, and vibration b/l.   Coordination: No dysmetria on finger-to-nose bilaterally  Reflexes: Mute toes bilaterally. Reflexes 1+ b/l achilles. 2+ L patellar, BR, BI. 1+ R patellar BR BI.  Gait: deferred    Labs:  CBC Full  -  ( 21 Oct 2024 07:33 )  WBC Count : 4.51 K/uL  RBC Count : 3.51 M/uL  Hemoglobin : 9.1 g/dL  Hematocrit : 29.4 %  Platelet Count - Automated : 107 K/uL  Mean Cell Volume : 83.8 fL  Mean Cell Hemoglobin : 25.9 pg  Mean Cell Hemoglobin Concentration : 31.0 g/dL  Auto Neutrophil # : 2.65 K/uL  Auto Lymphocyte # : 0.92 K/uL  Auto Monocyte # : 0.89 K/uL  Auto Eosinophil # : 0.01 K/uL  Auto Basophil # : 0.01 K/uL  Auto Neutrophil % : 58.8 %  Auto Lymphocyte % : 20.4 %  Auto Monocyte % : 19.7 %  Auto Eosinophil % : 0.2 %  Auto Basophil % : 0.2 %    10-22    132[L]  |  99  |  10  ----------------------------<  85  4.4   |  22  |  0.5[L]    Ca    7.5[L]      22 Oct 2024 07:15  Mg     2.1     10-22    TPro  6.8  /  Alb  1.9[L]  /  TBili  0.6  /  DBili  x   /  AST  28  /  ALT  <5  /  AlkPhos  170[H]  10-22    LIVER FUNCTIONS - ( 22 Oct 2024 07:15 )  Alb: 1.9 g/dL / Pro: 6.8 g/dL / ALK PHOS: 170 U/L / ALT: <5 U/L / AST: 28 U/L / GGT: x             Urinalysis Basic - ( 22 Oct 2024 07:15 )    Color: x / Appearance: x / SG: x / pH: x  Gluc: 85 mg/dL / Ketone: x  / Bili: x / Urobili: x   Blood: x / Protein: x / Nitrite: x   Leuk Esterase: x / RBC: x / WBC x   Sq Epi: x / Non Sq Epi: x / Bacteria: x        Assessment:  This is a 63y Female w/ h/o     Plan:

## 2024-10-22 NOTE — CHART NOTE - NSCHARTNOTEFT_GEN_A_CORE
Evaluated for VILMA - hospitalized for subacute stroke with neuro rec VILMA     - Patient is AOx3, able to swallow with no concerns   - can lay flat with no work of breathing  - Keep NPO pMN

## 2024-10-22 NOTE — DISCHARGE NOTE PROVIDER - NSDCFUADDAPPT_GEN_ALL_CORE_FT
Do you need a primary care doctor or follow-up with a specialist? Our care coordinators will help you find providers near you and schedule any follow-up care visits.    Monday-Friday: 9am-5pm    Call our [facility] team: [phone]

## 2024-10-22 NOTE — DISCHARGE NOTE PROVIDER - NSDCMRMEDTOKEN_GEN_ALL_CORE_FT
aspirin 81 mg oral tablet: orally once a day  levothyroxine 50 mcg (0.05 mg) oral tablet: 1 tab(s) orally once a day  metoprolol succinate 50 mg oral tablet, extended release: 1 tab(s) orally once a day  omeprazole 40 mg oral delayed release capsule: 1 cap(s) orally once a day  Vascepa 1 g oral capsule: 2 cap(s) orally 2 times a day   aspirin 81 mg oral tablet: orally once a day  atorvastatin 80 mg oral tablet: 1 tab(s) orally once a day (at bedtime)  folic acid 1 mg oral tablet: 5 tab(s) orally once a day  levothyroxine 50 mcg (0.05 mg) oral tablet: 1 tab(s) orally once a day  metoprolol succinate 50 mg oral tablet, extended release: 1 tab(s) orally once a day  omeprazole 40 mg oral delayed release capsule: 1 cap(s) orally once a day   aspirin 81 mg oral tablet: orally once a day  atorvastatin 80 mg oral tablet: 1 tab(s) orally once a day (at bedtime)  folic acid 1 mg oral tablet: 5 tab(s) orally once a day  levothyroxine 50 mcg (0.05 mg) oral tablet: 1 tab(s) orally once a day  metoprolol succinate 50 mg oral tablet, extended release: 1 tab(s) orally once a day  ocular lubricant ophthalmic solution: 1 drop(s) to each affected eye 2 times a day  ofloxacin 0.3% ophthalmic solution: 1 drop(s) to each affected eye 2 times a day  omeprazole 40 mg oral delayed release capsule: 1 cap(s) orally once a day

## 2024-10-22 NOTE — DISCHARGE NOTE PROVIDER - HOSPITAL COURSE
Patient is a 63yro female with PMHx of HTN, HLD, hypothyroidism, hx of lymphoma (in remission), and cardiac myxoma with resection (approx 2020) presenting with generalized weakness, dizziness, lightheadedness, and bilateral LE swelling. Her latest hemoglobin was 6.6 on admission. Patient admitted for management of anemia. Patient received 2 units pRBC with correction of hgb. Initial CTH showed focal hyperdensity within the right basal ganglia and scattered hypodensities within the splenium of the corpus callosum, right inferior cerebellar hemisphere as well as the bilateral temporal periventricular white matter. MRI brain showed patchy lesion in the inferior right cerebellum with petechial hemorrhage. Multiple additional nonenhancing lesions throughout the bilateral periventricular white matters, splenium of the corpus callosum, and right sabra. Neurology was consulted and recommended further workup.     Patient underwent LP and CSF samples were sent for workup of demyelinating disease vs infectious etiology. CSF samples were negative for viral or bacterial infection. Patient can follow up with neurology outpatient for results of CSF workup.    Discussion of discharge plan of care, including discharge diagnoses, medication reconciliation, and follow-ups was conducted with  *****on ***** at *****, and discharge was approved.       # Brain MRI showing nonenhancing lesions  # r/o CNS disease, demyelinating disease, paraneoplastic VS others   - MRI Patchy lesion in the inferior right cerebellum with petechial hemorrhage may represent subacute infarct. started ASA and Lipitor per Neuro   - Multiple additional nonenhancing lesions throughout the bilateral periventricular white matters, splenium of the corpus callosum, and right urmila  - LP performed 10/21 and samples sent  - patient can f/u with neurology outpatient for results of CSF and serum studies  - CSF studies Cell count, glucose, protein, LDH, Acid fast culture, fungal cultures, Crypto, EBV, Gram stain, CSF PCR, HSV, Histo, ROBI, Lyme, Borrelia, VDRL, West Nile, ACE (CSF and serum), IgG index, Autoimmune encephelopathy panel (serum and CSF), MOG, Myelin basic protein, Oligoclonal bands, Flow Cytometry, Cytopathology, Parnaeoplastic (CSF and serum).  - ESR, CRP, CARLA, Anti ds-DNA, Complement levels C3/C4, RF, ANCA, Sjogrens (SS-A, SS-B), ACE, TSH/T4, SPEP, Lyme/Borrelia screen, Syphilis,   - Anti-smith Ab, Anti-histone Antibodies, Anti-RNP Antibodies, Cryoglobulins   - HIV, EBV PCR, CMV PCR, RPR, Hepatitis panel  - CT A/P showed several hepatic hypodensities 0.7 cm, splenic hypodensity lesion measuring 2.3 cm   - CTA of head and neck negative for acute pathology.     # Acute on Chronic normocytic anemia, symptomatic   - no evidence of bleeding, SHAY neg  - hbg responded to 2 u prbc  - no evidence of hemolysis  - B12 WNL. low folate  - c/w folic acid   - c/w Venofer (today day 2 of 5)  - f/u Dr. Ochoa as hematology/Oncology    # Fever  # Weakness  # R sided vision changes  # Hx of non hodgkin's lymphoma  # Hx of cardiac myxoma s/p resection   - RVP negative  - BCx and UA negative  - per ID High suspicion for malignancy as monocytosis, lung findings, splenic/liver/brain findings, elevated ferritin  - off antimicrobials   - f/u outpatient for malignancy workup  - Right lower lobe nodular, f/u in 3 mo w Pulmonology     # Hypothyroid  - c/w synthroid    # HTN   # HLD  - c/w metoprolol and atorvastatin   Patient is a 63yro female with PMHx of HTN, HLD, hypothyroidism, hx of lymphoma (in remission), and cardiac myxoma with resection (approx 2020) presenting with generalized weakness, dizziness, lightheadedness, and bilateral LE swelling. Her latest hemoglobin was 6.6 on admission. Patient admitted for management of anemia. Patient received 2 units pRBC with correction of hgb. Initial CTH showed focal hyperdensity within the right basal ganglia and scattered hypodensities within the splenium of the corpus callosum, right inferior cerebellar hemisphere as well as the bilateral temporal periventricular white matter. MRI brain showed patchy lesion in the inferior right cerebellum with petechial hemorrhage. Multiple additional nonenhancing lesions throughout the bilateral periventricular white matters, splenium of the corpus callosum, and right sabra. Neurology was consulted and recommended further workup.     Patient underwent LP and CSF samples were sent for workup of demyelinating disease vs infectious etiology. CSF samples were negative for viral or bacterial infection. Patient can follow up with neurology outpatient for results of CSF workup.    Discussion of discharge plan of care, including discharge diagnoses, medication reconciliation, and follow-ups was conducted with  on ***** at *****, and discharge was approved.       # Brain MRI showing nonenhancing lesions  # r/o CNS disease, demyelinating disease, paraneoplastic VS others   - MRI Patchy lesion in the inferior right cerebellum with petechial hemorrhage may represent subacute infarct. started ASA and Lipitor per Neuro   - Multiple additional nonenhancing lesions throughout the bilateral periventricular white matters, splenium of the corpus callosum, and right urmila  - LP performed 10/21 and samples sent  - patient can f/u with neurology outpatient for results of CSF and serum studies  - CSF studies Cell count, glucose, protein, LDH, Acid fast culture, fungal cultures, Crypto, EBV, Gram stain, CSF PCR, HSV, Histo, ROBI, Lyme, Borrelia, VDRL, West Nile, ACE (CSF and serum), IgG index, Autoimmune encephelopathy panel (serum and CSF), MOG, Myelin basic protein, Oligoclonal bands, Flow Cytometry, Cytopathology, Parnaeoplastic (CSF and serum).  - ESR, CRP, CARLA, Anti ds-DNA, Complement levels C3/C4, RF, ANCA, Sjogrens (SS-A, SS-B), ACE, TSH/T4, SPEP, Lyme/Borrelia screen, Syphilis,   - Anti-smith Ab, Anti-histone Antibodies, Anti-RNP Antibodies, Cryoglobulins   - HIV, EBV PCR, CMV PCR, RPR, Hepatitis panel  - CT A/P showed several hepatic hypodensities 0.7 cm, splenic hypodensity lesion measuring 2.3 cm   - CTA of head and neck negative for acute pathology.     # Acute on Chronic normocytic anemia, symptomatic   - no evidence of bleeding, SHAY neg  - hbg responded to 2 u prbc  - no evidence of hemolysis  - B12 WNL. low folate  - c/w folic acid   - c/w Venofer (today day 2 of 5)  - f/u Dr. Ochoa as hematology/Oncology    # Fever  # Weakness  # R sided vision changes  # Hx of non hodgkin's lymphoma  # Hx of cardiac myxoma s/p resection   - RVP negative  - BCx and UA negative  - per ID High suspicion for malignancy as monocytosis, lung findings, splenic/liver/brain findings, elevated ferritin  - off antimicrobials   - f/u outpatient for malignancy workup  - Right lower lobe nodular, f/u in 3 mo w Pulmonology     # Hypothyroid  - c/w synthroid    # HTN   # HLD  - c/w metoprolol and atorvastatin   63yro female with PMHx of HTN, HLD, hypothyroidism, hx of lymphoma (in remission), and cardiac myxoma with resection (approx 2020) presenting with fever, generalized weakness, dizziness, lightheadedness.    #Fever  #Generalized weakness  #Dizziness, lightheadedness.  - Brain MRI showing nonenhancing lesions however neurology does not recommend biopsy - needs further f/u with neuro in clinic and f/u clinically   - Per Neurology doubts: Demyelinating disease or space occupying lesion like malignancy to need a brain bx - no bx indicated per neuro at this time  - MRI BRAIN --- Patchy lesion in the inferior right cerebellum with petechial hemorrhage may represent subacute infarct. started  ASA and Lipitor per Neuro   -  MRI BRAIN ----Multiple additional nonenhancing lesions throughout the bilateral periventricular white matters, splenium of the corpus callosum, and right sabra  - LP performed 10/21 extensive w/up sent most of which was un able to be performed due to Quantity not sufficient for CSF per lab - this was discussed with Neurologist and recommended no need to repeat LP to run rest of tests. Neurology also does not recommend any neurosx evaluation or brain biopsy at this time as he thinks findings are non-specific. Neurologist recommends the pt to f/u at [facility] FOR FURTHER INVESTIGATION.   - EBV ++ IgG   - CSF glucose 41, CSF protein 187. No neutrophils or organisms appreciated on CSF gram stain. No growth on CSF culture. CSF PCR negative.  - CSF , AFB unable to be performed, fungal cultures no growth, crypto negative, HSV negative  - CARLA/COMPLEMENT LEVEL NL   - , , CARLA , Anti ds-DNA <1, C3/C4 , RF 10, p-ANCA/c-ANCA negative, a-ANCA indeterminate, SS-A/SS-B <0.2, ACE , TSH 5.68, T4 4.8, SPEP   - HIV negative, EBV PCR , CMV PCR negative, RPR negative, hepatitis panel nonreactive  - CT A/P showed several hepatic hypo densities 0.7 cm, splenic hypodensity lesion measuring 2.3 cm.    -->  PATIENT HAD MRI DONE 2 WEEKS AGO WITH DR STEFFANIE DANG ONCOLOGIST - WHICH SHOWED NO PELVIC OR ABD LAD, ENLARGED LIVER AND SPLEEN. D/W DR ANDREWS. NO NEED TO REPEAT MRI ABD.  - MRI C and T spine w/ and w/o shows pontine and right cerebellar signal abnormalities, multilevel spondylosis and spondylolisthesis most prominent at C4-5 with spinal and left foraminal stenosis. 8mm right pulmonary nodule in right lower lobe appreciated.   - CTA of head and neck negative for pathology  - f/u TA Duplex to r/o Temporal Arteritis --- negative for arteritis   - f/u EP for ILR--- placed 10/28     # Acute on Chronic normocytic anemia, symptomatic   - no evidence of bleeding, SHAY neg  - hbg responded to 2 u prbc  - no evidence of hemolysis  - B12 WNL. low folate  - c/w folic acid   - Completed 5 days of Venofer  - heme/onc eval noted  - LDH elevated and direct fransisco test is negative , pt likely not hemolyzing   - Elevated LDH / Hepat splenomegaly  -> Rule out Recurrence of Lymphoma --> PET CT outpt w/ Dr Andrews and MARGIE Richardson     # Acute Stroke unexplained etiology at this time   # R sided vision changes  - still have intermittent fever, last on 10/21 evening   - RVP negative  - BCx and UA negative  - per ID High suspicion for malignancy as monocytosis, lung findings, splenic/liver/brain findings, elevated ferritin  - Monitor off antimicrobials   - malignancy workup  - Right lower lobe nodular, f/u in 3 mo w Pulmonology   - per Ophthalmology, anticoagulation use minimized during hospital course due to extensive subretinal fluid and subretinal hemorrhages with para macular exudates in right eye suggestive of neovascular AMD process. Patient also has retinal hemorrhages, peripheral and temporal, in left eye. Recommends hematology consult due to retinal hemorrhages and low platelets.  - VILMA completed for workup of subacute strokes and hx of cardiac myxoma. Report shows no cardioembolic source of CVA.   - EP consult placed for ILR placement for possible Afib.     #Hypothyroid  - c/w synthroid  - TSH 5.68, T4 4.8    #HTN   #HLD  - c/w metoprolol and atorvastatin     63yro female with PMHx of HTN, HLD, hypothyroidism, hx of lymphoma (in remission), and cardiac myxoma with resection (approx 2020) presenting with fever, generalized weakness, dizziness, lightheadedness.    #Fever  #Generalized weakness  #Dizziness, lightheadedness.  - Brain MRI showing nonenhancing lesions however neurology does not recommend biopsy - needs further f/u with neuro in clinic and f/u clinically   - Per Neurology doubts: Demyelinating disease or space occupying lesion like malignancy to need a brain bx - no bx indicated per neuro at this time  - MRI BRAIN --- Patchy lesion in the inferior right cerebellum with petechial hemorrhage may represent subacute infarct. started  ASA and Lipitor per Neuro   -  MRI BRAIN ----Multiple additional nonenhancing lesions throughout the bilateral periventricular white matters, splenium of the corpus callosum, and right sabra  - LP performed 10/21 extensive w/up sent most of which was un able to be performed due to Quantity not sufficient for CSF per lab - this was discussed with Neurologist and recommended no need to repeat LP to run rest of tests. Neurology also does not recommend any neurosx evaluation or brain biopsy at this time as he thinks findings are non-specific. Neurologist recommends the pt to f/u at [facility] CL[facility] FOR FURTHER INVESTIGATION.   - EBV ++ IgG   - CSF glucose 41, CSF protein 187. No neutrophils or organisms appreciated on CSF gram stain. No growth on CSF culture. CSF PCR negative.  - CSF , AFB unable to be performed, fungal cultures no growth, crypto negative, HSV negative  - CARLA/COMPLEMENT LEVEL NL   - , , CARLA , Anti ds-DNA <1, C3/C4 , RF 10, p-ANCA/c-ANCA negative, a-ANCA indeterminate, SS-A/SS-B <0.2, ACE , TSH 5.68, T4 4.8, SPEP   - HIV negative, EBV PCR , CMV PCR negative, RPR negative, hepatitis panel nonreactive  - CT A/P showed several hepatic hypo densities 0.7 cm, splenic hypodensity lesion measuring 2.3 cm.    -->  PATIENT HAD MRI DONE 2 WEEKS AGO WITH DR STEFFANIE DANG ONCOLOGIST - WHICH SHOWED NO PELVIC OR ABD LAD, ENLARGED LIVER AND SPLEEN. D/W DR ANDREWS. NO NEED TO REPEAT MRI ABD.  - MRI C and T spine w/ and w/o shows pontine and right cerebellar signal abnormalities, multilevel spondylosis and spondylolisthesis most prominent at C4-5 with spinal and left foraminal stenosis. 8mm right pulmonary nodule in right lower lobe appreciated.   - CTA of head and neck negative for pathology  - f/u TA Duplex to r/o Temporal Arteritis --- negative for arteritis   - f/u EP for ILR--- placed 10/28     # Acute on Chronic normocytic anemia, symptomatic   - no evidence of bleeding, SHAY neg  - hbg responded to 2 u prbc  - no evidence of hemolysis  - B12 WNL. low folate  - c/w folic acid   - Completed 5 days of Venofer  - heme/onc eval noted  - LDH elevated and direct fransisco test is negative , pt likely not hemolyzing   - Elevated LDH / Hepat splenomegaly  -> Rule out Recurrence of Lymphoma --> PET CT outpt w/ Dr Andrews and MARGIE Richardson     # Acute Stroke unexplained etiology at this time   # R sided vision changes  - still have intermittent fever, last on 10/21 evening   - RVP negative  - BCx and UA negative  - per ID High suspicion for malignancy as monocytosis, lung findings, splenic/liver/brain findings, elevated ferritin  - Monitor off antimicrobials   - malignancy workup  - Right lower lobe nodular, f/u in 3 mo w Pulmonology   - per Ophthalmology, anticoagulation use minimized during hospital course due to extensive subretinal fluid and subretinal hemorrhages with para macular exudates in right eye suggestive of neovascular AMD process. Patient also has retinal hemorrhages, peripheral and temporal, in left eye. Recommends hematology consult due to retinal hemorrhages and low platelets.  - VILMA completed for workup of subacute strokes and hx of cardiac myxoma. Report shows no cardioembolic source of CVA.   - EP consult placed for ILR placement for possible Afib.     #Hypothyroid  - c/w synthroid  - TSH 5.68, T4 4.8    #HTN   #HLD  - c/w metoprolol and atorvastatin    Patient is stable for discharge..

## 2024-10-22 NOTE — PROGRESS NOTE ADULT - SUBJECTIVE AND OBJECTIVE BOX
TANISHA HOPKINS  63y  Female      Patient is a 63y old  Female who presents with a chief complaint of Anemia (22 Oct 2024 13:56)      INTERVAL HPI/OVERNIGHT EVENTS:  She feels weak, no fever today, no new weakness  Vital Signs Last 24 Hrs  T(C): 36.8 (22 Oct 2024 12:00), Max: 37.4 (21 Oct 2024 19:47)  T(F): 98.2 (22 Oct 2024 12:00), Max: 99.3 (21 Oct 2024 19:47)  HR: 109 (22 Oct 2024 12:00) (107 - 122)  BP: 98/60 (22 Oct 2024 12:00) (97/63 - 100/67)  BP(mean): --  RR: 16 (22 Oct 2024 12:00) (16 - 20)  SpO2: 96% (22 Oct 2024 12:00) (93% - 96%)    Parameters below as of 22 Oct 2024 05:02  Patient On (Oxygen Delivery Method): room air                Consultant(s) Notes Reviewed:  [x ] YES  [ ] NO          MEDICATIONS  (STANDING):  aspirin enteric coated 81 milliGRAM(s) Oral daily  atorvastatin 80 milliGRAM(s) Oral at bedtime  chlorhexidine 2% Cloths 1 Application(s) Topical <User Schedule>  enoxaparin Injectable 30 milliGRAM(s) SubCutaneous every 24 hours  folic acid 5 milliGRAM(s) Oral daily  influenza   Vaccine 0.5 milliLiter(s) IntraMuscular once  iron sucrose IVPB 200 milliGRAM(s) IV Intermittent <User Schedule>  levothyroxine 50 MICROGram(s) Oral daily  metoprolol succinate ER 50 milliGRAM(s) Oral daily  pantoprazole    Tablet 40 milliGRAM(s) Oral before breakfast  polyethylene glycol 3350 17 Gram(s) Oral every 12 hours  senna 2 Tablet(s) Oral at bedtime    MEDICATIONS  (PRN):  acetaminophen     Tablet .. 650 milliGRAM(s) Oral every 6 hours PRN Temp greater or equal to 38C (100.4F), Mild Pain (1 - 3)  melatonin 3 milliGRAM(s) Oral at bedtime PRN Insomnia      LABS                          9.1    4.51  )-----------( 107      ( 21 Oct 2024 07:33 )             29.4     10-22    132[L]  |  99  |  10  ----------------------------<  85  4.4   |  22  |  0.5[L]    Ca    7.5[L]      22 Oct 2024 07:15  Mg     2.1     10-22    TPro  6.8  /  Alb  1.9[L]  /  TBili  0.6  /  DBili  x   /  AST  28  /  ALT  <5  /  AlkPhos  170[H]  10-22      Urinalysis Basic - ( 22 Oct 2024 07:15 )    Color: x / Appearance: x / SG: x / pH: x  Gluc: 85 mg/dL / Ketone: x  / Bili: x / Urobili: x   Blood: x / Protein: x / Nitrite: x   Leuk Esterase: x / RBC: x / WBC x   Sq Epi: x / Non Sq Epi: x / Bacteria: x        Lactate Trend  10-18 @ 00:35 Lactate:2.5   10-17 @ 22:08 Lactate:2.2         CAPILLARY BLOOD GLUCOSE          Culture - Acid Fast - CSF (collected 10-21-24 @ 15:15)  Source: .CSF CSF    Culture - CSF with Gram Stain (collected 10-21-24 @ 15:15)  Source: .CSF CSF  Gram Stain (10-21-24 @ 22:33):    No polymorphonuclear cells seen    No organisms seen    by cytocentrifuge    Culture - Fungal, CSF (collected 10-21-24 @ 15:15)  Source: .CSF CSF  Preliminary Report (10-22-24 @ 07:19):    Testing in progress    Urinalysis with Rflx Culture (collected 10-18-24 @ 00:03)    Culture - Blood (collected 10-17-24 @ 22:08)  Source: .Blood BLOOD  Preliminary Report (10-22-24 @ 09:00):    No growth at 4 days    Culture - Blood (collected 10-17-24 @ 22:08)  Source: .Blood BLOOD  Preliminary Report (10-22-24 @ 09:00):    No growth at 4 days        RADIOLOGY & ADDITIONAL TESTS:    Imaging Personally Reviewed:  [ ] YES  [ ] NO    HEALTH ISSUES - PROBLEM Dx:          PHYSICAL EXAM:  GENERAL: NAD, well-developed.  HEAD:  Atraumatic, Normocephalic.  EYES: EOMI, PERRLA, conjunctiva and sclera clear.  NECK: Supple, No JVD.  CHEST/LUNG: Clear to auscultation bilaterally; No wheeze.  HEART: Regular rate and rhythm; S1 S2.   ABDOMEN: Soft, Nontender, Nondistended; Bowel sounds present.  EXTREMITIES:  2+ Peripheral Pulses, No clubbing, cyanosis, or edema.  PSYCH: AAOx3.  NEUROLOGY: non-focal.  SKIN: No rashes or lesions. Calm/Appropriate

## 2024-10-22 NOTE — DISCHARGE NOTE PROVIDER - NSDCFUADDINST_GEN_ALL_CORE_FT
please follow up  at [facility] NEUROIMMUNOLOGY CLINIC FOR FURTHER INVESTIGATION.   PET CT outpt w/ Dr Andres Please follow up  at [facility]IMM[facility]OLOGY CLINIC FOR FURTHER INVESTIGATION.   PET CT outpt w/ Dr STEFFANIE DANG ONCOLOGIST

## 2024-10-22 NOTE — DISCHARGE NOTE PROVIDER - NPI NUMBER (FOR SYSADMIN USE ONLY) :
[[id number]] [[id number]],[[id number]] [[id number]],[[id number]],[[id number]]

## 2024-10-22 NOTE — PROGRESS NOTE ADULT - ASSESSMENT
63yro female with PMHx of HTN, HLD, hypothyroidism, hx of lymphoma (in remission), and cardiac myxoma with resection (approx 2020) presenting with fever, generalized weakness, dizziness, lightheadedness.    A/P:   Right cerebellar infarct:   Patient with weakness, fever, right side vision loss.   Brain MRI showed Patchy lesion in the inferior right cerebellum with petechial hemorrhage may represent subacute infarct, Multiple additional nonenhancing lesions throughout the bilateral periventricular white matters, splenium of the corpus callosum, and right urmila DDx demyelinating disease, as well as paraneoplastic/autoimmune   encephalitis and lymphoma (less likely given lack of enhancement).   s/p LP 10/21 fluid negative for infection, Protein was elevated 87, glucose 41, PCR negative.   CT angio of head and neck showed no   Further work up is pending to rule out autoimmune disease, infection or Autoimmune encephelopathy   Neurology recommended VILMA to rule out cardiac cause for CVA, thoracic and cervical spine MRI.     Fever:   likely due to inflammatory disease as above vs autoimmune disease  No signs of infectious etiology.     Acute on Chronic normocytic anemia, symptomatic   Likely anemia of chronic disease  iron studies showed low iron, low TIBC, Ferritin is high 2600   B12 WNL. low folate folic acid       Hypothyroid  - c/w synthroid    # HTN   # HLD  - c/w metoprolol and atorvastatin      DVT prophylaxis: Lovenox    # Code status: Full Code  # Disposition: acute    Pending: MRI of Thoracic and cervical spine, VILMA

## 2024-10-22 NOTE — DISCHARGE NOTE PROVIDER - NSDCCAREPROVSEEN_GEN_ALL_CORE_FT
[facility] med, [facility] EP, [facility] neurology, [facility] oncology

## 2024-10-22 NOTE — PROGRESS NOTE ADULT - SUBJECTIVE AND OBJECTIVE BOX
24H events:    Patient is a 63y old Female who presents with a chief complaint of Anemia (21 Oct 2024 10:02)    Primary diagnosis of Fever          Today is hospital day 5d.   This morning patient was seen and examined at bedside, resting comfortably in bed.    No acute or major events overnight.      PAST MEDICAL & SURGICAL HISTORY  Hypertension    Hyperlipemia    Hypothyroidism    History of lymphoma    Cardiac myxoma      SOCIAL HISTORY:  Social History:      ALLERGIES:  penicillin (Unknown)    MEDICATIONS:  STANDING MEDICATIONS  aspirin enteric coated 81 milliGRAM(s) Oral daily  atorvastatin 80 milliGRAM(s) Oral at bedtime  chlorhexidine 2% Cloths 1 Application(s) Topical <User Schedule>  enoxaparin Injectable 30 milliGRAM(s) SubCutaneous every 24 hours  folic acid 5 milliGRAM(s) Oral daily  influenza   Vaccine 0.5 milliLiter(s) IntraMuscular once  iron sucrose IVPB 200 milliGRAM(s) IV Intermittent <User Schedule>  levothyroxine 50 MICROGram(s) Oral daily  metoprolol succinate ER 50 milliGRAM(s) Oral daily  pantoprazole    Tablet 40 milliGRAM(s) Oral before breakfast  polyethylene glycol 3350 17 Gram(s) Oral every 12 hours  senna 2 Tablet(s) Oral at bedtime    PRN MEDICATIONS  acetaminophen     Tablet .. 650 milliGRAM(s) Oral every 6 hours PRN  melatonin 3 milliGRAM(s) Oral at bedtime PRN    VITALS:   T(F): 98.2  HR: 109  BP: 98/60  RR: 16  SpO2: 96%    PHYSICAL EXAM:  GENERAL: NAD, lying in bed comfortably  HEAD:  Atraumatic, Normocephalic  EYES: EOMI, PERRLA, conjunctiva and sclera clear  ENT: Moist mucous membranes  NECK: Supple, No JVD  CHEST/LUNG: Clear to auscultation bilaterally; No rales, rhonchi, wheezing, or rubs. Unlabored respirations  HEART: Regular rate and rhythm; No murmurs, rubs, or gallops  ABDOMEN: BSx4; Soft, nontender, nondistended  EXTREMITIES:  2+ Peripheral Pulses, brisk capillary refill. No clubbing, cyanosis, or edema  NERVOUS SYSTEM:  A&Ox3, no focal deficits   SKIN: No rashes or lesions      LABS:                        9.1    4.51  )-----------( 107      ( 21 Oct 2024 07:33 )             29.4     10-22    132[L]  |  99  |  10  ----------------------------<  85  4.4   |  22  |  0.5[L]    Ca    7.5[L]      22 Oct 2024 07:15  Mg     2.1     10-22    TPro  6.8  /  Alb  1.9[L]  /  TBili  0.6  /  DBili  x   /  AST  28  /  ALT  <5  /  AlkPhos  170[H]  10-22      Urinalysis Basic - ( 22 Oct 2024 07:15 )    Color: x / Appearance: x / SG: x / pH: x  Gluc: 85 mg/dL / Ketone: x  / Bili: x / Urobili: x   Blood: x / Protein: x / Nitrite: x   Leuk Esterase: x / RBC: x / WBC x   Sq Epi: x / Non Sq Epi: x / Bacteria: x        Sedimentation Rate, Erythrocyte: 120 mm/Hr *H* (10-22-24 @ 08:02)      Culture - CSF with Gram Stain (collected 21 Oct 2024 15:15)  Source: .CSF CSF  Gram Stain (21 Oct 2024 22:33):    No polymorphonuclear cells seen    No organisms seen    by cytocentrifuge  Preliminary Report (22 Oct 2024 14:54):    No growth    Culture - Acid Fast - CSF (collected 21 Oct 2024 15:15)  Source: .CSF CSF    Culture - Fungal, CSF (collected 21 Oct 2024 15:15)  Source: .CSF CSF  Preliminary Report (22 Oct 2024 07:19):    Testing in progress

## 2024-10-23 ENCOUNTER — RESULT REVIEW (OUTPATIENT)
Age: 64
End: 2024-10-23

## 2024-10-23 LAB
ALBUMIN SERPL ELPH-MCNC: 1.8 G/DL — LOW (ref 3.5–5.2)
ALP SERPL-CCNC: 173 U/L — HIGH (ref 30–115)
ALT FLD-CCNC: 5 U/L — SIGNIFICANT CHANGE UP (ref 0–41)
ANION GAP SERPL CALC-SCNC: 8 MMOL/L — SIGNIFICANT CHANGE UP (ref 7–14)
ANTI-RIBONUCLEAR PROTEIN: <0.2 AI — SIGNIFICANT CHANGE UP
ANTI-RIBONUCLEAR PROTEIN: <0.2 AI — SIGNIFICANT CHANGE UP
AST SERPL-CCNC: 31 U/L — SIGNIFICANT CHANGE UP (ref 0–41)
AUTO DIFF PNL BLD: ABNORMAL
BASOPHILS # BLD AUTO: 0.01 K/UL — SIGNIFICANT CHANGE UP (ref 0–0.2)
BASOPHILS NFR BLD AUTO: 0.2 % — SIGNIFICANT CHANGE UP (ref 0–1)
BILIRUB SERPL-MCNC: 0.8 MG/DL — SIGNIFICANT CHANGE UP (ref 0.2–1.2)
BUN SERPL-MCNC: 13 MG/DL — SIGNIFICANT CHANGE UP (ref 10–20)
C-ANCA SER-ACNC: NEGATIVE — SIGNIFICANT CHANGE UP
C3 SERPL-MCNC: 140 MG/DL — SIGNIFICANT CHANGE UP (ref 81–157)
C4 SERPL-MCNC: 26 MG/DL — SIGNIFICANT CHANGE UP (ref 13–39)
CALCIUM SERPL-MCNC: 7.3 MG/DL — LOW (ref 8.4–10.5)
CHLORIDE SERPL-SCNC: 100 MMOL/L — SIGNIFICANT CHANGE UP (ref 98–110)
CMV DNA CSF QL NAA+PROBE: SIGNIFICANT CHANGE UP IU/ML
CMV DNA SPEC NAA+PROBE-LOG#: SIGNIFICANT CHANGE UP LOG10IU/ML
CO2 SERPL-SCNC: 24 MMOL/L — SIGNIFICANT CHANGE UP (ref 17–32)
CREAT SERPL-MCNC: 0.5 MG/DL — LOW (ref 0.7–1.5)
CULTURE RESULTS: SIGNIFICANT CHANGE UP
CULTURE RESULTS: SIGNIFICANT CHANGE UP
DSDNA AB FLD-ACNC: <0.2 AI — SIGNIFICANT CHANGE UP
DSDNA AB SER-ACNC: <1 IU/ML — SIGNIFICANT CHANGE UP
EBV DNA SERPL NAA+PROBE-ACNC: 36 IU/ML — HIGH
EBVPCR LOG: 1.56 LOG10IU/ML — HIGH
EGFR: 105 ML/MIN/1.73M2 — SIGNIFICANT CHANGE UP
ENA SM AB FLD QL: <0.2 AI — SIGNIFICANT CHANGE UP
ENA SS-A AB FLD IA-ACNC: <0.2 AI — SIGNIFICANT CHANGE UP
EOSINOPHIL # BLD AUTO: 0 K/UL — SIGNIFICANT CHANGE UP (ref 0–0.7)
EOSINOPHIL NFR BLD AUTO: 0 % — SIGNIFICANT CHANGE UP (ref 0–8)
GLUCOSE SERPL-MCNC: 74 MG/DL — SIGNIFICANT CHANGE UP (ref 70–99)
HAV IGM SER-ACNC: SIGNIFICANT CHANGE UP
HBV CORE IGM SER-ACNC: SIGNIFICANT CHANGE UP
HBV SURFACE AG SER-ACNC: SIGNIFICANT CHANGE UP
HCT VFR BLD CALC: 28 % — LOW (ref 37–47)
HCV AB S/CO SERPL IA: 0.15 S/CO — SIGNIFICANT CHANGE UP (ref 0–0.99)
HCV AB SERPL-IMP: SIGNIFICANT CHANGE UP
HGB BLD-MCNC: 8.7 G/DL — LOW (ref 12–16)
HIV 1+2 AB+HIV1 P24 AG SERPL QL IA: SIGNIFICANT CHANGE UP
IMM GRANULOCYTES NFR BLD AUTO: 0.6 % — HIGH (ref 0.1–0.3)
LDH CSF L TO P-CCNC: 124 U/L — SIGNIFICANT CHANGE UP
LDH FLD-CCNC: 124 U/L — SIGNIFICANT CHANGE UP
LYMPHOCYTES # BLD AUTO: 1.05 K/UL — LOW (ref 1.2–3.4)
LYMPHOCYTES # BLD AUTO: 22.7 % — SIGNIFICANT CHANGE UP (ref 20.5–51.1)
MAGNESIUM SERPL-MCNC: 2.1 MG/DL — SIGNIFICANT CHANGE UP (ref 1.8–2.4)
MCHC RBC-ENTMCNC: 25.9 PG — LOW (ref 27–31)
MCHC RBC-ENTMCNC: 31.1 G/DL — LOW (ref 32–37)
MCV RBC AUTO: 83.3 FL — SIGNIFICANT CHANGE UP (ref 81–99)
MONOCYTES # BLD AUTO: 0.88 K/UL — HIGH (ref 0.1–0.6)
MONOCYTES NFR BLD AUTO: 19 % — HIGH (ref 1.7–9.3)
NEUTROPHILS # BLD AUTO: 2.66 K/UL — SIGNIFICANT CHANGE UP (ref 1.4–6.5)
NEUTROPHILS NFR BLD AUTO: 57.5 % — SIGNIFICANT CHANGE UP (ref 42.2–75.2)
NRBC # BLD: 0 /100 WBCS — SIGNIFICANT CHANGE UP (ref 0–0)
P-ANCA SER-ACNC: NEGATIVE — SIGNIFICANT CHANGE UP
PLATELET # BLD AUTO: 116 K/UL — LOW (ref 130–400)
PMV BLD: 10.8 FL — HIGH (ref 7.4–10.4)
POTASSIUM SERPL-MCNC: 4.3 MMOL/L — SIGNIFICANT CHANGE UP (ref 3.5–5)
POTASSIUM SERPL-SCNC: 4.3 MMOL/L — SIGNIFICANT CHANGE UP (ref 3.5–5)
PROT SERPL-MCNC: 6.7 G/DL — SIGNIFICANT CHANGE UP (ref 6–8)
RBC # BLD: 3.36 M/UL — LOW (ref 4.2–5.4)
RBC # FLD: 17.9 % — HIGH (ref 11.5–14.5)
SODIUM SERPL-SCNC: 132 MMOL/L — LOW (ref 135–146)
SPECIMEN SOURCE: SIGNIFICANT CHANGE UP
SPECIMEN SOURCE: SIGNIFICANT CHANGE UP
T PALLIDUM AB TITR SER: NEGATIVE — SIGNIFICANT CHANGE UP
WBC # BLD: 4.63 K/UL — LOW (ref 4.8–10.8)
WBC # FLD AUTO: 4.63 K/UL — LOW (ref 4.8–10.8)

## 2024-10-23 PROCEDURE — 93312 ECHO TRANSESOPHAGEAL: CPT | Mod: 26

## 2024-10-23 PROCEDURE — 93325 DOPPLER ECHO COLOR FLOW MAPG: CPT | Mod: 26

## 2024-10-23 PROCEDURE — 93320 DOPPLER ECHO COMPLETE: CPT | Mod: 26

## 2024-10-23 PROCEDURE — 99254 IP/OBS CNSLTJ NEW/EST MOD 60: CPT

## 2024-10-23 PROCEDURE — 99233 SBSQ HOSP IP/OBS HIGH 50: CPT

## 2024-10-23 RX ADMIN — Medication 80 MILLIGRAM(S): at 22:04

## 2024-10-23 RX ADMIN — IRON SUCROSE 100 MILLIGRAM(S): 20 INJECTION, SOLUTION INTRAVENOUS at 21:06

## 2024-10-23 RX ADMIN — Medication 650 MILLIGRAM(S): at 10:59

## 2024-10-23 RX ADMIN — Medication 650 MILLIGRAM(S): at 10:05

## 2024-10-23 RX ADMIN — CHLORHEXIDINE GLUCONATE 1 APPLICATION(S): 40 SOLUTION TOPICAL at 05:58

## 2024-10-23 RX ADMIN — POLYETHYLENE GLYCOL 3350 17 GRAM(S): 17 POWDER, FOR SOLUTION ORAL at 06:48

## 2024-10-23 RX ADMIN — Medication 2 TABLET(S): at 22:04

## 2024-10-23 RX ADMIN — Medication 50 MICROGRAM(S): at 05:56

## 2024-10-23 RX ADMIN — PANTOPRAZOLE SODIUM 40 MILLIGRAM(S): 40 TABLET, DELAYED RELEASE ORAL at 05:56

## 2024-10-23 NOTE — CONSULT NOTE ADULT - NS ATTEND AMEND GEN_ALL_CORE FT
I have evaluated the patient with the fellow/resident/ACP, Adis, and I am providing specific recommendations regarding the patient's management. My discussion with the team members included review of the patient's history, physical examination, laboratory data and procedural studies. I agree with the documented findings and plan as detailed above, with the exceptions noted.     I discussed with patient's  (HCP) the placement of an implantable loop recorder for long-term detection of atrial tachyarrhythmias including Atrial Fibrillation, as a possible cause of cryptogenic stroke. I explained to patient in great details, that in case of presence of atrial fibrillation, full dose anticoagulation needs to be started. I explained risks, benefits, alternatives, the nature of the procedure, and follow up care after device is implanted. We also discussed remote monitoring in detail, including monthly billing, and the possibility of copays/deductibles. I also discussed the risks of bleeding, hematoma, infection, device malfunction, device recalls/advisory, and sensitivity to cardiac monitor material. Patient/family expressed understanding of discussion and agreement to proceed with the implantation of a loop recorder.    ILR implantation on 10/28/2024    Laura Kwon MD  Cardiac Electrophysiology

## 2024-10-23 NOTE — CHART NOTE - NSCHARTNOTEFT_GEN_A_CORE
Pre Procedure Note:  Patient Name: TANISHA HOPKINS   Age:63y  MRN: [id number]  Location: [address]  Procedure Service:  Cardiology   Procedure Name: Transesophageal Echo	    Plan: 	  VILMA Procedure Candidate	X YES          Day of Procedure Attestation:  I have personally seen, examined, and participated in the care of this patient. I have reviewed all pertinent information, including history, physical exam, sedation plan, and agree except as noted.    Attestation Statements:  I have personally seen and examined the patient.  I fully participated in the care of this patient.  I have made amendments to the documentation where necessary, and agree with the history, physical exam, and plan as documented by the Fellow.

## 2024-10-23 NOTE — CONSULT NOTE ADULT - SUBJECTIVE AND OBJECTIVE BOX
Patient is a 63y old  Female who presents with a chief complaint of Anemia (23 Oct 2024 10:29)    HPI: Patient is a 63yro female with PMHx of HTN, HLD, hypothyroidism, hx of lymphoma (in remission), and cardiac myxoma with resection (approx 2020) presenting with generalized weakness, dizziness, lightheadedness, and bilateral LE swelling. These symptoms have been ongoing for the past 2 years now, with episodes of syncope (last episode 3 mos ago), but the symptoms were worsening in the last two weeks along with a new R sided vision blurriness, intermittent headaches, and polydipsia. Today she went for a follow up with her cardiologist (Dr. Portillo), where it was found that her hgb was 7.5 and tachycardic, so she was sent to the ED. Patient denies any melena, hematochezia, N/V, or abdominal pain.  Of note, patient have been following with her hematologist/oncologist Dr. Ochoa for anemia workup in the past 2 years, including bone marrow biopsy, MRI, EGD/colonoscopy, and everything has been negative so far. Patient denies any SOB, chest pain, bowel changes, cough, recent travel, recent illnesses. In the ED, patient was initially febrile Tmax 101.9. Patient was given 1x aztreonam and vancomycin, 1L LR, and tylenol, with the resolution of her fever. Patient found to have subacute cerebellar infarct on MRI and EP consulted for ILR.      PAST MEDICAL & SURGICAL HISTORY:  Hypertension      Hyperlipemia      Hypothyroidism      History of lymphoma      Cardiac myxoma    PREVIOUS DIAGNOSTIC TESTING:      ECHO  FINDINGS:  < from: TTE Echo Complete w/o Contrast w/ Doppler (10.18.24 @ 12:31) >  Summary:   1. Normal global left ventricular systolic function.   2. Left ventricular ejection fraction, by visual estimation, is 55 to   60%.   3. Normal right ventricular size and function.   4. Mild tricuspid regurgitation.   5. Normal pulmonary artery pressure.    < end of copied text >      STRESS  FINDINGS:    CATHETERIZATION  FINDINGS:    ELECTROPHYSIOLOGY STUDY  FINDINGS:    CAROTID ULTRASOUND:  FINDINGS    VENOUS DUPLEX SCAN:  FINDINGS:    CHEST CT PULMONARY ANGIO with IV Contrast:  FINDINGS:    MEDICATIONS  (STANDING):  aspirin enteric coated 81 milliGRAM(s) Oral daily  atorvastatin 80 milliGRAM(s) Oral at bedtime  chlorhexidine 2% Cloths 1 Application(s) Topical <User Schedule>  enoxaparin Injectable 30 milliGRAM(s) SubCutaneous every 24 hours  folic acid 5 milliGRAM(s) Oral daily  influenza   Vaccine 0.5 milliLiter(s) IntraMuscular once  iron sucrose IVPB 200 milliGRAM(s) IV Intermittent <User Schedule>  levothyroxine 50 MICROGram(s) Oral daily  metoprolol succinate ER 50 milliGRAM(s) Oral daily  pantoprazole    Tablet 40 milliGRAM(s) Oral before breakfast  polyethylene glycol 3350 17 Gram(s) Oral every 12 hours  senna 2 Tablet(s) Oral at bedtime    MEDICATIONS  (PRN):  acetaminophen     Tablet .. 650 milliGRAM(s) Oral every 6 hours PRN Temp greater or equal to 38C (100.4F), Mild Pain (1 - 3)  melatonin 3 milliGRAM(s) Oral at bedtime PRN Insomnia      FAMILY HISTORY: No smoking, ETOH or illicit drug use    SOCIAL HISTORY: No smoking, ETOH or illicit drug use    Past Surgical History: see above    Allergies:  penicillin (Unknown)      REVIEW OF SYSTEMS:  CONSTITUTIONAL: No fever, weight loss, chills, shakes, or fatigue  RESPIRATORY: No cough, wheezing, hemoptysis, or shortness of breath  CARDIOVASCULAR: No chest pain, dyspnea, palpitations, dizziness, syncope, paroxysmal nocturnal dyspnea, orthopnea, or arm or leg swelling  GASTROINTESTINAL: No abdominal  or epigastric pain, nausea, vomiting, hematemesis, diarrhea, constipation, melena or bright red blood.  NEUROLOGICAL: No headaches, memory loss, slurred speech, limb weakness, loss of strength, numbness, or tremors  MUSCULOSKELETAL: No joint pain or swelling, muscle, back, or extremity pain      Vital Signs Last 24 Hrs  T(C): 36.6 (23 Oct 2024 12:13), Max: 36.7 (22 Oct 2024 21:13)  T(F): 97.6 (23 Oct 2024 11:30), Max: 98 (22 Oct 2024 21:13)  HR: 111 (23 Oct 2024 12:13) (104 - 114)  BP: 99/65 (23 Oct 2024 12:13) (91/64 - 99/68)  BP(mean): 79 (23 Oct 2024 12:13) (79 - 79)  RR: 18 (23 Oct 2024 12:13) (16 - 18)  SpO2: 95% (23 Oct 2024 12:13) (95% - 100%)    Parameters below as of 23 Oct 2024 05:20  Patient On (Oxygen Delivery Method): room air        PHYSICAL EXAM:  GENERAL: In no apparent distress, well nourished, and hydrated.  NECK: Supple, No JVD   HEART: Regular rate and rhythm; No murmurs, rubs, or gallops.  PULMONARY: Clear to auscultation and perfusion.  No rales, wheezing, or rhonchi bilaterally.  EXTREMITIES:  2+ Peripheral Pulses, no LE edema BL  NEUROLOGICAL: Grossly nonfocal      INTERPRETATION OF TELEMETRY: Not on telemetry    ECG:  < from: 12 Lead ECG (10.18.24 @ 04:56) >  Ventricular Rate 81 BPM    Atrial Rate 81 BPM    P-R Interval 150 ms    QRS Duration 66 ms    Q-T Interval 384 ms    QTC Calculation(Bazett) 446 ms    P Axis 62 degrees    R Axis 36 degrees    T Axis 43 degrees    Diagnosis Line Normal sinus rhythm  Low voltage QRS  Cannot rule out Anterior infarct , age undetermined  Abnormal ECG    Confirmed by RAE MORA MD ([id number]) on 10/18/2024 6:50:06 AM    < end of copied text >      I&O's Detail      LABS:                        8.7    4.63  )-----------( 116      ( 23 Oct 2024 06:06 )             28.0     10-23    132[L]  |  100  |  13  ----------------------------<  74  4.3   |  24  |  0.5[L]    Ca    7.3[L]      23 Oct 2024 06:06  Mg     2.1     10-23    TPro  6.7  /  Alb  1.8[L]  /  TBili  0.8  /  DBili  x   /  AST  31  /  ALT  5   /  AlkPhos  173[H]  10-23          Urinalysis Basic - ( 23 Oct 2024 06:06 )    Color: x / Appearance: x / SG: x / pH: x  Gluc: 74 mg/dL / Ketone: x  / Bili: x / Urobili: x   Blood: x / Protein: x / Nitrite: x   Leuk Esterase: x / RBC: x / WBC x   Sq Epi: x / Non Sq Epi: x / Bacteria: x      BNP  I&O's Detail    Daily Height in cm: 149.86 (23 Oct 2024 12:13)    Daily     RADIOLOGY & ADDITIONAL STUDIES: Patient is a 63y old  Female who presents with a chief complaint of Anemia (23 Oct 2024 10:29)    HPI: Patient is a 63yro female with PMHx of HTN, HLD, hypothyroidism, hx of lymphoma (in remission), and cardiac myxoma with resection (approx 2020) presenting with generalized weakness, dizziness, lightheadedness, and bilateral LE swelling. These symptoms have been ongoing for the past 2 years now, with episodes of syncope (last episode 3 mos ago), but the symptoms were worsening in the last two weeks along with a new R sided vision blurriness, intermittent headaches, and polydipsia. Today she went for a follow up with her cardiologist (Dr. Portillo), where it was found that her hgb was 7.5 and tachycardic, so she was sent to the ED. Patient denies any melena, hematochezia, N/V, or abdominal pain.  Of note, patient have been following with her hematologist/oncologist Dr. Ochoa for anemia workup in the past 2 years, including bone marrow biopsy, MRI, EGD/colonoscopy, and everything has been negative so far. Patient denies any SOB, chest pain, bowel changes, cough, recent travel, recent illnesses. In the ED, patient was initially febrile Tmax 101.9. Patient was given 1x aztreonam and vancomycin, 1L LR, and tylenol, with the resolution of her fever. Patient found to have subacute cerebellar infarct on MRI and EP consulted for ILR.      PAST MEDICAL & SURGICAL HISTORY:  Hypertension      Hyperlipemia      Hypothyroidism      History of lymphoma      Cardiac myxoma    PREVIOUS DIAGNOSTIC TESTING:      ECHO  FINDINGS:  < from: TTE Echo Complete w/o Contrast w/ Doppler (10.18.24 @ 12:31) >  Summary:   1. Normal global left ventricular systolic function.   2. Left ventricular ejection fraction, by visual estimation, is 55 to   60%.   3. Normal right ventricular size and function.   4. Mild tricuspid regurgitation.   5. Normal pulmonary artery pressure.    < end of copied text >         < from: Transesophageal Echocardiogram (10.23.24 @ 12:18) >  Summary:   1. Normal global left ventricular systolic function.   2. Left ventricular ejection fraction, by visual estimation, is 55 to   60%.   3. No left atrialappendage thrombus and normal left atrial appendage   velocities.   4. Normal left atrial and right atrial size.   5. Trace mitral valve regurgitation.   6. Color flow doppler and intravenous injection of agitated saline   demonstrates the presence of an intact intra atrial septum.   7. No evidence of recurrent cardiac myxoma.    < end of copied text >      STRESS  FINDINGS:    CATHETERIZATION  FINDINGS:    ELECTROPHYSIOLOGY STUDY  FINDINGS:    CAROTID ULTRASOUND:  FINDINGS    VENOUS DUPLEX SCAN:  FINDINGS:    CHEST CT PULMONARY ANGIO with IV Contrast:  FINDINGS:    MEDICATIONS  (STANDING):  aspirin enteric coated 81 milliGRAM(s) Oral daily  atorvastatin 80 milliGRAM(s) Oral at bedtime  chlorhexidine 2% Cloths 1 Application(s) Topical <User Schedule>  enoxaparin Injectable 30 milliGRAM(s) SubCutaneous every 24 hours  folic acid 5 milliGRAM(s) Oral daily  influenza   Vaccine 0.5 milliLiter(s) IntraMuscular once  iron sucrose IVPB 200 milliGRAM(s) IV Intermittent <User Schedule>  levothyroxine 50 MICROGram(s) Oral daily  metoprolol succinate ER 50 milliGRAM(s) Oral daily  pantoprazole    Tablet 40 milliGRAM(s) Oral before breakfast  polyethylene glycol 3350 17 Gram(s) Oral every 12 hours  senna 2 Tablet(s) Oral at bedtime    MEDICATIONS  (PRN):  acetaminophen     Tablet .. 650 milliGRAM(s) Oral every 6 hours PRN Temp greater or equal to 38C (100.4F), Mild Pain (1 - 3)  melatonin 3 milliGRAM(s) Oral at bedtime PRN Insomnia      FAMILY HISTORY: No smoking, ETOH or illicit drug use    SOCIAL HISTORY: No smoking, ETOH or illicit drug use    Past Surgical History: see above    Allergies:  penicillin (Unknown)      REVIEW OF SYSTEMS:  CONSTITUTIONAL: No fever, weight loss, chills, shakes, or fatigue  RESPIRATORY: No cough, wheezing, hemoptysis, or shortness of breath  CARDIOVASCULAR: No chest pain, dyspnea, palpitations, dizziness, syncope, paroxysmal nocturnal dyspnea, orthopnea, or arm or leg swelling  GASTROINTESTINAL: No abdominal  or epigastric pain, nausea, vomiting, hematemesis, diarrhea, constipation, melena or bright red blood.  NEUROLOGICAL: No headaches, memory loss, slurred speech, limb weakness, loss of strength, numbness, or tremors  MUSCULOSKELETAL: No joint pain or swelling, muscle, back, or extremity pain      Vital Signs Last 24 Hrs  T(C): 36.6 (23 Oct 2024 12:13), Max: 36.7 (22 Oct 2024 21:13)  T(F): 97.6 (23 Oct 2024 11:30), Max: 98 (22 Oct 2024 21:13)  HR: 111 (23 Oct 2024 12:13) (104 - 114)  BP: 99/65 (23 Oct 2024 12:13) (91/64 - 99/68)  BP(mean): 79 (23 Oct 2024 12:13) (79 - 79)  RR: 18 (23 Oct 2024 12:13) (16 - 18)  SpO2: 95% (23 Oct 2024 12:13) (95% - 100%)    Parameters below as of 23 Oct 2024 05:20  Patient On (Oxygen Delivery Method): room air        PHYSICAL EXAM:  GENERAL: In no apparent distress, well nourished, and hydrated.  NECK: Supple, No JVD   HEART: Regular rate and rhythm; No murmurs, rubs, or gallops.  PULMONARY: Clear to auscultation and perfusion.  No rales, wheezing, or rhonchi bilaterally.  EXTREMITIES:  2+ Peripheral Pulses, no LE edema BL  NEUROLOGICAL: Grossly nonfocal      INTERPRETATION OF TELEMETRY: Not on telemetry    ECG:  < from: 12 Lead ECG (10.18.24 @ 04:56) >  Ventricular Rate 81 BPM    Atrial Rate 81 BPM    P-R Interval 150 ms    QRS Duration 66 ms    Q-T Interval 384 ms    QTC Calculation(Bazett) 446 ms    P Axis 62 degrees    R Axis 36 degrees    T Axis 43 degrees    Diagnosis Line Normal sinus rhythm  Low voltage QRS  Cannot rule out Anterior infarct , age undetermined  Abnormal ECG    Confirmed by RAE MORA MD ([id number]) on 10/18/2024 6:50:06 AM    < end of copied text >      I&O's Detail      LABS:                        8.7    4.63  )-----------( 116      ( 23 Oct 2024 06:06 )             28.0     10-23    132[L]  |  100  |  13  ----------------------------<  74  4.3   |  24  |  0.5[L]    Ca    7.3[L]      23 Oct 2024 06:06  Mg     2.1     10-23    TPro  6.7  /  Alb  1.8[L]  /  TBili  0.8  /  DBili  x   /  AST  31  /  ALT  5   /  AlkPhos  173[H]  10-23          Urinalysis Basic - ( 23 Oct 2024 06:06 )    Color: x / Appearance: x / SG: x / pH: x  Gluc: 74 mg/dL / Ketone: x  / Bili: x / Urobili: x   Blood: x / Protein: x / Nitrite: x   Leuk Esterase: x / RBC: x / WBC x   Sq Epi: x / Non Sq Epi: x / Bacteria: x      BNP  I&O's Detail    Daily Height in cm: 149.86 (23 Oct 2024 12:13)    Daily     RADIOLOGY & ADDITIONAL STUDIES:

## 2024-10-23 NOTE — PROGRESS NOTE ADULT - SUBJECTIVE AND OBJECTIVE BOX
24H events:    Patient is a 63y old Female who presents with a chief complaint of Anemia (21 Oct 2024 10:02)    Primary diagnosis of Fever          Today is hospital day 6d.   This morning patient was seen and examined at bedside, resting comfortably in bed.    No acute or major events overnight.      PAST MEDICAL & SURGICAL HISTORY  Hypertension    Hyperlipemia    Hypothyroidism    History of lymphoma    Cardiac myxoma      SOCIAL HISTORY:  Social History:      ALLERGIES:  penicillin (Unknown)    MEDICATIONS:  STANDING MEDICATIONS  aspirin enteric coated 81 milliGRAM(s) Oral daily  atorvastatin 80 milliGRAM(s) Oral at bedtime  chlorhexidine 2% Cloths 1 Application(s) Topical <User Schedule>  enoxaparin Injectable 30 milliGRAM(s) SubCutaneous every 24 hours  folic acid 5 milliGRAM(s) Oral daily  influenza   Vaccine 0.5 milliLiter(s) IntraMuscular once  iron sucrose IVPB 200 milliGRAM(s) IV Intermittent <User Schedule>  lactated ringers. 1000 milliLiter(s) IV Continuous <Continuous>  levothyroxine 50 MICROGram(s) Oral daily  metoprolol succinate ER 50 milliGRAM(s) Oral daily  pantoprazole    Tablet 40 milliGRAM(s) Oral before breakfast  polyethylene glycol 3350 17 Gram(s) Oral every 12 hours  senna 2 Tablet(s) Oral at bedtime    PRN MEDICATIONS  acetaminophen     Tablet .. 650 milliGRAM(s) Oral every 6 hours PRN  melatonin 3 milliGRAM(s) Oral at bedtime PRN    VITALS:   T(F): 97.9  HR: 111  BP: 99/65  RR: 18  SpO2: 95%    PHYSICAL EXAM:  GENERAL: NAD, lying in bed comfortably  HEAD:  Atraumatic, Normocephalic  EYES: EOMI, PERRLA, conjunctiva and sclera clear  ENT: Moist mucous membranes  NECK: Supple, No JVD  CHEST/LUNG: Clear to auscultation bilaterally; No rales, rhonchi, wheezing, or rubs. Unlabored respirations  HEART: Regular rate and rhythm; No murmurs, rubs, or gallops  ABDOMEN: BSx4; Soft, nontender, nondistended  EXTREMITIES:  2+ Peripheral Pulses, brisk capillary refill. No clubbing, cyanosis, or edema  NERVOUS SYSTEM:  A&Ox3, no focal deficits   SKIN: No rashes or lesions      LABS:                        8.7    4.63  )-----------( 116      ( 23 Oct 2024 06:06 )             28.0     10-23    132[L]  |  100  |  13  ----------------------------<  74  4.3   |  24  |  0.5[L]    Ca    7.3[L]      23 Oct 2024 06:06  Mg     2.1     10-23    TPro  6.7  /  Alb  1.8[L]  /  TBili  0.8  /  DBili  x   /  AST  31  /  ALT  5   /  AlkPhos  173[H]  10-23      Urinalysis Basic - ( 23 Oct 2024 06:06 )    Color: x / Appearance: x / SG: x / pH: x  Gluc: 74 mg/dL / Ketone: x  / Bili: x / Urobili: x   Blood: x / Protein: x / Nitrite: x   Leuk Esterase: x / RBC: x / WBC x   Sq Epi: x / Non Sq Epi: x / Bacteria: x            Culture - CSF with Gram Stain (collected 21 Oct 2024 15:15)  Source: .CSF CSF  Gram Stain (21 Oct 2024 22:33):    No polymorphonuclear cells seen    No organisms seen    by cytocentrifuge  Preliminary Report (22 Oct 2024 14:54):    No growth    Culture - Fungal, CSF (collected 21 Oct 2024 15:15)  Source: .CSF CSF  Preliminary Report (23 Oct 2024 08:09):    No growth    Culture - Acid Fast - CSF (collected 21 Oct 2024 15:15)  Source: .CSF CSF

## 2024-10-23 NOTE — CHART NOTE - NSCHARTNOTEFT_GEN_A_CORE
POST OPERATIVE PROCEDURAL DOCUMENTATION  PRE-OP DIAGNOSIS: CVA    PROCEDURE: Transesophageal Echocardiogram     Primary Physician: Dr. Hill  Cardiology Fellow: Zoila    Anesthesia:  Sedation as per documentation from anesthesia    CONDITION  [  ] Critical  [  ] Serious  [  ] Fair  [ x ] Good      ESTIMATED BLOOD LOSS: None    COMPLICATIONS: None    After risks and benefits of procedures were explained, informed consent was obtained and placed in chart.  Sedation as per documentation from anesthesia. The VILMA probe was passed into the esophagus without difficulty.  Transesophageal images were obtained.  The VILMA probe was removed without difficulty and examined.  There was no evidence for bleeding.  The patient tolerated the procedure well without any immediate VILMA-related complications.      Preliminary Findings:  LA: Mildly enlarged  JAIRO: Left atrial appendage was clear of clot and smoke. Normal doppler velocities   LV: LVEF was estimated at 55-65%  MV: No MR, No MS  AV: Trace AI, no  AS  RA: Mildly enlarged  RV: Normal size and function  TV: Trace TR  PV: No GA, no PS  IAS: No R-> L shunt by color doppler and injection of agitated saline    DIAGNOSIS/IMPRESSION: No cardioembolic source of CVA identified    Full report to follow    PLAN OF CARE:  [x]Return to inpatient bed when stable and fully awake.  [x] No eating or drinking for 1 hour    Results of procedure/ plan of care discussed with patient/  in detail.

## 2024-10-23 NOTE — PROGRESS NOTE ADULT - ASSESSMENT
63yro female with PMHx of HTN, HLD, hypothyroidism, hx of lymphoma (in remission), and cardiac myxoma with resection (approx 2020) presenting with fever, generalized weakness, dizziness, lightheadedness.    # Brain MRI showing nonenhancing lesions  # r/o CNS disease, demyelinating disease, paraneoplastic VS others   - MRI Patchy lesion in the inferior right cerebellum with petechial hemorrhage may represent subacute infarct. started ASA and Lipitor per Neuro   - Multiple additional nonenhancing lesions throughout the bilateral periventricular white matters, splenium of the corpus callosum, and right urmila  - LP performed 10/21 and samples sent  - f/u CSF studies Cell count, glucose, protein, LDH, Acid fast culture, fungal cultures, Crypto, EBV, Gram stain, CSF PCR, HSV, Histo, ROBI, Lyme, Borrelia, VDRL, West Nile, ACE (CSF and serum), IgG index, Autoimmune encephelopathy panel (serum and CSF), MOG, Myelin basic protein, Oligoclonal bands, Flow Cytometry, Cytopathology, Paraneoplastic (CSF and serum).  - f/u ESR, CRP, CARLA, Anti ds-DNA, Complement levels C3/C4, RF, ANCA, Sjogrens (SS-A, SS-B), ACE, TSH/T4, SPEP, Lyme/Borrelia screen, Syphilis,   - f/u Anti-smith Ab, Anti-histone Antibodies, Anti-RNP Antibodies, Cryoglobulins   - f/u HIV, EBV PCR, CMV PCR, RPR, Hepatitis panel,     - CT A/P showed several hepatic hypodensities 0.7 cm, splenic hypodensity lesion measuring 2.3 cm   - f/u MRI C and T spine w/ and w/o to ensure no other lesions  - CTA of head and neck negative for pathology    # Acute on Chronic normocytic anemia, symptomatic   - no evidence of bleeding, SHAY neg  - hbg responded to 2 u prbc  - no evidence of hemolysis  - B12 WNL. low folate  - c/w folic acid   - c/w Venofer (today day 4 of 5)  - f/u Dr. Ochoa as hematology/Oncology    # Fever  # Weakness  # R sided vision changes  # Hx of non hodgkin's lymphoma  # Hx of cardiac myxoma s/p resection   - still have intermittent fever, last on 10/21 evening   - RVP negative  - BCx and UA negative  - per ID High suspicion for malignancy as monocytosis, lung findings, splenic/liver/brain findings, elevated ferritin  - Monitor off antimicrobials   - malignancy workup  - Right lower lobe nodular, f/u in 3 mo w Pulmonology   - f/u Ophthalmology consult  - f/u VILMA for workup of subacute strokes and hx of cardiac myxoma    # Hypothyroid  - c/w synthroid  - f/u TSH and T4    # HTN   # HLD  - c/w metoprolol and atorvastatin      # DVT prophylaxis: Lovenox  # GI prophylaxis: PPI  # Diet: Regular  # Activity: as tolerated  # Code status: Full Code  # Disposition: acute    Pending: LP labs, MR C spine and T spine, opthomology eval, VILMA 63yro female with PMHx of HTN, HLD, hypothyroidism, hx of lymphoma (in remission), and cardiac myxoma with resection (approx 2020) presenting with fever, generalized weakness, dizziness, lightheadedness.    # Brain MRI showing nonenhancing lesions  # r/o CNS disease, demyelinating disease, paraneoplastic VS others   - MRI Patchy lesion in the inferior right cerebellum with petechial hemorrhage may represent subacute infarct. started ASA and Lipitor per Neuro   - Multiple additional nonenhancing lesions throughout the bilateral periventricular white matters, splenium of the corpus callosum, and right urmila  - LP performed 10/21.  - f/u EBV, Histo, ROBI, Lyme, Borrelia, VDRL, West Nile, ACE (CSF and serum), IgG index, Autoimmune encephelopathy panel (serum and CSF), MOG, Myelin basic protein, Oligoclonal bands, Flow Cytometry, Cytopathology, Paraneoplastic (CSF and serum).  -  CSF glucose 41, CSF protein 187. No neutrophils or organisms appreciated on CSF gram stain. No growth on CSF culture. CSF PCR negative.  - LDH 2.5, AFB unable to be performed, fungal cultures no growth, crypto negative, HSV negative  - f/u CALRA, Complement level C3/C4, ANCA, ACE, SPEP, Lyme/Borrelia screen, Syphilis,   - , , CARLA , Anti ds-DNA <1, C3/C4 , RF 10, ANCA , SS-A/SS-B <0.2, ACE , TSH 5.68, T4 4.8, SPEP   - f/u Anti-histone Ab, cryoglobulins   - Anti-hardy Ab <0.2, Anti-histone Ab , Anti-RNP Ab <0.2, Cryoglobulins  - f/u EBV PCR  - HIV negative, EBV PCR , CMV PCR negative, RPR negative, hepatitis panel nonreactive  - CT A/P showed several hepatic hypodensities 0.7 cm, splenic hypodensity lesion measuring 2.3 cm   - f/u MRI C and T spine w/ and w/o to ensure no other lesions  - CTA of head and neck negative for pathology    # Acute on Chronic normocytic anemia, symptomatic   - no evidence of bleeding, SHAY neg  - hbg responded to 2 u prbc  - no evidence of hemolysis  - B12 WNL. low folate  - c/w folic acid   - c/w Venofer (today day 4 of 5)  - f/u Dr. Ochoa as hematology/Oncology    # Fever  # Weakness  # R sided vision changes  # Hx of non hodgkin's lymphoma  # Hx of cardiac myxoma s/p resection   - still have intermittent fever, last on 10/21 evening   - RVP negative  - BCx and UA negative  - per ID High suspicion for malignancy as monocytosis, lung findings, splenic/liver/brain findings, elevated ferritin  - Monitor off antimicrobials   - malignancy workup  - Right lower lobe nodular, f/u in 3 mo w Pulmonology   - f/u Ophthalmology consult  - f/u VILMA for workup of subacute strokes and hx of cardiac myxoma    # Hypothyroid  - c/w synthroid  - TSH 5.68, T4 4.8    # HTN   # HLD  - c/w metoprolol and atorvastatin      # DVT prophylaxis: Lovenox  # GI prophylaxis: PPI  # Diet: Regular  # Activity: as tolerated  # Code status: Full Code  # Disposition: acute    Pending: LP labs, MR C spine and T spine, opthomology eval, VILMA 63yro female with PMHx of HTN, HLD, hypothyroidism, hx of lymphoma (in remission), and cardiac myxoma with resection (approx 2020) presenting with fever, generalized weakness, dizziness, lightheadedness.    # Brain MRI showing nonenhancing lesions  # r/o CNS disease, demyelinating disease, paraneoplastic VS others   - MRI Patchy lesion in the inferior right cerebellum with petechial hemorrhage may represent subacute infarct. started ASA and Lipitor per Neuro   - Multiple additional nonenhancing lesions throughout the bilateral periventricular white matters, splenium of the corpus callosum, and right urmila  - LP performed 10/21.  - f/u EBV, Histo, ROBI, Lyme, Borrelia, VDRL, West Nile, ACE (CSF and serum), IgG index, Autoimmune encephelopathy panel (serum and CSF), MOG, Myelin basic protein, Oligoclonal bands, Flow Cytometry, Cytopathology, Paraneoplastic (CSF and serum).  -  CSF glucose 41, CSF protein 187. No neutrophils or organisms appreciated on CSF gram stain. No growth on CSF culture. CSF PCR negative.  - CSF , AFB unable to be performed, fungal cultures no growth, crypto negative, HSV negative  - f/u CARLA, Complement level C3/C4, ACE, SPEP, Lyme/Borrelia screen, Syphilis,   - , , CARLA , Anti ds-DNA <1, C3/C4 , RF 10, p-ANCA/c-ANCA negative, a-ANCA indeterminate, SS-A/SS-B <0.2, ACE , TSH 5.68, T4 4.8, SPEP   - f/u Anti-histone Ab, cryoglobulins   - Anti-hardy Ab <0.2, Anti-histone Ab , Anti-RNP Ab <0.2, Cryoglobulins  - f/u EBV PCR  - HIV negative, EBV PCR , CMV PCR negative, RPR negative, hepatitis panel nonreactive  - CT A/P showed several hepatic hypodensities 0.7 cm, splenic hypodensity lesion measuring 2.3 cm   - MRI C and T spine w/ and w/o shows pontine and right cerebellar signal abnormalities, multilevel spondylosis and spondylolisthesis most prominent at C4-5 with spinal and left foraminal stenosis. 8mm right pulmonary nodule in right lower lobe appreciated.   - CTA of head and neck negative for pathology    # Acute on Chronic normocytic anemia, symptomatic   - no evidence of bleeding, SHAY neg  - hbg responded to 2 u prbc  - no evidence of hemolysis  - B12 WNL. low folate  - c/w folic acid   - c/w Venofer (today day 4 of 5)  - f/u Dr. Ochoa as hematology/Oncology    # Fever  # Weakness  # R sided vision changes  # Hx of non hodgkin's lymphoma  # Hx of cardiac myxoma s/p resection   - still have intermittent fever, last on 10/21 evening   - RVP negative  - BCx and UA negative  - per ID High suspicion for malignancy as monocytosis, lung findings, splenic/liver/brain findings, elevated ferritin  - Monitor off antimicrobials   - malignancy workup  - Right lower lobe nodular, f/u in 3 mo w Pulmonology   - per Ophthalmology, anticoagulation use minimized during hospital course due to extensive subretinal fluid and subretinal hemorrhages with para macular exudates in right eye suggestive of neovascular AMD process. Patient also has retinal hemorrhages, peripheral and temporal, in left eye. Recommends hematology consult due to retinal hemorrhages and low platelets.  - VILMA completed for workup of subacute strokes and hx of cardiac myxoma. Preliminary report shows no cardioembolic source of CVA. F/u final result.    # Hypothyroid  - c/w synthroid  - TSH 5.68, T4 4.8    # HTN   # HLD  - c/w metoprolol and atorvastatin      # DVT prophylaxis: Lovenox  # GI prophylaxis: PPI  # Diet: Regular  # Activity: as tolerated  # Code status: Full Code  # Disposition: acute    Pending: LP labs, MR C spine and T spine, opthomology eval, VILMA 63yro female with PMHx of HTN, HLD, hypothyroidism, hx of lymphoma (in remission), and cardiac myxoma with resection (approx 2020) presenting with fever, generalized weakness, dizziness, lightheadedness.    # Brain MRI showing nonenhancing lesions  # r/o CNS disease, demyelinating disease, paraneoplastic VS others   - MRI Patchy lesion in the inferior right cerebellum with petechial hemorrhage may represent subacute infarct. started ASA and Lipitor per Neuro   - Multiple additional nonenhancing lesions throughout the bilateral periventricular white matters, splenium of the corpus callosum, and right urmila  - LP performed 10/21.  - f/u EBV, Histo, ROBI, Lyme, Borrelia, VDRL, West Nile, ACE (CSF and serum), IgG index, Autoimmune encephelopathy panel (serum and CSF), MOG, Myelin basic protein, Oligoclonal bands, Flow Cytometry, Cytopathology, Paraneoplastic (CSF and serum).  - CSF glucose 41, CSF protein 187. No neutrophils or organisms appreciated on CSF gram stain. No growth on CSF culture. CSF PCR negative.  - CSF , AFB unable to be performed, fungal cultures no growth, crypto negative, HSV negative  - f/u CARLA, Complement level C3/C4, ACE, SPEP, Lyme/Borrelia screen, Syphilis,   - , , CARLA , Anti ds-DNA <1, C3/C4 , RF 10, p-ANCA/c-ANCA negative, a-ANCA indeterminate, SS-A/SS-B <0.2, ACE , TSH 5.68, T4 4.8, SPEP   - f/u Anti-histone Ab, cryoglobulins   - Anti-hardy Ab <0.2, Anti-histone Ab , Anti-RNP Ab <0.2, Cryoglobulins  - f/u EBV PCR  - HIV negative, EBV PCR , CMV PCR negative, RPR negative, hepatitis panel nonreactive  - CT A/P showed several hepatic hypodensities 0.7 cm, splenic hypodensity lesion measuring 2.3 cm   - MRI C and T spine w/ and w/o shows pontine and right cerebellar signal abnormalities, multilevel spondylosis and spondylolisthesis most prominent at C4-5 with spinal and left foraminal stenosis. 8mm right pulmonary nodule in right lower lobe appreciated.   - CTA of head and neck negative for pathology    # Acute on Chronic normocytic anemia, symptomatic   - no evidence of bleeding, SAHY neg  - hbg responded to 2 u prbc  - no evidence of hemolysis  - B12 WNL. low folate  - c/w folic acid   - c/w Venofer (today day 4 of 5)  - f/u Dr. Ochoa as hematology/Oncology    # Fever  # Weakness  # R sided vision changes  # Hx of non hodgkin's lymphoma  # Hx of cardiac myxoma s/p resection   - still have intermittent fever, last on 10/21 evening   - RVP negative  - BCx and UA negative  - per ID High suspicion for malignancy as monocytosis, lung findings, splenic/liver/brain findings, elevated ferritin  - Monitor off antimicrobials   - malignancy workup  - Right lower lobe nodular, f/u in 3 mo w Pulmonology   - per Ophthalmology, anticoagulation use minimized during hospital course due to extensive subretinal fluid and subretinal hemorrhages with para macular exudates in right eye suggestive of neovascular AMD process. Patient also has retinal hemorrhages, peripheral and temporal, in left eye. Recommends hematology consult due to retinal hemorrhages and low platelets.  - VILMA completed for workup of subacute strokes and hx of cardiac myxoma. Preliminary report shows no cardioembolic source of CVA. F/u final result.  - EP consult placed for ILR placement for possible Afib.  - Hematology/oncology consult placed for evaluation of possible lymphoma considering negative workup and history of lymphoma.    # Hypothyroid  - c/w synthroid  - TSH 5.68, T4 4.8    # HTN   # HLD  - c/w metoprolol and atorvastatin      # DVT prophylaxis: Lovenox  # GI prophylaxis: PPI  # Diet: Regular  # Activity: as tolerated  # Code status: Full Code  # Disposition: acute    Pending: LP labs, MR C spine and T spine, opthomology eval, VILMA 63yro female with PMHx of HTN, HLD, hypothyroidism, hx of lymphoma (in remission), and cardiac myxoma with resection (approx 2020) presenting with fever, generalized weakness, dizziness, lightheadedness.    # Brain MRI showing nonenhancing lesions  # r/o CNS disease, demyelinating disease, paraneoplastic VS others   - MRI Patchy lesion in the inferior right cerebellum with petechial hemorrhage may represent subacute infarct. started ASA and Lipitor per Neuro   - Multiple additional nonenhancing lesions throughout the bilateral periventricular white matters, splenium of the corpus callosum, and right urmila  - LP performed 10/21.  - f/u EBV, Histo, ROBI, Lyme, Borrelia, VDRL, West Nile, ACE (CSF and serum), IgG index, Autoimmune encephelopathy panel (serum and CSF), MOG, Myelin basic protein, Oligoclonal bands, Flow Cytometry, Cytopathology, Paraneoplastic (CSF and serum).  - CSF glucose 41, CSF protein 187. No neutrophils or organisms appreciated on CSF gram stain. No growth on CSF culture. CSF PCR negative.  - CSF , AFB unable to be performed, fungal cultures no growth, crypto negative, HSV negative  - f/u CARLA, Complement level C3/C4, ACE, SPEP, Lyme/Borrelia screen, Syphilis,   - , , CARLA , Anti ds-DNA <1, C3/C4 , RF 10, p-ANCA/c-ANCA negative, a-ANCA indeterminate, SS-A/SS-B <0.2, ACE , TSH 5.68, T4 4.8, SPEP   - f/u Anti-histone Ab, cryoglobulins   - Anti-hardy Ab <0.2, Anti-histone Ab , Anti-RNP Ab <0.2, Cryoglobulins  - f/u EBV PCR  - HIV negative, EBV PCR , CMV PCR negative, RPR negative, hepatitis panel nonreactive  - CT A/P showed several hepatic hypodensities 0.7 cm, splenic hypodensity lesion measuring 2.3 cm   - MRI C and T spine w/ and w/o shows pontine and right cerebellar signal abnormalities, multilevel spondylosis and spondylolisthesis most prominent at C4-5 with spinal and left foraminal stenosis. 8mm right pulmonary nodule in right lower lobe appreciated.   - CTA of head and neck negative for pathology    # Acute on Chronic normocytic anemia, symptomatic   - no evidence of bleeding, SHAY neg  - hbg responded to 2 u prbc  - no evidence of hemolysis  - B12 WNL. low folate  - c/w folic acid   - c/w Venofer (today day 4 of 5)  - f/u Dr. Ochoa as hematology/Oncology    # Fever  # Weakness  # R sided vision changes  # Hx of non hodgkin's lymphoma  # Hx of cardiac myxoma s/p resection   - still have intermittent fever, last on 10/21 evening   - RVP negative  - BCx and UA negative  - per ID High suspicion for malignancy as monocytosis, lung findings, splenic/liver/brain findings, elevated ferritin  - Monitor off antimicrobials   - malignancy workup  - Right lower lobe nodular, f/u in 3 mo w Pulmonology   - per Ophthalmology, anticoagulation use minimized during hospital course due to extensive subretinal fluid and subretinal hemorrhages with para macular exudates in right eye suggestive of neovascular AMD process. Patient also has retinal hemorrhages, peripheral and temporal, in left eye. Recommends hematology consult due to retinal hemorrhages and low platelets.  - VILMA completed for workup of subacute strokes and hx of cardiac myxoma. Preliminary report shows no cardioembolic source of CVA. F/u final result.  - EP consult placed for ILR placement for possible Afib.  - Hematology/oncology consult placed for evaluation of possible lymphoma considering negative workup and history of lymphoma.    # Hypothyroid  - c/w synthroid  - TSH 5.68, T4 4.8    # HTN   # HLD  - c/w metoprolol and atorvastatin      # DVT prophylaxis: Lovenox  # GI prophylaxis: PPI  # Diet: Regular  # Activity: as tolerated  # Code status: Full Code  # Disposition: acute    Pending: LP labs, EP for ILR placement, heme/onc consult

## 2024-10-23 NOTE — CONSULT NOTE ADULT - ASSESSMENT
63yro female with PMHx of HTN, HLD, hypothyroidism, hx of lymphoma (in remission), and cardiac myxoma with resection (approx 2020) presenting with fever, generalized weakness, dizziness, lightheadedness.    Impression:  Subacute CVA  Nonenhancing brain lesions  Acute on Chronic normocytic anemia, symptomatic  Fever, resolved  Weakness  R sided vision changes  Hx of non hodgkin's lymphoma  Hx of cardiac myxoma s/p resection  HTN  HLD    Plan:  - Discussed the option of ILR with patient and family, At this time they would like to think about it  - Would recommend to keep patient on telemetry if concerned for arrhythmia  - Monitor electrolytes, maintain WNL  - Will follow

## 2024-10-24 LAB
ACE SERPL-CCNC: 91 U/L — HIGH (ref 14–82)
ALBUMIN CSF-MCNC: 82.1 MG/DL — HIGH (ref 14–25)
ALBUMIN SERPL ELPH-MCNC: 1409 MG/DL — LOW (ref 3500–5200)
ANA PAT FLD IF-IMP: ABNORMAL
ANA TITR SER: ABNORMAL
AT III ACT/NOR PPP CHRO: 57 % — LOW (ref 85–135)
B BURGDOR C6 AB SER-ACNC: NEGATIVE — SIGNIFICANT CHANGE UP
B BURGDOR DNA SPEC QL NAA+PROBE: NEGATIVE — SIGNIFICANT CHANGE UP
B BURGDOR IGG+IGM SER-ACNC: 0.21 INDEX — SIGNIFICANT CHANGE UP (ref 0.01–0.9)
B2 GLYCOPROT1 IGA SER QL: <2 U/ML — SIGNIFICANT CHANGE UP
B2 GLYCOPROT1 IGG SER-ACNC: <1.4 U/ML — SIGNIFICANT CHANGE UP
B2 GLYCOPROT1 IGM SER-ACNC: <1.5 U/ML — SIGNIFICANT CHANGE UP
CARDIOLIPIN IGM SER-MCNC: <1.5 MPL U/ML — SIGNIFICANT CHANGE UP
CARDIOLIPIN IGM SER-MCNC: <1.6 GPL U/ML — SIGNIFICANT CHANGE UP
CERULOPLASMIN SERPL-MCNC: 32 MG/DL — SIGNIFICANT CHANGE UP (ref 16–45)
DEPRECATED CARDIOLIPIN IGA SER: <2 APL U/ML — SIGNIFICANT CHANGE UP
HCYS SERPL-MCNC: 15.2 UMOL/L — HIGH
HCYS SERPL-MCNC: 15.7 UMOL/L — HIGH
IGG CSF-MCNC: 60.5 MG/DL — HIGH
IGG FLD-MCNC: 2778 MG/DL — HIGH (ref 610–1660)
IGG SYNTH RATE SER+CSF CALC-MRATE: -57.2 MG/DAY — SIGNIFICANT CHANGE UP
IGG/ALB CLEAR SER+CSF-RTO: 0.4 — SIGNIFICANT CHANGE UP
IGG/ALB CSF: 0.74 RATIO — HIGH
IGG/ALB SER: 1.97 RATIO — SIGNIFICANT CHANGE UP
N-METHYL-DA RECEPTOR AB IGG BY CBA-IFA, SERUM W/RFLX TITER: SIGNIFICANT CHANGE UP
T PALLIDUM AB TITR SER: NEGATIVE — SIGNIFICANT CHANGE UP

## 2024-10-24 PROCEDURE — 99233 SBSQ HOSP IP/OBS HIGH 50: CPT

## 2024-10-24 RX ADMIN — POLYETHYLENE GLYCOL 3350 17 GRAM(S): 17 POWDER, FOR SOLUTION ORAL at 05:42

## 2024-10-24 RX ADMIN — Medication 30 MILLIGRAM(S): at 12:26

## 2024-10-24 RX ADMIN — FOLIC ACID 5 MILLIGRAM(S): 1 TABLET ORAL at 12:25

## 2024-10-24 RX ADMIN — PANTOPRAZOLE SODIUM 40 MILLIGRAM(S): 40 TABLET, DELAYED RELEASE ORAL at 05:42

## 2024-10-24 RX ADMIN — CHLORHEXIDINE GLUCONATE 1 APPLICATION(S): 40 SOLUTION TOPICAL at 05:42

## 2024-10-24 RX ADMIN — Medication 50 MICROGRAM(S): at 05:42

## 2024-10-24 RX ADMIN — Medication 650 MILLIGRAM(S): at 09:40

## 2024-10-24 RX ADMIN — Medication 650 MILLIGRAM(S): at 08:46

## 2024-10-24 RX ADMIN — Medication 81 MILLIGRAM(S): at 12:26

## 2024-10-24 RX ADMIN — Medication 80 MILLIGRAM(S): at 21:28

## 2024-10-24 RX ADMIN — Medication 50 MILLIGRAM(S): at 05:41

## 2024-10-24 NOTE — PROGRESS NOTE ADULT - SUBJECTIVE AND OBJECTIVE BOX
24H events:    Patient is a 63y old Female who presents with a chief complaint of Anemia (21 Oct 2024 10:02)    Primary diagnosis of Fever          Today is hospital day 7d.   This morning patient was seen and examined at bedside, resting comfortably in bed.    No acute or major events overnight.      PAST MEDICAL & SURGICAL HISTORY  Hypertension    Hyperlipemia    Hypothyroidism    History of lymphoma    Cardiac myxoma      SOCIAL HISTORY:  Social History:      ALLERGIES:  penicillin (Unknown)    MEDICATIONS:  STANDING MEDICATIONS  aspirin enteric coated 81 milliGRAM(s) Oral daily  atorvastatin 80 milliGRAM(s) Oral at bedtime  chlorhexidine 2% Cloths 1 Application(s) Topical <User Schedule>  enoxaparin Injectable 30 milliGRAM(s) SubCutaneous every 24 hours  folic acid 5 milliGRAM(s) Oral daily  influenza   Vaccine 0.5 milliLiter(s) IntraMuscular once  levothyroxine 50 MICROGram(s) Oral daily  metoprolol succinate ER 50 milliGRAM(s) Oral daily  pantoprazole    Tablet 40 milliGRAM(s) Oral before breakfast  polyethylene glycol 3350 17 Gram(s) Oral every 12 hours  senna 2 Tablet(s) Oral at bedtime    PRN MEDICATIONS  acetaminophen     Tablet .. 650 milliGRAM(s) Oral every 6 hours PRN  melatonin 3 milliGRAM(s) Oral at bedtime PRN    VITALS:   T(F): 94.4  HR: 85  BP: 104/69  RR: 18  SpO2: 97%    PHYSICAL EXAM:  GENERAL: NAD, lying in bed comfortably  HEAD:  Atraumatic, Normocephalic  EYES: EOMI, PERRLA, conjunctiva and sclera clear  ENT: Moist mucous membranes  NECK: Supple, No JVD  CHEST/LUNG: Clear to auscultation bilaterally; No rales, rhonchi, wheezing, or rubs. Unlabored respirations  HEART: Regular rate and rhythm; No murmurs, rubs, or gallops  ABDOMEN: BSx4; Soft, nontender, nondistended  EXTREMITIES:  2+ Peripheral Pulses, brisk capillary refill. No clubbing, cyanosis, or edema  NERVOUS SYSTEM:  A&Ox3, no focal deficits   SKIN: No rashes or lesions      LABS:                        8.7    4.63  )-----------( 116      ( 23 Oct 2024 06:06 )             28.0     10-23    132[L]  |  100  |  13  ----------------------------<  74  4.3   |  24  |  0.5[L]    Ca    7.3[L]      23 Oct 2024 06:06  Mg     2.1     10-23    TPro  6.7  /  Alb  1.8[L]  /  TBili  0.8  /  DBili  x   /  AST  31  /  ALT  5   /  AlkPhos  173[H]  10-23      Urinalysis Basic - ( 23 Oct 2024 06:06 )    Color: x / Appearance: x / SG: x / pH: x  Gluc: 74 mg/dL / Ketone: x  / Bili: x / Urobili: x   Blood: x / Protein: x / Nitrite: x   Leuk Esterase: x / RBC: x / WBC x   Sq Epi: x / Non Sq Epi: x / Bacteria: x            Culture - CSF with Gram Stain (collected 21 Oct 2024 15:15)  Source: .CSF CSF  Gram Stain (21 Oct 2024 22:33):    No polymorphonuclear cells seen    No organisms seen    by cytocentrifuge  Preliminary Report (22 Oct 2024 14:54):    No growth    Culture - Fungal, CSF (collected 21 Oct 2024 15:15)  Source: .CSF CSF  Preliminary Report (24 Oct 2024 11:27):    No growth    Culture - Acid Fast - CSF (collected 21 Oct 2024 15:15)  Source: .CSF CSF  Preliminary Report (23 Oct 2024 23:07):    Culture is being performed.

## 2024-10-24 NOTE — PROGRESS NOTE ADULT - ASSESSMENT
63yro female with PMHx of HTN, HLD, hypothyroidism, hx of lymphoma (in remission), and cardiac myxoma with resection (approx 2020) presenting with fever, generalized weakness, dizziness, lightheadedness.    # Brain MRI showing nonenhancing lesions  # r/o CNS disease, demyelinating disease, paraneoplastic VS others   - MRI Patchy lesion in the inferior right cerebellum with petechial hemorrhage may represent subacute infarct. started ASA and Lipitor per Neuro   - Multiple additional nonenhancing lesions throughout the bilateral periventricular white matters, splenium of the corpus callosum, and right urmila  - LP performed 10/21.  - f/u EBV, Histo, ROBI, Lyme, Borrelia, VDRL, West Nile, ACE (CSF and serum), IgG index, Autoimmune encephelopathy panel (serum and CSF), MOG, Myelin basic protein, Oligoclonal bands, Flow Cytometry, Cytopathology, Paraneoplastic (CSF and serum).  - CSF glucose 41, CSF protein 187. No neutrophils or organisms appreciated on CSF gram stain. No growth on CSF culture. CSF PCR negative.  - CSF , AFB unable to be performed, fungal cultures no growth, crypto negative, HSV negative  - f/u CARLA, Complement level C3/C4, ACE, SPEP, Lyme/Borrelia screen, Syphilis,   - , , CARLA , Anti ds-DNA <1, C3/C4 , RF 10, p-ANCA/c-ANCA negative, a-ANCA indeterminate, SS-A/SS-B <0.2, ACE , TSH 5.68, T4 4.8, SPEP   - f/u Anti-histone Ab, cryoglobulins   - Anti-hardy Ab <0.2, Anti-histone Ab , Anti-RNP Ab <0.2, Cryoglobulins  - f/u EBV PCR  - HIV negative, EBV PCR , CMV PCR negative, RPR negative, hepatitis panel nonreactive  - CT A/P showed several hepatic hypodensities 0.7 cm, splenic hypodensity lesion measuring 2.3 cm   - MRI C and T spine w/ and w/o shows pontine and right cerebellar signal abnormalities, multilevel spondylosis and spondylolisthesis most prominent at C4-5 with spinal and left foraminal stenosis. 8mm right pulmonary nodule in right lower lobe appreciated.   - CTA of head and neck negative for pathology    # Acute on Chronic normocytic anemia, symptomatic   - no evidence of bleeding, SHAY neg  - hbg responded to 2 u prbc  - no evidence of hemolysis  - B12 WNL. low folate  - c/w folic acid   - c/w Venofer (today day 5 of 5)  - f/u Dr. Ochoa as hematology/Oncology    # Fever  # Weakness  # R sided vision changes  # Hx of non hodgkin's lymphoma  # Hx of cardiac myxoma s/p resection   - still have intermittent fever, last on 10/21 evening   - RVP negative  - BCx and UA negative  - per ID High suspicion for malignancy as monocytosis, lung findings, splenic/liver/brain findings, elevated ferritin  - Monitor off antimicrobials   - malignancy workup  - Right lower lobe nodular, f/u in 3 mo w Pulmonology   - per Ophthalmology, anticoagulation use minimized during hospital course due to extensive subretinal fluid and subretinal hemorrhages with para macular exudates in right eye suggestive of neovascular AMD process. Patient also has retinal hemorrhages, peripheral and temporal, in left eye. Recommends hematology consult due to retinal hemorrhages and low platelets.  - VILMA completed for workup of subacute strokes and hx of cardiac myxoma. Report shows no cardioembolic source of CVA.   - EP consult placed for ILR placement for possible Afib. Family is deferring decision.  - f/u heme/onc consult    # Hypothyroid  - c/w synthroid  - TSH 5.68, T4 4.8    # HTN   # HLD  - c/w metoprolol and atorvastatin      # DVT prophylaxis: Lovenox  # GI prophylaxis: PPI  # Diet: Regular  # Activity: as tolerated  # Code status: Full Code  # Disposition: acute    Pending: heme/onc consult

## 2024-10-24 NOTE — CONSULT NOTE ADULT - SUBJECTIVE AND OBJECTIVE BOX
Patient is a 63y old  Female who presents with a chief complaint of Anemia (23 Oct 2024 16:05)      HPI:  Patient is a 63yro female with PMHx of HTN, HLD, hypothyroidism, hx of lymphoma (in remission), and cardiac myxoma with resection (approx ) presenting with generalized weakness, dizziness, lightheadedness, and bilateral LE swelling. These symptoms have been ongoing for the past 2 years now, with episodes of syncope (last episode 3 mos ago), but the symptoms were worsening in the last two weeks along with a new R sided vision blurriness, intermittent headaches, and polydipsia. Today she went for a follow up with her cardiologist (Dr. Portillo), where it was found that her hgb was 7.5 and tachycardic, so she was sent to the ED. Patient denies any melena, hematochezia, N/V, or abdominal pain.   Of note, patient have been following with her hematologist/oncologist Dr. Ochoa for anemia workup in the past 2 years, including bone marrow biopsy, MRI, EGD/colonoscopy, and everything has been negative so far.  Patient denies any SOB, chest pain, bowel changes, cough, recent travel, recent illnesses.     In the ED, patient was initially febrile (Tmax 101.9), /65, , Sat 96%, RR 18  Patient was given 1x aztreonam and vancomycin, 1L LR, and tylenol, with the resolution of her fever.  Patient admitted for management of anemia.     Labs significant for:                 6.6    4.96  )-----------( 147      ( 10-17-24 @ 21:50 )              22.3     131  |  99  |  10  -------------------------<  115   10-17-24 @ 21:50  4.1  |  25  |  0.7    Ca      7.3     10-17-24 @ 21:50  Mg     1.9     10-17-24 @ 21:50    TPro  7.2  /  Alb  2.1  /  TBili  0.5  /  DBili  x   /  AST  29  /  ALT  <5  /  AlkPhos  112  /  GGT  x     10-17-24 @ 21:50    ProBNP 481  Trop 10  Lactate 2.5    UA -ve    RVP -ve     Imaging:  CXR: clear on wet read    EKG: Sinus tachycardia    Vitals  T(C): 36.9 (10-18-24 @ 00:15), Max: 38.8 (10-17-24 @ 22:08)  T(F): 98.5 (10-18-24 @ 00:15), Max: 101.9 (10-17-24 @ 22:08)  HR: 85 (10-18-24 @ 00:15) (85 - 113)  BP: 100/65 (10-18-24 @ 00:15) (100/65 - 116/68)  RR: 18 (10-18-24 @ 00:15) (18 - 20)  SpO2: 100% (10-18-24 @ 00:15) (95% - 100%)  Wt(kg): --    Review of Systems  CONSTITUTIONAL:  Weakness, no fevers or chills  EYES/ENT:  R side blurry vision;  No vertigo or throat pain   NECK:  No pain or stiffness  RESPIRATORY:  No cough, wheezing, hemoptysis; No shortness of breath  CARDIOVASCULAR:  No chest pain or palpitations  GASTROINTESTINAL:  No abdominal or epigastric pain. No nausea, vomiting, or hematemesis; No diarrhea, + constipation. No melena or hematochezia.  GENITOURINARY:  No dysuria, frequency or hematuria  MUSCULOSKELETAL: No muscle pains or joint pains  NEUROLOGICAL:  No numbness, + weakness, lightheadedness, dizziness, headaches  SKIN:  No itching, rashes      Physical Exam  GENERAL: AAOx3. Well-nourished, laying in bed appearing pale and weak  HEENT: atraumatic, normocephalic, moist mucus membranes, pale mucus membranes   LUNGS: Clear to auscultation bilaterally  HEART: S1/S2. No heaves or thrills  ABD: Soft, non-tender, non-distended. tender in the paraumbilic region  EXT/NEURO: Strength, sensation, ROM grossly intact but weak, B/L 2+ pitting edema  SKIN: No breaks, erythema, edema   (18 Oct 2024 02:59)       ROS:  Negative except for:weakness/headache     PAST MEDICAL & SURGICAL HISTORY:  Hypertension      Hyperlipemia      Hypothyroidism      History of lymphoma      Cardiac myxoma          SOCIAL HISTORY:    FAMILY HISTORY:      MEDICATIONS  (STANDING):  aspirin enteric coated 81 milliGRAM(s) Oral daily  atorvastatin 80 milliGRAM(s) Oral at bedtime  chlorhexidine 2% Cloths 1 Application(s) Topical <User Schedule>  enoxaparin Injectable 30 milliGRAM(s) SubCutaneous every 24 hours  folic acid 5 milliGRAM(s) Oral daily  influenza   Vaccine 0.5 milliLiter(s) IntraMuscular once  iron sucrose IVPB 200 milliGRAM(s) IV Intermittent <User Schedule>  levothyroxine 50 MICROGram(s) Oral daily  metoprolol succinate ER 50 milliGRAM(s) Oral daily  pantoprazole    Tablet 40 milliGRAM(s) Oral before breakfast  polyethylene glycol 3350 17 Gram(s) Oral every 12 hours  senna 2 Tablet(s) Oral at bedtime    MEDICATIONS  (PRN):  acetaminophen     Tablet .. 650 milliGRAM(s) Oral every 6 hours PRN Temp greater or equal to 38C (100.4F), Mild Pain (1 - 3)  melatonin 3 milliGRAM(s) Oral at bedtime PRN Insomnia      Allergies    penicillin (Unknown)    Intolerances        Vital Signs Last 24 Hrs  T(C): 36.6 (24 Oct 2024 05:19), Max: 36.6 (23 Oct 2024 20:41)  T(F): 97.8 (24 Oct 2024 05:19), Max: 97.8 (23 Oct 2024 20:41)  HR: 110 (24 Oct 2024 05:19) (98 - 110)  BP: 102/69 (24 Oct 2024 05:19) (98/64 - 102/69)  BP(mean): --  RR: 18 (24 Oct 2024 05:19) (18 - 18)  SpO2: 95% (24 Oct 2024 05:19) (95% - 99%)    Parameters below as of 24 Oct 2024 05:19  Patient On (Oxygen Delivery Method): room air        PHYSICAL EXAM  General: adult in NAD  HEENT: clear oropharynx, anicteric sclera, pink conjunctiva  Neck: supple  CV: normal S1/S2 with no murmur rubs or gallops  Lungs: positive air movement b/l ant lungs,clear to auscultation, no wheezes, no rales  Abdomen: soft non-tender non-distended, no hepatosplenomegaly  Ext: no clubbing cyanosis or edema  Skin: no rashes and no petechiae  Neuro: alert and oriented X 4, no focal deficits      LABS:                          8.7    4.63  )-----------( 116      ( 23 Oct 2024 06:06 )             28.0         Mean Cell Volume : 83.3 fL  Mean Cell Hemoglobin : 25.9 pg  Mean Cell Hemoglobin Concentration : 31.1 g/dL  Auto Neutrophil # : 2.66 K/uL  Auto Lymphocyte # : 1.05 K/uL  Auto Monocyte # : 0.88 K/uL  Auto Eosinophil # : 0.00 K/uL  Auto Basophil # : 0.01 K/uL  Auto Neutrophil % : 57.5 %  Auto Lymphocyte % : 22.7 %  Auto Monocyte % : 19.0 %  Auto Eosinophil % : 0.0 %  Auto Basophil % : 0.2 %      Serial CBC's  10-23 @ 06:06  Hct-28.0 / Hgb-8.7 / Plat-116 / RBC-3.36 / WBC-4.63  Serial CBC's  10-21 @ 07:33  Hct-29.4 / Hgb-9.1 / Plat-107 / RBC-3.51 / WBC-4.51      10-23    132[L]  |  100  |  13  ----------------------------<  74  4.3   |  24  |  0.5[L]    Ca    7.3[L]      23 Oct 2024 06:06  Mg     2.1     10-23    TPro  6.7  /  Alb  1.8[L]  /  TBili  0.8  /  DBili  x   /  AST  31  /  ALT  5   /  AlkPhos  173[H]  10-23          Vitamin B12, Serum: 436 pg/mL (10-19 @ 07:30)  Folate, Serum: 3.5 ng/mL (10-19 @ 07:30)  Iron - Total Binding Capacity.: 121 ug/dL (10-19 @ 07:30)  Ferritin: 2642 ng/mL (10-19 @ 07:30)  Reticulocyte Percent: 2.6 % (10-18 @ 11:21)      Quantitative Ig mg/dL (10-21 @ 15:18)          BLOOD SMEAR INTERPRETATION:       RADIOLOGY & ADDITIONAL STUDIES:

## 2024-10-24 NOTE — CONSULT NOTE ADULT - CONSULT REASON
MRI
blurriness in vision. assess for retinal lesions
liver lesions
Evaluation for ILR
brain lesions
fever

## 2024-10-24 NOTE — CHART NOTE - NSCHARTNOTEFT_GEN_A_CORE
VILMA reviewed unremarkable. MRI C and T spine were reviewed - no prior lesions.     Thus far, inflammatory markers are elevated, CSF IgG index elevated. Folate low and B12 lower end of normal. Iron panel appears AOCD. CSF inflammatory but noninfectious. CSF and lab studies pending, noncontributory so far.    Differential remains broad. Thus far, imaging findings on MRI brain appear incidental w/ the exception of question of cognitive decline. For now will continue to treat cerebellar lesion as vascular in etiology. Nonenhancing lesions remain a question, ddx including demyelinating/autoimmune vs neoplastic/paraneoplastic etiology vs vasculitis.     Recommendations:  Elevation of inflammatory markers - consider rheum consult  Heme/onc consult  EP for ILR  FDG pet brain and body   F/u remainder of CSF and lab results  Considering DSA to evaluate for vasculitis; given unremarkable CTA will hold off for now VILMA reviewed unremarkable. MRI C and T spine were reviewed - no prior lesions.     Thus far, inflammatory markers are elevated, CSF IgG index elevated. Folate low and B12 lower end of normal. Iron panel appears AOCD. CSF inflammatory but noninfectious. CSF and lab studies pending, noncontributory so far.    Differential remains broad. Thus far, imaging findings on MRI brain appear incidental w/ the exception of question of cognitive decline. For now will continue to treat cerebellar lesion as vascular in etiology. Nonenhancing lesions remain a question, ddx including demyelinating/autoimmune vs neoplastic/paraneoplastic etiology vs vasculitis.     Recommendations:  Elevation of inflammatory markers - consider rheum consult  Heme/onc consult  EP for ILR  FDG pet brain and body   F/u remainder of CSF and lab results  Considering DSA to evaluate for vasculitis; given unremarkable CTA will hold off for now  On discharge will need follow-up with neurology, if possible Dr. Eleazar Rucker

## 2024-10-24 NOTE — CONSULT NOTE ADULT - ASSESSMENT
63yro female with PMHx of HTN, HLD, hypothyroidism, hx of NHL  >15 yrs back , (in remission)a s of July /2024  and cardiac myxoma with resection (approx 2020) presenting with fever, generalized weakness, dizziness, lightheadedness.    # Brain MRI showing nonenhancing lesions  ?etiology,? infectious  w/u in progress  neuro on board ,  s/p LP   MRI spines are unremarkable   ID on board     - CT A/P showed several hepatic hypodensities 0.7 cm, splenic hypodensity lesion measuring 2.3 cm   get mri abdo with iv contrast to evaluate further   -check nathan markers including CA27-29,CEA,,AFP,CA 19-9     -# Acute on Chronic normocytic anemia,   had detail w/u done including bmbx with   - c/w folic acid   - s/p  Venofer  -no signs of active bleeding   mildly elevated retic   check ERIC/LDH     May need repeat bmbx     - VILMA  shows no cardioembolic source of CVA.   - EP consult placed for ILR placement for possible Afib. Family  deferred  decision as per documentation     cont other supportive care   will f/u

## 2024-10-24 NOTE — PROVIDER CONTACT NOTE (OTHER) - SITUATION
Patient has a headache. Received Tylenol but did not reduce pain. Patient states that has still strong headache and vomited

## 2024-10-25 LAB
ANION GAP SERPL CALC-SCNC: 8 MMOL/L — SIGNIFICANT CHANGE UP (ref 7–14)
APCR PPP: 2.51 RATIO — SIGNIFICANT CHANGE UP
APPEARANCE UR: ABNORMAL
BILIRUB UR-MCNC: ABNORMAL
BUN SERPL-MCNC: 11 MG/DL — SIGNIFICANT CHANGE UP (ref 10–20)
CALCIUM SERPL-MCNC: 7.5 MG/DL — LOW (ref 8.4–10.5)
CANCER AG125 SERPL-ACNC: 10 U/ML — SIGNIFICANT CHANGE UP
CANCER AG19-9 SERPL-ACNC: 9 U/ML — SIGNIFICANT CHANGE UP
CEA SERPL-MCNC: 1 NG/ML — SIGNIFICANT CHANGE UP (ref 0–3.8)
CHLORIDE SERPL-SCNC: 102 MMOL/L — SIGNIFICANT CHANGE UP (ref 98–110)
CO2 SERPL-SCNC: 25 MMOL/L — SIGNIFICANT CHANGE UP (ref 17–32)
COLOR SPEC: SIGNIFICANT CHANGE UP
CREAT ?TM UR-MCNC: 93 MG/DL — SIGNIFICANT CHANGE UP
CREAT SERPL-MCNC: 0.6 MG/DL — LOW (ref 0.7–1.5)
CRYOGLOB SERPL-MCNC: NEGATIVE — SIGNIFICANT CHANGE UP
DIFF PNL FLD: ABNORMAL
DIR ANTIGLOB POLYSPECIFIC INTERPRETATION: ABNORMAL
EGFR: 100 ML/MIN/1.73M2 — SIGNIFICANT CHANGE UP
GLUCOSE SERPL-MCNC: 79 MG/DL — SIGNIFICANT CHANGE UP (ref 70–99)
GLUCOSE UR QL: NEGATIVE MG/DL — SIGNIFICANT CHANGE UP
HCT VFR BLD CALC: 28.7 % — LOW (ref 37–47)
HGB BLD-MCNC: 8.9 G/DL — LOW (ref 12–16)
HISTONE AB SER-ACNC: 1.1 UNITS — HIGH (ref 0–0.9)
KETONES UR-MCNC: NEGATIVE MG/DL — SIGNIFICANT CHANGE UP
LDH SERPL L TO P-CCNC: >1000 — HIGH (ref 50–242)
LEUKOCYTE ESTERASE UR-ACNC: ABNORMAL
MAGNESIUM SERPL-MCNC: 2 MG/DL — SIGNIFICANT CHANGE UP (ref 1.8–2.4)
MCHC RBC-ENTMCNC: 26.2 PG — LOW (ref 27–31)
MCHC RBC-ENTMCNC: 31 G/DL — LOW (ref 32–37)
MCV RBC AUTO: 84.4 FL — SIGNIFICANT CHANGE UP (ref 81–99)
NITRITE UR-MCNC: NEGATIVE — SIGNIFICANT CHANGE UP
NRBC # BLD: 0 /100 WBCS — SIGNIFICANT CHANGE UP (ref 0–0)
PH UR: 6.5 — SIGNIFICANT CHANGE UP (ref 5–8)
PLATELET # BLD AUTO: 110 K/UL — LOW (ref 130–400)
PMV BLD: 9.7 FL — SIGNIFICANT CHANGE UP (ref 7.4–10.4)
POTASSIUM SERPL-MCNC: 4.2 MMOL/L — SIGNIFICANT CHANGE UP (ref 3.5–5)
POTASSIUM SERPL-SCNC: 4.2 MMOL/L — SIGNIFICANT CHANGE UP (ref 3.5–5)
PROT ?TM UR-MCNC: 69 MG/DLG/24H — SIGNIFICANT CHANGE UP
PROT UR-MCNC: 30 MG/DL
PROT/CREAT UR-RTO: 0.7 RATIO — HIGH (ref 0–0.2)
RBC # BLD: 3.4 M/UL — LOW (ref 4.2–5.4)
RBC # FLD: 18.1 % — HIGH (ref 11.5–14.5)
SODIUM SERPL-SCNC: 135 MMOL/L — SIGNIFICANT CHANGE UP (ref 135–146)
SP GR SPEC: 1.02 — SIGNIFICANT CHANGE UP (ref 1–1.03)
TM INTERPRETATION: SIGNIFICANT CHANGE UP
UROBILINOGEN FLD QL: 4 MG/DL (ref 0.2–1)
WBC # BLD: 5.05 K/UL — SIGNIFICANT CHANGE UP (ref 4.8–10.8)
WBC # FLD AUTO: 5.05 K/UL — SIGNIFICANT CHANGE UP (ref 4.8–10.8)

## 2024-10-25 PROCEDURE — 99233 SBSQ HOSP IP/OBS HIGH 50: CPT

## 2024-10-25 RX ADMIN — Medication 50 MILLIGRAM(S): at 06:09

## 2024-10-25 RX ADMIN — PANTOPRAZOLE SODIUM 40 MILLIGRAM(S): 40 TABLET, DELAYED RELEASE ORAL at 05:40

## 2024-10-25 RX ADMIN — CHLORHEXIDINE GLUCONATE 1 APPLICATION(S): 40 SOLUTION TOPICAL at 05:40

## 2024-10-25 RX ADMIN — Medication 81 MILLIGRAM(S): at 12:09

## 2024-10-25 RX ADMIN — Medication 30 MILLIGRAM(S): at 12:11

## 2024-10-25 RX ADMIN — Medication 650 MILLIGRAM(S): at 13:00

## 2024-10-25 RX ADMIN — Medication 650 MILLIGRAM(S): at 12:06

## 2024-10-25 RX ADMIN — Medication 80 MILLIGRAM(S): at 21:28

## 2024-10-25 RX ADMIN — Medication 50 MICROGRAM(S): at 05:40

## 2024-10-25 RX ADMIN — FOLIC ACID 5 MILLIGRAM(S): 1 TABLET ORAL at 12:10

## 2024-10-25 RX ADMIN — Medication 2 TABLET(S): at 21:28

## 2024-10-25 NOTE — PROGRESS NOTE ADULT - ATTENDING COMMENTS
63yro female with PMHx of HTN, HLD, hypothyroidism, hx of lymphoma (in remission), and cardiac myxoma with resection (approx 2020) presenting with fever, generalized weakness, dizziness, lightheadedness.    Assessment & Plan:     Right cerebellar infarct:   Patient with weakness, fever, right side vision loss.   Brain MRI showed Patchy lesion in the inferior right cerebellum with petechial hemorrhage may represent subacute infarct, Multiple additional Nonenhancing lesions throughout the bilateral periventricular white matters, splenium of the corpus callosum, and right urmila DDx demyelinating Disease, as well as paraneoplastic/autoimmune   Encephalitis and lymphoma (less likely given lack of enhancement).   s/p LP 10/21 fluid negative for infection, Protein was elevated 87, glucose 41, PCR negative.   CT angio of head and neck showed no   Further work up is pending to rule out autoimmune disease, infection or Autoimmune encephelopathy   Neuro follow up needed as CSF studies were not all performed as quantity of CSF not sufficient   - Is there a need to consult IR for repeat LP for CSF Fluid to run further tests not run before? Neuro to comment   - VILMA with bubble study no evidence of thrombus or abnormal flow     Fever:   likely due to inflammatory disease as above vs autoimmune disease vs malignancy?   No signs of infectious etiology so far     Acute on Chronic normocytic anemia, symptomatic   Likely anemia of chronic disease  Iron studies showed low iron, low TIBC, Ferritin is high 2600  B12 WNL. low folate folic acid     Summary:    1. Normal global left ventricular systolic function.   2. Left ventricular ejection fraction, by visual estimation, is 55 to 60%.   3. No left atrial appendage thrombus and normal left atrial appendage velocities.   4. Normal left atrial and right atrial size.   5. Trace mitral valve regurgitation.   6. Color flow doppler and intravenous injection of agitated saline demonstrates the presence of an intact intra atrial septum.   7. No evidence of recurrent cardiac myxoma.    Suspected Malignancy   - Oncology consult appreciated   - f/u Tumor Markers: Ca27-29, CEA, , aFP, CA 19-9  - MRI Abdomen assess hypodensity lesion on liver   - discussed with patient     Hypothyroid  - c/w synthroid     # HTN   # HLD  - c/w metoprolol and atorvastatin    DVT prophylaxis: Lovenox    # Code status: Full Code  # Disposition: acute    Family: discussions ongoing at bedside w/ pt's      Pending: MRI abdomen / ?LP? Neuro f/u / Heme/onc f/u / f/u Tumor Markers
63yro female with PMHx of HTN, HLD, hypothyroidism, hx of lymphoma (in remission), and cardiac myxoma with resection (approx 2020) presenting with fever, generalized weakness, dizziness, lightheadedness.    Assessment & Plan:     Right cerebellar infarct:   Patient with weakness, fever, right side vision loss.   MRI Spine: Degenerative arthritis - no localizing deficits to suggest need for MRI given C-spine findings - f/u neuro sx clinic   Brain MRI showed Patchy lesion in the inferior right cerebellum with petechial hemorrhage may represent subacute infarct, Multiple additional Nonenhancing lesions throughout the bilateral periventricular white matters, splenium of the corpus callosum, and right urmila DDx demyelinating Disease, as well as paraneoplastic/autoimmune   Encephalitis and lymphoma (less likely given lack of enhancement).   s/p LP 10/21 fluid negative for infection, Protein was elevated 87, glucose 41, PCR negative.   CT angio of head and neck showed no   Further work up is pending to rule out autoimmune disease, infection or Autoimmune encephelopathy   Neuro follow up needed as CSF studies were not all performed as quantity of CSF not sufficient   - Is there a need to consult IR for repeat LP for CSF Fluid to run further tests not run before? Neuro to comment   - VILMA with bubble study no evidence of thrombus or abnormal flow   - ILR to be placed by EP possibly today as  and wife now agree  - OP Pet Scan  - Get Duplex Carotid Artery r/o TA     Fever: Inflammatory disease vs autoimmune disease vs malignancy?   No signs of infectious etiology so far     Acute on Chronic normocytic anemia, symptomatic   Likely anemia of chronic disease  Iron studies showed low iron, low TIBC, Ferritin is high 2600  B12 WNL. low folate folic acid     Summary:    1. Normal global left ventricular systolic function.   2. Left ventricular ejection fraction, by visual estimation, is 55 to 60%.   3. No left atrial appendage thrombus and normal left atrial appendage velocities.   4. Normal left atrial and right atrial size.   5. Trace mitral valve regurgitation.   6. Color flow doppler and intravenous injection of agitated saline demonstrates the presence of an intact intra atrial septum.   7. No evidence of recurrent cardiac myxoma.    Suspected Malignancy   - Oncology consult appreciated   - f/u Tumor Markers: Ca27-29, CEA, , aFP, CA 19-9  - MRI Abdomen assess hypodensity lesion on liver   - discussed with patient     Hypothyroid  - c/w synthroid     # HTN   # HLD  - c/w metoprolol and atorvastatin    DVT prophylaxis: Lovenox    # Code status: Full Code  # Disposition: acute    Family: discussions ongoing at bedside w/ pt's      Pending: f/u Tumor Markers / Temporal Artery Duplex r/o TA / Pet scan OP w/ Onc / ILR to be placed by EP / d/c planning over weekend     Dispo: Home    Time-based billing (NON-critical care).   60 minutes spent on total encounter. The necessity of the time spent during the encounter on this date of service was due to:   direct pt care and interdisciplinary rounds / coordination of care with heme/neuro/tests review / family / outpt testing reviewed
Patient is a 63yro female with PMHx of HTN, HLD, hypothyroidism, hx of lymphoma (in remission), and cardiac myxoma with resection (approx 2020) presenting with generalized weakness, dizziness, lightheadedness, and bilateral LE swelling.     #Acute on Chronic normocytic anemia, symptomatic   #fever, Weakness   #R sided vision changes ??  #Hx of non hodgkin's lymphoma  #B/L LE pitting edema  #Hx of cardiac myxoma s/p resection   #HTN/ HLD      PLANs:    - no evidence of bleeding, SHAY neg, hbg responded to 2 u prbc, no evidence of hemolysis, f/u folate and B12, f/u Dr. Ochoa as hematology/Oncology  - CTH , Scattered not specific hypodensities, inflammation/ mets/ hemorrhage cant ruled out , pending MRI  - CT abd:  Several hepatic hypodensities 0.7 cm, splenic hypodensity lesion measuring 2.3 cm   - fever in .9, no more reported, covid, flu, Bcx and UA negative, LE duplex neg, watch off Abx   - C/w metoprolol   - DVT ppx: Lovenox  - pending anemia bajwa, brain MRI
In summary,    64yo woman with remote hx of lymphoma, HTN, HLD, hypothyroidism and cardiac myxoma (s/p resection) who initially presented for generalied weakness and LE swelling, found to have acute on chronic anemia and multiple hypodense lesions on CT Head. MRI w/wo showing subacute infarct R cerebellum in addition to multiple nonenhancing lesions in splenium of the corpus callosum, and right>left sabra with associated restricted diffusion and non-enhancing. CSF c/w inflammatory process, no evidence of infection. CTA H/N without notable stenoses, including in posterior circulation. TTE: EF 55-60%, normal LA, no structural lesions.     Per further hx obtained from patient and  at bedside, patient without any known neurological conditions, never had MRI Brain done before. Denies any neurological symptoms prior to arrival including headaches, focal weakness, cognitive decline. No hx of seizures. No family hx of neurological conditions, including MS. Exam only notable for mild memory impairment (1/3 recall) and mild difficulty with concentration.     Impression:  1. subacute R cerebellar stroke, suspect embolic etiology  2. pontine and corpus callosum lesions; unclear etiology and without any notable history or exam abnormalities to correlate findings with; seems to be an underlying inflammatory process with ddx including demyelination, autoimmune processes, neoplastic? (ie lymphoma) though no enhancement seen and csf cytology negative, less likely autoimmune/paraneoplastic encephalitis, ischemic or vasculitis    In addition to above recommendations, can send hypercoagulable workup and if VILMA unremarkable, would also consult EP for ILR placement.
63yro female with PMHx of HTN, HLD, hypothyroidism, hx of lymphoma (in remission), and cardiac myxoma with resection (approx 2020) presenting with fever, generalized weakness, dizziness, lightheadedness.    Assessment & Plan:     Right cerebellar infarct:   Patient with weakness, fever, right side vision loss.   Brain MRI showed Patchy lesion in the inferior right cerebellum with petechial hemorrhage may represent subacute infarct, Multiple additional Nonenhancing lesions throughout the bilateral periventricular white matters, splenium of the corpus callosum, and right urmila DDx demyelinating Disease, as well as paraneoplastic/autoimmune   Encephalitis and lymphoma (less likely given lack of enhancement).   s/p LP 10/21 fluid negative for infection, Protein was elevated 87, glucose 41, PCR negative.   CT angio of head and neck showed no   Further work up is pending to rule out autoimmune disease, infection or Autoimmune encephelopathy   Neurology recommended VILMA to rule out cardiac cause for CVA, thoracic and cervical spine MRI.   - VILMA done today is negative   - f/u Pt disposition     Fever:   likely due to inflammatory disease as above vs autoimmune disease vs malignancy?   No signs of infectious etiology so far     Acute on Chronic normocytic anemia, symptomatic   Likely anemia of chronic disease  Iron studies showed low iron, low TIBC, Ferritin is high 2600  B12 WNL. low folate folic acid     Summary:    1. Normal global left ventricular systolic function.   2. Left ventricular ejection fraction, by visual estimation, is 55 to 60%.   3. No left atrialappendage thrombus and normal left atrial appendage velocities.   4. Normal left atrial and right atrial size.   5. Trace mitral valve regurgitation.   6. Color flow doppler and intravenous injection of agitated saline demonstrates the presence of an intact intra atrial septum.   7. No evidence of recurrent cardiac myxoma.    Hypothyroid  - c/w synthroid     # HTN   # HLD  - c/w metoprolol and atorvastatin    DVT prophylaxis: Lovenox    # Code status: Full Code  # Disposition: acute    Pending: MRI of Thoracic and Cervical Spine / Will discuss with Neuro re ILR as pt does not want at this time / Oncology consult

## 2024-10-25 NOTE — PROGRESS NOTE ADULT - ASSESSMENT
63yro female with PMHx of HTN, HLD, hypothyroidism, hx of NHL  >15 yrs back , (in remission)a s of July /2024  and cardiac myxoma with resection (approx 2020) presenting with fever, generalized weakness, dizziness, lightheadedness.    # Brain MRI showing nonenhancing lesions  ?etiology,? infectious  w/u in progress  neuro on board ,  s/p LP   MRI spines are unremarkable   ID on board     - CT A/P showed several hepatic hypodensities 0.7 cm, splenic hypodensity lesion measuring 2.3 cm   get mri abdo with iv contrast to evaluate further   -check nathan markers including CA27-29,CEA,,AFP,CA 19-9   neuroSx eval for possible bx if rest of the w/u is neg     -# Acute on Chronic normocytic anemia,   had detail w/u done including bmbx with   - c/w folic acid   - s/p  Venofer  -no signs of active bleeding   mildly elevated retic   check ERIC/LDH     May need repeat bmbx     - VILMA  shows no cardioembolic source of CVA.   - EP consult placed for ILR placement for possible Afib. Family  deferred  decision as per documentation     cont other supportive care   will f/u

## 2024-10-25 NOTE — PROGRESS NOTE ADULT - SUBJECTIVE AND OBJECTIVE BOX
24H events:    Patient is a 63y old Female who presents with a chief complaint of Anemia (21 Oct 2024 10:02)    Primary diagnosis of Fever          Today is hospital day 8d.   This morning patient was seen and examined at bedside, resting comfortably in bed.    No acute or major events overnight.      PAST MEDICAL & SURGICAL HISTORY  Hypertension    Hyperlipemia    Hypothyroidism    History of lymphoma    Cardiac myxoma      SOCIAL HISTORY:  Social History:      ALLERGIES:  penicillin (Unknown)    MEDICATIONS:  STANDING MEDICATIONS  aspirin enteric coated 81 milliGRAM(s) Oral daily  atorvastatin 80 milliGRAM(s) Oral at bedtime  chlorhexidine 2% Cloths 1 Application(s) Topical <User Schedule>  enoxaparin Injectable 30 milliGRAM(s) SubCutaneous every 24 hours  folic acid 5 milliGRAM(s) Oral daily  influenza   Vaccine 0.5 milliLiter(s) IntraMuscular once  levothyroxine 50 MICROGram(s) Oral daily  metoprolol succinate ER 50 milliGRAM(s) Oral daily  pantoprazole    Tablet 40 milliGRAM(s) Oral before breakfast  polyethylene glycol 3350 17 Gram(s) Oral every 12 hours  senna 2 Tablet(s) Oral at bedtime    PRN MEDICATIONS  acetaminophen     Tablet .. 650 milliGRAM(s) Oral every 6 hours PRN  melatonin 3 milliGRAM(s) Oral at bedtime PRN    VITALS:   T(F): 99  HR: 104  BP: 107/71  RR: 18  SpO2: 94%    PHYSICAL EXAM:  GENERAL: NAD, lying in bed comfortably  HEAD:  Atraumatic, Normocephalic  EYES: EOMI, PERRLA, conjunctiva and sclera clear  ENT: Moist mucous membranes  NECK: Supple, No JVD  CHEST/LUNG: Clear to auscultation bilaterally; No rales, rhonchi, wheezing, or rubs. Unlabored respirations  HEART: Regular rate and rhythm; No murmurs, rubs, or gallops  ABDOMEN: BSx4; Soft, nontender, nondistended  EXTREMITIES:  2+ Peripheral Pulses, brisk capillary refill. No clubbing, cyanosis, or edema  NERVOUS SYSTEM:  A&Ox3, no focal deficits   SKIN: No rashes or lesions      LABS:                        8.9    5.05  )-----------( 110      ( 25 Oct 2024 05:40 )             28.7     10-    135  |  102  |  11  ----------------------------<  79  4.2   |  25  |  0.6[L]    Ca    7.5[L]      25 Oct 2024 05:40  Mg     2.0     10-        Urinalysis Basic - ( 25 Oct 2024 09:36 )    Color: Dark Yellow / Appearance: Cloudy / S.023 / pH: x  Gluc: x / Ketone: Negative mg/dL  / Bili: Small / Urobili: 4.0 mg/dL   Blood: x / Protein: 30 mg/dL / Nitrite: Negative   Leuk Esterase: Trace / RBC: 10 /HPF / WBC 4 /HPF   Sq Epi: x / Non Sq Epi: 3 /HPF / Bacteria: Occasional /HPF

## 2024-10-25 NOTE — PROGRESS NOTE ADULT - ASSESSMENT
63yro female with PMHx of HTN, HLD, hypothyroidism, hx of lymphoma (in remission), and cardiac myxoma with resection (approx 2020) presenting with fever, generalized weakness, dizziness, lightheadedness.    # Brain MRI showing nonenhancing lesions  # r/o CNS disease, demyelinating disease, paraneoplastic VS others   - MRI Patchy lesion in the inferior right cerebellum with petechial hemorrhage may represent subacute infarct. started ASA and Lipitor per Neuro   - Multiple additional nonenhancing lesions throughout the bilateral periventricular white matters, splenium of the corpus callosum, and right urmila  - LP performed 10/21.  - f/u EBV, Histo, ROBI, Lyme, Borrelia, VDRL, West Nile, ACE (CSF and serum), IgG index, Autoimmune encephelopathy panel (serum and CSF), MOG, Myelin basic protein, Oligoclonal bands, Flow Cytometry, Cytopathology, Paraneoplastic (CSF and serum).  - CSF glucose 41, CSF protein 187. No neutrophils or organisms appreciated on CSF gram stain. No growth on CSF culture. CSF PCR negative.  - CSF , AFB unable to be performed, fungal cultures no growth, crypto negative, HSV negative  - f/u CARLA, Complement level C3/C4, ACE, SPEP, Lyme/Borrelia screen, Syphilis,   - , , CARLA , Anti ds-DNA <1, C3/C4 , RF 10, p-ANCA/c-ANCA negative, a-ANCA indeterminate, SS-A/SS-B <0.2, ACE , TSH 5.68, T4 4.8, SPEP   - f/u Anti-histone Ab, cryoglobulins   - Anti-hardy Ab <0.2, Anti-histone Ab , Anti-RNP Ab <0.2, Cryoglobulins  - f/u EBV PCR  - HIV negative, EBV PCR , CMV PCR negative, RPR negative, hepatitis panel nonreactive  - CT A/P showed several hepatic hypodensities 0.7 cm, splenic hypodensity lesion measuring 2.3 cm   - MRI C and T spine w/ and w/o shows pontine and right cerebellar signal abnormalities, multilevel spondylosis and spondylolisthesis most prominent at C4-5 with spinal and left foraminal stenosis. 8mm right pulmonary nodule in right lower lobe appreciated.   - CTA of head and neck negative for pathology    # Acute on Chronic normocytic anemia, symptomatic   - no evidence of bleeding, SHAY neg  - hbg responded to 2 u prbc  - no evidence of hemolysis  - B12 WNL. low folate  - c/w folic acid   - completed 5 days of Venofer  - heme/onc eval noted  - f/u hemolytic panel  - LDH elevated    # Fever  # Weakness  # R sided vision changes  # Hx of non hodgkin's lymphoma  # Hx of cardiac myxoma s/p resection   - still have intermittent fever, last on 10/21 evening   - RVP negative  - BCx and UA negative  - per ID High suspicion for malignancy as monocytosis, lung findings, splenic/liver/brain findings, elevated ferritin  - Monitor off antimicrobials   - malignancy workup  - Right lower lobe nodular, f/u in 3 mo w Pulmonology   - per Ophthalmology, anticoagulation use minimized during hospital course due to extensive subretinal fluid and subretinal hemorrhages with para macular exudates in right eye suggestive of neovascular AMD process. Patient also has retinal hemorrhages, peripheral and temporal, in left eye. Recommends hematology consult due to retinal hemorrhages and low platelets.  - VILMA completed for workup of subacute strokes and hx of cardiac myxoma. Report shows no cardioembolic source of CVA.   - EP consult placed for ILR placement for possible Afib. Family is deferring decision.  - f/u heme/onc consult  - f/u MRI A/P with IV contrast  - f/u cancer marker labs    # Hypothyroid  - c/w synthroid  - TSH 5.68, T4 4.8    # HTN   # HLD  - c/w metoprolol and atorvastatin      # DVT prophylaxis: Lovenox  # GI prophylaxis: PPI  # Diet: Regular  # Activity: as tolerated  # Code status: Full Code  # Disposition: acute    Pending: f/u MRI A/P, f/u cancer marker labs, f/u hemolytic workup 63yro female with PMHx of HTN, HLD, hypothyroidism, hx of lymphoma (in remission), and cardiac myxoma with resection (approx 2020) presenting with fever, generalized weakness, dizziness, lightheadedness.    # Brain MRI showing nonenhancing lesions  # r/o CNS disease, demyelinating disease, paraneoplastic VS others   - MRI Patchy lesion in the inferior right cerebellum with petechial hemorrhage may represent subacute infarct. started ASA and Lipitor per Neuro   - Multiple additional nonenhancing lesions throughout the bilateral periventricular white matters, splenium of the corpus callosum, and right urmila  - LP performed 10/21.  - f/u EBV, Histo, ROBI, Lyme, Borrelia, VDRL, West Nile, ACE (CSF and serum), IgG index, Autoimmune encephelopathy panel (serum and CSF), MOG, Myelin basic protein, Oligoclonal bands, Flow Cytometry, Cytopathology, Paraneoplastic (CSF and serum).  - CSF glucose 41, CSF protein 187. No neutrophils or organisms appreciated on CSF gram stain. No growth on CSF culture. CSF PCR negative.  - CSF , AFB unable to be performed, fungal cultures no growth, crypto negative, HSV negative  - f/u CARLA, Complement level C3/C4, ACE, SPEP, Lyme/Borrelia screen, Syphilis,   - , , CARLA , Anti ds-DNA <1, C3/C4 , RF 10, p-ANCA/c-ANCA negative, a-ANCA indeterminate, SS-A/SS-B <0.2, ACE , TSH 5.68, T4 4.8, SPEP   - f/u Anti-histone Ab, cryoglobulins   - Anti-hardy Ab <0.2, Anti-histone Ab , Anti-RNP Ab <0.2, Cryoglobulins  - f/u EBV PCR  - HIV negative, EBV PCR , CMV PCR negative, RPR negative, hepatitis panel nonreactive  - CT A/P showed several hepatic hypodensities 0.7 cm, splenic hypodensity lesion measuring 2.3 cm   - MRI C and T spine w/ and w/o shows pontine and right cerebellar signal abnormalities, multilevel spondylosis and spondylolisthesis most prominent at C4-5 with spinal and left foraminal stenosis. 8mm right pulmonary nodule in right lower lobe appreciated.   - CTA of head and neck negative for pathology    # Acute on Chronic normocytic anemia, symptomatic   - no evidence of bleeding, SHAY neg  - hbg responded to 2 u prbc  - no evidence of hemolysis  - B12 WNL. low folate  - c/w folic acid   - completed 5 days of Venofer  - heme/onc eval noted  - f/u hemolytic panel  - LDH elevated  - f/u UA    # Fever  # Weakness  # R sided vision changes  # Hx of non hodgkin's lymphoma  # Hx of cardiac myxoma s/p resection   - still have intermittent fever, last on 10/21 evening   - RVP negative  - BCx and UA negative  - per ID High suspicion for malignancy as monocytosis, lung findings, splenic/liver/brain findings, elevated ferritin  - Monitor off antimicrobials   - malignancy workup  - Right lower lobe nodular, f/u in 3 mo w Pulmonology   - per Ophthalmology, anticoagulation use minimized during hospital course due to extensive subretinal fluid and subretinal hemorrhages with para macular exudates in right eye suggestive of neovascular AMD process. Patient also has retinal hemorrhages, peripheral and temporal, in left eye. Recommends hematology consult due to retinal hemorrhages and low platelets.  - VILMA completed for workup of subacute strokes and hx of cardiac myxoma. Report shows no cardioembolic source of CVA.   - EP consult placed for ILR placement for possible Afib. Family is deferring decision.  - f/u heme/onc consult  - f/u MRI A/P with IV contrast  - f/u cancer marker labs    # Hypothyroid  - c/w synthroid  - TSH 5.68, T4 4.8    # HTN   # HLD  - c/w metoprolol and atorvastatin      # DVT prophylaxis: Lovenox  # GI prophylaxis: PPI  # Diet: Regular  # Activity: as tolerated  # Code status: Full Code  # Disposition: acute    Pending: f/u MRI A/P, f/u cancer marker labs, f/u hemolytic workup

## 2024-10-25 NOTE — PROGRESS NOTE ADULT - SUBJECTIVE AND OBJECTIVE BOX
Patient is a 63y old  Female who presents with a chief complaint of Anemia (25 Oct 2024 10:55)       Pt is seen and examined  pt is awake and lying in bed          ROS:  Negative except for:weakness    MEDICATIONS  (STANDING):  aspirin enteric coated 81 milliGRAM(s) Oral daily  atorvastatin 80 milliGRAM(s) Oral at bedtime  chlorhexidine 2% Cloths 1 Application(s) Topical <User Schedule>  enoxaparin Injectable 30 milliGRAM(s) SubCutaneous every 24 hours  folic acid 5 milliGRAM(s) Oral daily  influenza   Vaccine 0.5 milliLiter(s) IntraMuscular once  levothyroxine 50 MICROGram(s) Oral daily  metoprolol succinate ER 50 milliGRAM(s) Oral daily  pantoprazole    Tablet 40 milliGRAM(s) Oral before breakfast  polyethylene glycol 3350 17 Gram(s) Oral every 12 hours  senna 2 Tablet(s) Oral at bedtime    MEDICATIONS  (PRN):  acetaminophen     Tablet .. 650 milliGRAM(s) Oral every 6 hours PRN Temp greater or equal to 38C (100.4F), Mild Pain (1 - 3)  melatonin 3 milliGRAM(s) Oral at bedtime PRN Insomnia      Allergies    penicillin (Unknown)    Intolerances        Vital Signs Last 24 Hrs  T(C): 37.2 (25 Oct 2024 05:03), Max: 37.2 (25 Oct 2024 05:03)  T(F): 99 (25 Oct 2024 05:03), Max: 99 (25 Oct 2024 05:03)  HR: 104 (25 Oct 2024 05:03) (85 - 104)  BP: 107/71 (25 Oct 2024 05:03) (103/69 - 107/71)  BP(mean): 83 (25 Oct 2024 05:03) (83 - 83)  RR: 18 (25 Oct 2024 05:03) (18 - 18)  SpO2: 94% (25 Oct 2024 05:03) (94% - 97%)    Parameters below as of 25 Oct 2024 05:03  Patient On (Oxygen Delivery Method): room air        PHYSICAL EXAM  General: adult in NAD  HEENT: clear oropharynx, anicteric sclera, pink conjunctiva  Neck: supple  CV: normal S1/S2 with no murmur rubs or gallops  Lungs: positive air movement b/l ant lungs,clear to auscultation, no wheezes, no rales  Abdomen: soft non-tender non-distended, no hepatosplenomegaly  Ext: no clubbing cyanosis or edema  Skin: no rashes and no petechiae  Neuro: alert and oriented X 4, no focal deficits  LABS:                          8.9    5.05  )-----------( 110      ( 25 Oct 2024 05:40 )             28.7         Mean Cell Volume : 84.4 fL  Mean Cell Hemoglobin : 26.2 pg  Mean Cell Hemoglobin Concentration : 31.0 g/dL  Auto Neutrophil # : x  Auto Lymphocyte # : x  Auto Monocyte # : x  Auto Eosinophil # : x  Auto Basophil # : x  Auto Neutrophil % : x  Auto Lymphocyte % : x  Auto Monocyte % : x  Auto Eosinophil % : x  Auto Basophil % : x    Serial CBC's  10-25 @ 05:40  Hct-28.7 / Hgb-8.9 / Plat-110 / RBC-3.40 / WBC-5.05          Serial CBC's  10-23 @ 06:06  Hct-28.0 / Hgb-8.7 / Plat-116 / RBC-3.36 / WBC-4.63            10-25    135  |  102  |  11  ----------------------------<  79  4.2   |  25  |  0.6[L]    Ca    7.5[L]      25 Oct 2024 05:40  Mg     2.0     10-25            WBC Count: 5.05 K/uL (10-25-24 @ 05:40)  Hemoglobin: 8.9 g/dL (10-25-24 @ 05:40)  Hematocrit: 28.7 % (10-25-24 @ 05:40)  Platelet Count - Automated: 110 K/uL (10-25-24 @ 05:40)  Platelet Count - Automated: 116 K/uL (10-23-24 @ 06:06)  WBC Count: 4.63 K/uL (10-23-24 @ 06:06)  Hemoglobin: 8.7 g/dL (10-23-24 @ 06:06)  Hematocrit: 28.0 % (10-23-24 @ 06:06)  WBC Count: 4.51 K/uL (10-21-24 @ 07:33)  Hemoglobin: 9.1 g/dL (10-21-24 @ 07:33)  Hematocrit: 29.4 % (10-21-24 @ 07:33)  Platelet Count - Automated: 107 K/uL (10-21-24 @ 07:33)  WBC Count: 4.62 K/uL (10-20-24 @ 07:29)  Hemoglobin: 8.9 g/dL (10-20-24 @ 07:29)  Hematocrit: 28.9 % (10-20-24 @ 07:29)  Platelet Count - Automated: 120 K/uL (10-20-24 @ 07:29)  Vitamin B12, Serum: 436 pg/mL (10-19-24 @ 07:30)  Folate, Serum: 3.5 ng/mL (10-19-24 @ 07:30)  Iron - Total Binding Capacity.: 121 ug/dL (10-19-24 @ 07:30)  Ferritin: 2642 ng/mL (10-19-24 @ 07:30)  WBC Count: 5.56 K/uL (10-19-24 @ 07:30)  Hemoglobin: 9.2 g/dL (10-19-24 @ 07:30)  Hematocrit: 29.7 % (10-19-24 @ 07:30)  Platelet Count - Automated: 144 K/uL (10-19-24 @ 07:30)  WBC Count: 5.54 K/uL (10-18-24 @ 21:00)  Hemoglobin: 9.2 g/dL (10-18-24 @ 21:00)  Hematocrit: 29.3 % (10-18-24 @ 21:00)  Platelet Count - Automated: 144 K/uL (10-18-24 @ 21:00)  WBC Count: 5.24 K/uL (10-18-24 @ 16:48)  Hemoglobin: 9.9 g/dL (10-18-24 @ 16:48)  Hematocrit: 31.5 % (10-18-24 @ 16:48)  Platelet Count - Automated: 146 K/uL (10-18-24 @ 16:48)  WBC Count: 4.12 K/uL (10-18-24 @ 11:21)  Hemoglobin: 9.5 g/dL (10-18-24 @ 11:21)  Hematocrit: 30.7 % (10-18-24 @ 11:21)  Platelet Count - Automated: 150 K/uL (10-18-24 @ 11:21)  Reticulocyte Percent: 2.6 % (10-18-24 @ 11:21)  Hemoglobin: 6.6 g/dL (10-17-24 @ 21:50)  Platelet Count - Automated: 147 K/uL (10-17-24 @ 21:50)  Hematocrit: 22.3 % (10-17-24 @ 21:50)  WBC Count: 4.96 K/uL (10-17-24 @ 21:50)      Quantitative Ig mg/dL (10-21 @ 15:18)            BLOOD SMEAR INTERPRETATION:       RADIOLOGY & ADDITIONAL STUDIES:

## 2024-10-26 LAB
AFP-TM SERPL-MCNC: <1.8 NG/ML — SIGNIFICANT CHANGE UP
CANCER AG27-29 SERPL-ACNC: 36.1 U/ML — SIGNIFICANT CHANGE UP (ref 0–38.6)
CULTURE RESULTS: SIGNIFICANT CHANGE UP
SPECIMEN SOURCE: SIGNIFICANT CHANGE UP

## 2024-10-26 PROCEDURE — 99233 SBSQ HOSP IP/OBS HIGH 50: CPT

## 2024-10-26 RX ADMIN — Medication 81 MILLIGRAM(S): at 12:24

## 2024-10-26 RX ADMIN — CHLORHEXIDINE GLUCONATE 1 APPLICATION(S): 40 SOLUTION TOPICAL at 05:45

## 2024-10-26 RX ADMIN — Medication 50 MILLIGRAM(S): at 05:42

## 2024-10-26 RX ADMIN — FOLIC ACID 5 MILLIGRAM(S): 1 TABLET ORAL at 12:25

## 2024-10-26 RX ADMIN — Medication 80 MILLIGRAM(S): at 21:51

## 2024-10-26 RX ADMIN — Medication 50 MICROGRAM(S): at 05:42

## 2024-10-26 RX ADMIN — Medication 30 MILLIGRAM(S): at 12:24

## 2024-10-26 RX ADMIN — PANTOPRAZOLE SODIUM 40 MILLIGRAM(S): 40 TABLET, DELAYED RELEASE ORAL at 05:42

## 2024-10-26 RX ADMIN — POLYETHYLENE GLYCOL 3350 17 GRAM(S): 17 POWDER, FOR SOLUTION ORAL at 05:42

## 2024-10-26 RX ADMIN — Medication 2 TABLET(S): at 21:51

## 2024-10-26 NOTE — PROGRESS NOTE ADULT - SUBJECTIVE AND OBJECTIVE BOX
Patient is a 63y old  Female who presents with a chief complaint of Anemia (25 Oct 2024 13:46)       Pt is seen and examined  pt is awake and lying in bed      ROS:  Negative     MEDICATIONS  (STANDING):  aspirin enteric coated 81 milliGRAM(s) Oral daily  atorvastatin 80 milliGRAM(s) Oral at bedtime  chlorhexidine 2% Cloths 1 Application(s) Topical <User Schedule>  enoxaparin Injectable 30 milliGRAM(s) SubCutaneous every 24 hours  folic acid 5 milliGRAM(s) Oral daily  influenza   Vaccine 0.5 milliLiter(s) IntraMuscular once  levothyroxine 50 MICROGram(s) Oral daily  metoprolol succinate ER 50 milliGRAM(s) Oral daily  pantoprazole    Tablet 40 milliGRAM(s) Oral before breakfast  polyethylene glycol 3350 17 Gram(s) Oral every 12 hours  senna 2 Tablet(s) Oral at bedtime    MEDICATIONS  (PRN):  acetaminophen     Tablet .. 650 milliGRAM(s) Oral every 6 hours PRN Temp greater or equal to 38C (100.4F), Mild Pain (1 - 3)  melatonin 3 milliGRAM(s) Oral at bedtime PRN Insomnia      Allergies    penicillin (Unknown)    Intolerances        Vital Signs Last 24 Hrs  T(C): 36.4 (26 Oct 2024 04:58), Max: 36.5 (25 Oct 2024 20:35)  T(F): 97.6 (26 Oct 2024 04:58), Max: 97.7 (25 Oct 2024 20:35)  HR: 111 (26 Oct 2024 04:58) (83 - 111)  BP: 106/70 (26 Oct 2024 04:58) (101/65 - 111/76)  BP(mean): 76 (26 Oct 2024 04:58) (76 - 76)  RR: 18 (26 Oct 2024 04:58) (18 - 18)  SpO2: 95% (26 Oct 2024 04:58) (95% - 97%)    Parameters below as of 26 Oct 2024 04:58  Patient On (Oxygen Delivery Method): room air        PHYSICAL EXAM  General: adult in NAD  HEENT: clear oropharynx, anicteric sclera, pink conjunctiva  Neck: supple  CV: normal S1/S2 with no murmur rubs or gallops  Lungs: positive air movement b/l ant lungs,clear to auscultation, no wheezes, no rales  Abdomen: soft non-tender non-distended, no hepatosplenomegaly  Ext: no clubbing cyanosis or edema  Skin: no rashes and no petechiae  Neuro: alert and oriented X 4, no focal deficits  LABS:                          8.9    5.05  )-----------( 110      ( 25 Oct 2024 05:40 )             28.7         Mean Cell Volume : 84.4 fL  Mean Cell Hemoglobin : 26.2 pg  Mean Cell Hemoglobin Concentration : 31.0 g/dL  Auto Neutrophil # : x  Auto Lymphocyte # : x  Auto Monocyte # : x  Auto Eosinophil # : x  Auto Basophil # : x  Auto Neutrophil % : x  Auto Lymphocyte % : x  Auto Monocyte % : x  Auto Eosinophil % : x  Auto Basophil % : x    Serial CBC's  10-25 @ 05:40  Hct-28.7 / Hgb-8.9 / Plat-110 / RBC-3.40 / WBC-5.05          Serial CBC's  10-23 @ 06:06  Hct-28.0 / Hgb-8.7 / Plat-116 / RBC-3.36 / WBC-4.63            10-25    135  |  102  |  11  ----------------------------<  79  4.2   |  25  |  0.6[L]    Ca    7.5[L]      25 Oct 2024 05:40  Mg     2.0     10-25            WBC Count: 5.05 K/uL (10-25-24 @ 05:40)  Hemoglobin: 8.9 g/dL (10-25-24 @ 05:40)  Hematocrit: 28.7 % (10-25-24 @ 05:40)  Platelet Count - Automated: 110 K/uL (10-25-24 @ 05:40)  WBC Count: 4.63 K/uL (10-23-24 @ 06:06)  Hemoglobin: 8.7 g/dL (10-23-24 @ 06:06)  Hematocrit: 28.0 % (10-23-24 @ 06:06)  Platelet Count - Automated: 116 K/uL (10-23-24 @ 06:06)  WBC Count: 4.51 K/uL (10-21-24 @ 07:33)  Hemoglobin: 9.1 g/dL (10-21-24 @ 07:33)  Hematocrit: 29.4 % (10-21-24 @ 07:33)  Platelet Count - Automated: 107 K/uL (10-21-24 @ 07:33)  WBC Count: 4.62 K/uL (10-20-24 @ 07:29)  Hemoglobin: 8.9 g/dL (10-20-24 @ 07:29)  Hematocrit: 28.9 % (10-20-24 @ 07:29)  Platelet Count - Automated: 120 K/uL (10-20-24 @ 07:29)  Vitamin B12, Serum: 436 pg/mL (10-19-24 @ 07:30)  Folate, Serum: 3.5 ng/mL (10-19-24 @ 07:30)  Iron - Total Binding Capacity.: 121 ug/dL (10-19-24 @ 07:30)  Ferritin: 2642 ng/mL (10-19-24 @ 07:30)  WBC Count: 5.56 K/uL (10-19-24 @ 07:30)  Hemoglobin: 9.2 g/dL (10-19-24 @ 07:30)  Hematocrit: 29.7 % (10-19-24 @ 07:30)  Platelet Count - Automated: 144 K/uL (10-19-24 @ 07:30)  WBC Count: 5.54 K/uL (10-18-24 @ 21:00)  Hemoglobin: 9.2 g/dL (10-18-24 @ 21:00)  Hematocrit: 29.3 % (10-18-24 @ 21:00)  Platelet Count - Automated: 144 K/uL (10-18-24 @ 21:00)  WBC Count: 5.24 K/uL (10-18-24 @ 16:48)  Hemoglobin: 9.9 g/dL (10-18-24 @ 16:48)  Hematocrit: 31.5 % (10-18-24 @ 16:48)  Platelet Count - Automated: 146 K/uL (10-18-24 @ 16:48)  WBC Count: 4.12 K/uL (10-18-24 @ 11:21)  Hemoglobin: 9.5 g/dL (10-18-24 @ 11:21)  Hematocrit: 30.7 % (10-18-24 @ 11:21)  Platelet Count - Automated: 150 K/uL (10-18-24 @ 11:21)  Reticulocyte Percent: 2.6 % (10-18-24 @ 11:21)  Hemoglobin: 6.6 g/dL (10-17-24 @ 21:50)  Platelet Count - Automated: 147 K/uL (10-17-24 @ 21:50)  Hematocrit: 22.3 % (10-17-24 @ 21:50)  WBC Count: 4.96 K/uL (10-17-24 @ 21:50)      Quantitative Ig mg/dL (10-21 @ 15:18)            BLOOD SMEAR INTERPRETATION:       RADIOLOGY & ADDITIONAL STUDIES:

## 2024-10-26 NOTE — PROGRESS NOTE ADULT - ASSESSMENT
63yro female with PMHx of HTN, HLD, hypothyroidism, hx of NHL  >15 yrs back , (in remission)a s of July /2024  and cardiac myxoma with resection (approx 2020) presenting with fever, generalized weakness, dizziness, lightheadedness.    # Brain MRI showing nonenhancing lesions  ?etiology,? infectious  w/u in progress  neuro on board ,  s/p LP   MRI spines are unremarkable   ID on board     - CT A/P showed several hepatic hypodensities 0.7 cm, splenic hypodensity lesion measuring 2.3 cm   -will f/u  nathan markers including CA27-29,CEA,,AFP,CA 19-9  will get pet/ct as outpt    neuroSx eval for possible bx     -# Acute on Chronic normocytic anemia,   had detail w/u done including bmbx with   - c/w folic acid   - s/p  Venofer  -no signs of active bleeding   mildly elevated retic     May need repeat bmbx as outpt     - VILMA  shows no cardioembolic source of CVA.   - awaiting  for ILR placement for possible Afib.     cont other supportive care   will f/u

## 2024-10-26 NOTE — PROGRESS NOTE ADULT - SUBJECTIVE AND OBJECTIVE BOX
24H events:    Patient is a 63y old Female who presents with a chief complaint of Anemia (21 Oct 2024 10:02)    Primary diagnosis of Fever    Today is hospital day 9.   No overnight events  I discussed the case with pt and  at bedside in detail and wrote down on paper plan and what is pending and d/c planning     PAST MEDICAL & SURGICAL HISTORY  Hypertension    Hyperlipemia    Hypothyroidism    History of lymphoma    Cardiac myxoma      SOCIAL HISTORY:  Social History:      ALLERGIES:  penicillin (Unknown)    MEDICATIONS:  STANDING MEDICATIONS  aspirin enteric coated 81 milliGRAM(s) Oral daily  atorvastatin 80 milliGRAM(s) Oral at bedtime  chlorhexidine 2% Cloths 1 Application(s) Topical <User Schedule>  enoxaparin Injectable 30 milliGRAM(s) SubCutaneous every 24 hours  folic acid 5 milliGRAM(s) Oral daily  influenza   Vaccine 0.5 milliLiter(s) IntraMuscular once  levothyroxine 50 MICROGram(s) Oral daily  metoprolol succinate ER 50 milliGRAM(s) Oral daily  pantoprazole    Tablet 40 milliGRAM(s) Oral before breakfast  polyethylene glycol 3350 17 Gram(s) Oral every 12 hours  senna 2 Tablet(s) Oral at bedtime    PRN MEDICATIONS  acetaminophen     Tablet .. 650 milliGRAM(s) Oral every 6 hours PRN  melatonin 3 milliGRAM(s) Oral at bedtime PRN    VITALS:     T(C): 36.4 (26 Oct 2024 04:58), Max: 36.5 (25 Oct 2024 20:35)  T(F): 97.6 (26 Oct 2024 04:58), Max: 97.7 (25 Oct 2024 20:35)  HR: 111 (26 Oct 2024 04:58) (83 - 111)  BP: 106/70 (26 Oct 2024 04:58) (106/70 - 111/76)  BP(mean): 76 (26 Oct 2024 04:58) (76 - 76)  RR: 18 (26 Oct 2024 04:58) (18 - 18)  SpO2: 95% (26 Oct 2024 04:58) (95% - 97%)    Parameters below as of 26 Oct 2024 04:58  Patient On (Oxygen Delivery Method): room air      PHYSICAL EXAM:  GENERAL: NAD, lying in bed comfortably  HEAD:  Atraumatic, Normocephalic  EYES: EOMI, PERRLA, conjunctiva and sclera clear  ENT: Moist mucous membranes  NECK: Supple, No JVD  CHEST/LUNG: Clear to auscultation bilaterally; No rales, rhonchi, wheezing, or rubs. Unlabored respirations  HEART: Regular rate and rhythm; No murmurs, rubs, or gallops  ABDOMEN: BSx4; Soft, nontender, nondistended  EXTREMITIES:  2+ Peripheral Pulses, brisk capillary refill. No clubbing, cyanosis, or edema  NERVOUS SYSTEM:  A&Ox3, no focal deficits   SKIN: No rashes or lesions      LABS:    no labs today                         8.9    5.05  )-----------( 110      ( 25 Oct 2024 05:40 )             28.7     10-    135  |  102  |  11  ----------------------------<  79  4.2   |  25  |  0.6[L]    Ca    7.5[L]      25 Oct 2024 05:40    Mg     2.0     10-    Urinalysis Basic - ( 25 Oct 2024 09:36 )    Color: Dark Yellow / Appearance: Cloudy / S.023 / pH: x  Gluc: x / Ketone: Negative mg/dL  / Bili: Small / Urobili: 4.0 mg/dL   Blood: x / Protein: 30 mg/dL / Nitrite: Negative   Leuk Esterase: Trace / RBC: 10 /HPF / WBC 4 /HPF   Sq Epi: x / Non Sq Epi: 3 /HPF / Bacteria: Occasional /HPF

## 2024-10-26 NOTE — PROGRESS NOTE ADULT - ASSESSMENT
63yro female with PMHx of HTN, HLD, hypothyroidism, hx of lymphoma (in remission), and cardiac myxoma with resection (approx 2020) presenting with fever, generalized weakness, dizziness, lightheadedness.    # Brain MRI showing nonenhancing lesions  # r/o CNS disease, demyelinating disease, paraneoplastic VS others   - MRI Patchy lesion in the inferior right cerebellum with petechial hemorrhage may represent subacute infarct. started ASA and Lipitor per Neuro   - Multiple additional nonenhancing lesions throughout the bilateral periventricular white matters, splenium of the corpus callosum, and right urmila  - LP performed 10/21 extensive w/up sent most of which was able to be performed due to Quantity not sufficient for CSF per lab - this was discussed with Neurologist and recommended no need to repeat LP to run rest of tests. Neurology also does not recommend any neurosx evaluation or brain biopsy at this time as he thinks findings are non-specific. Neurologist recommends the pt to f/u at [facility] FOR FURTHER INVESTIGATION.   - EBV ++ IgG   - CSF glucose 41, CSF protein 187. No neutrophils or organisms appreciated on CSF gram stain. No growth on CSF culture. CSF PCR negative.  - CSF , AFB unable to be performed, fungal cultures no growth, crypto negative, HSV negative  - CARLA/COMPLEMENT LEVEL NL   - , , CARLA , Anti ds-DNA <1, C3/C4 , RF 10, p-ANCA/c-ANCA negative, a-ANCA indeterminate, SS-A/SS-B <0.2, ACE , TSH 5.68, T4 4.8, SPEP   - HIV negative, EBV PCR , CMV PCR negative, RPR negative, hepatitis panel nonreactive  - CT A/P showed several hepatic hypodensities 0.7 cm, splenic hypodensity lesion measuring 2.3 cm   -->  PATIENT HAD MRI DONE 2 WEEKS AGO WITH DR STEFFANIE DANG ONCOLOGIST - WHICH SHOWED NO PELVIC OR ABD LAD, ENLARGED LIVER AND SPLEEN. D/W DR ANDREWS. NO NEED TO REPEAT MRI ABD.  - MRI C and T spine w/ and w/o shows pontine and right cerebellar signal abnormalities, multilevel spondylosis and spondylolisthesis most prominent at C4-5 with spinal and left foraminal stenosis. 8mm right pulmonary nodule in right lower lobe appreciated.   - CTA of head and neck negative for pathology  - Will check TA Duplex to r/o Temporal Arteritis   - EP placing ILR Monday before d/c and family and pt agreable     # Acute on Chronic normocytic anemia, symptomatic   - no evidence of bleeding, SHAY neg  - hbg responded to 2 u prbc  - no evidence of hemolysis  - B12 WNL. low folate  - c/w folic acid   - Completed 5 days of Venofer  - heme/onc eval noted  - LDH elevated and direct coomb's test is negative pt likely not hemolyzing   - Elevated LDH / Hepatosplenomegally -> Rule out Recurrence of Lymphoma --> PET CT outpt w/ Dr Andrews and MARGIE Richardson     # Acute Stroke unexplained etiology at this time - f/u Pet Scan and TA Duplex and ILR f/u Tumor Markers   # Fever  # Weakness  # R sided vision changes  # Hx of non hodgkin's lymphoma  # Hx of cardiac myxoma s/p resection   - still have intermittent fever, last on 10/21 evening   - RVP negative  - BCx and UA negative  - per ID High suspicion for malignancy as monocytosis, lung findings, splenic/liver/brain findings, elevated ferritin  - Monitor off antimicrobials   - malignancy workup  - Right lower lobe nodular, f/u in 3 mo w Pulmonology   - per Ophthalmology, anticoagulation use minimized during hospital course due to extensive subretinal fluid and subretinal hemorrhages with para macular exudates in right eye suggestive of neovascular AMD process. Patient also has retinal hemorrhages, peripheral and temporal, in left eye. Recommends hematology consult due to retinal hemorrhages and low platelets.  - VILMA completed for workup of subacute strokes and hx of cardiac myxoma. Report shows no cardioembolic source of CVA.   - EP consult placed for ILR placement for possible Afib. Family is deferring decision.    # Hypothyroid  - c/w synthroid  - TSH 5.68, T4 4.8    # HTN   # HLD  - c/w metoprolol and atorvastatin      # DVT prophylaxis: Lovenox  # GI prophylaxis: PPI  # Diet: Regular  # Activity: as tolerated  # Code status: Full Code  # Disposition: acute    Pending: f/u TA Duplex / ILR Placement / OP Pet CT / Tumor Markers   Dispo: possible d/c on Monday

## 2024-10-27 PROCEDURE — 93886 INTRACRANIAL COMPLETE STUDY: CPT | Mod: 26

## 2024-10-27 PROCEDURE — 99232 SBSQ HOSP IP/OBS MODERATE 35: CPT

## 2024-10-27 RX ADMIN — CHLORHEXIDINE GLUCONATE 1 APPLICATION(S): 40 SOLUTION TOPICAL at 05:27

## 2024-10-27 RX ADMIN — Medication 50 MICROGRAM(S): at 05:27

## 2024-10-27 RX ADMIN — Medication 81 MILLIGRAM(S): at 11:31

## 2024-10-27 RX ADMIN — Medication 80 MILLIGRAM(S): at 21:52

## 2024-10-27 RX ADMIN — Medication 30 MILLIGRAM(S): at 11:31

## 2024-10-27 RX ADMIN — FOLIC ACID 5 MILLIGRAM(S): 1 TABLET ORAL at 11:30

## 2024-10-27 RX ADMIN — PANTOPRAZOLE SODIUM 40 MILLIGRAM(S): 40 TABLET, DELAYED RELEASE ORAL at 05:26

## 2024-10-27 RX ADMIN — Medication 50 MILLIGRAM(S): at 05:26

## 2024-10-27 RX ADMIN — POLYETHYLENE GLYCOL 3350 17 GRAM(S): 17 POWDER, FOR SOLUTION ORAL at 05:26

## 2024-10-27 NOTE — PROGRESS NOTE ADULT - SUBJECTIVE AND OBJECTIVE BOX
Patient is a 63y old  Female who presents with a chief complaint of Anemia (26 Oct 2024 14:28)       Pt is seen and examined  pt is awake and lying in bed        ROS:  Negative except for:weakness    MEDICATIONS  (STANDING):  aspirin enteric coated 81 milliGRAM(s) Oral daily  atorvastatin 80 milliGRAM(s) Oral at bedtime  chlorhexidine 2% Cloths 1 Application(s) Topical <User Schedule>  enoxaparin Injectable 30 milliGRAM(s) SubCutaneous every 24 hours  folic acid 5 milliGRAM(s) Oral daily  levothyroxine 50 MICROGram(s) Oral daily  metoprolol succinate ER 50 milliGRAM(s) Oral daily  pantoprazole    Tablet 40 milliGRAM(s) Oral before breakfast  polyethylene glycol 3350 17 Gram(s) Oral every 12 hours  senna 2 Tablet(s) Oral at bedtime    MEDICATIONS  (PRN):  acetaminophen     Tablet .. 650 milliGRAM(s) Oral every 6 hours PRN Temp greater or equal to 38C (100.4F), Mild Pain (1 - 3)  melatonin 3 milliGRAM(s) Oral at bedtime PRN Insomnia      Allergies    penicillin (Unknown)    Intolerances        Vital Signs Last 24 Hrs  T(C): 36.4 (27 Oct 2024 05:09), Max: 36.8 (26 Oct 2024 14:59)  T(F): 97.6 (27 Oct 2024 05:09), Max: 98.3 (26 Oct 2024 14:59)  HR: 103 (27 Oct 2024 05:09) (74 - 103)  BP: 106/71 (27 Oct 2024 05:09) (104/70 - 130/75)  BP(mean): --  RR: 18 (27 Oct 2024 05:09) (18 - 18)  SpO2: 71% (27 Oct 2024 05:09) (71% - 98%)        PHYSICAL EXAM  General: adult in NAD  HEENT: clear oropharynx, anicteric sclera, pink conjunctiva  Neck: supple  CV: normal S1/S2 with no murmur rubs or gallops  Lungs: positive air movement b/l ant lungs,clear to auscultation, no wheezes, no rales  Abdomen: soft non-tender non-distended, no hepatosplenomegaly  Ext: no clubbing cyanosis or edema  Skin: no rashes and no petechiae  Neuro: alert and oriented X 4, no focal deficits  LABS:          Serial CBC's  10-25 @ 05:40  Hct-28.7 / Hgb-8.9 / Plat-110 / RBC-3.40 / WBC-5.05                        WBC Count: 5.05 K/uL (10-25-24 @ 05:40)  Hemoglobin: 8.9 g/dL (10-25-24 @ 05:40)  Hematocrit: 28.7 % (10-25-24 @ 05:40)  Platelet Count - Automated: 110 K/uL (10-25-24 @ 05:40)  WBC Count: 4.63 K/uL (10-23-24 @ 06:06)  Hemoglobin: 8.7 g/dL (10-23-24 @ 06:06)  Hematocrit: 28.0 % (10-23-24 @ 06:06)  Platelet Count - Automated: 116 K/uL (10-23-24 @ 06:06)  WBC Count: 4.51 K/uL (10-21-24 @ 07:33)  Hemoglobin: 9.1 g/dL (10-21-24 @ 07:33)  Hematocrit: 29.4 % (10-21-24 @ 07:33)  Platelet Count - Automated: 107 K/uL (10-21-24 @ 07:33)  WBC Count: 4.62 K/uL (10-20-24 @ 07:29)  Hemoglobin: 8.9 g/dL (10-20-24 @ 07:29)  Hematocrit: 28.9 % (10-20-24 @ 07:29)  Platelet Count - Automated: 120 K/uL (10-20-24 @ 07:29)  Vitamin B12, Serum: 436 pg/mL (10-19-24 @ 07:30)  Folate, Serum: 3.5 ng/mL (10-19-24 @ 07:30)  Iron - Total Binding Capacity.: 121 ug/dL (10-19-24 @ 07:30)  Ferritin: 2642 ng/mL (10-19-24 @ 07:30)  WBC Count: 5.56 K/uL (10-19-24 @ 07:30)  Hemoglobin: 9.2 g/dL (10-19-24 @ 07:30)  Hematocrit: 29.7 % (10-19-24 @ 07:30)  Platelet Count - Automated: 144 K/uL (10-19-24 @ 07:30)  WBC Count: 5.54 K/uL (10-18-24 @ 21:00)  Hemoglobin: 9.2 g/dL (10-18-24 @ 21:00)  Hematocrit: 29.3 % (10-18-24 @ 21:00)  Platelet Count - Automated: 144 K/uL (10-18-24 @ 21:00)  WBC Count: 5.24 K/uL (10-18-24 @ 16:48)  Hemoglobin: 9.9 g/dL (10-18-24 @ 16:48)  Hematocrit: 31.5 % (10-18-24 @ 16:48)  Platelet Count - Automated: 146 K/uL (10-18-24 @ 16:48)  WBC Count: 4.12 K/uL (10-18-24 @ 11:21)  Hemoglobin: 9.5 g/dL (10-18-24 @ 11:21)  Hematocrit: 30.7 % (10-18-24 @ 11:21)  Platelet Count - Automated: 150 K/uL (10-18-24 @ 11:21)  Reticulocyte Percent: 2.6 % (10-18-24 @ 11:21)  Hemoglobin: 6.6 g/dL (10-17-24 @ 21:50)  Platelet Count - Automated: 147 K/uL (10-17-24 @ 21:50)  Hematocrit: 22.3 % (10-17-24 @ 21:50)  WBC Count: 4.96 K/uL (10-17-24 @ 21:50)      Quantitative Ig mg/dL (10-21 @ 15:18)            BLOOD SMEAR INTERPRETATION:       RADIOLOGY & ADDITIONAL STUDIES:

## 2024-10-27 NOTE — PROGRESS NOTE ADULT - ASSESSMENT
63yro female with PMHx of HTN, HLD, hypothyroidism, hx of NHL  >15 yrs back , (in remission)a s of July /2024  and cardiac myxoma with resection (approx 2020) presenting with fever, generalized weakness, dizziness, lightheadedness.    # Brain MRI showing nonenhancing lesions  ?etiology,? infectious  w/u in progress  neuro on board ,  s/p LP   MRI spines are unremarkable   ID on board     - CT A/P showed several hepatic hypodensities 0.7 cm, splenic hypodensity lesion measuring 2.3 cm   -will f/u  nathan markers   will get pet/ct as outpt    neuroSx eval for possible bx     -# Acute on Chronic normocytic anemia,   had detail w/u done including bmbx with   - c/w folic acid   - s/p  Venofer  -no signs of active bleeding   mildly elevated retic     May need repeat bmbx as outpt     - VILMA  shows no cardioembolic source of CVA.   - awaiting  for ILR placement for possible Afib.     cont other supportive care   will f/u

## 2024-10-27 NOTE — PROGRESS NOTE ADULT - ASSESSMENT
63yro female with PMHx of HTN, HLD, hypothyroidism, hx of lymphoma (in remission), and cardiac myxoma with resection (approx 2020) presenting with fever, generalized weakness, dizziness, lightheadedness.    # Brain MRI showing nonenhancing lesions however neurology does not yet significance and does not recommend bipsy - suspect needs further f/u with neuro in clinic and f/u clinically   # Per Neurology DOUBTS: demyelinating disease or space occupying lesion like malignacy to need a brain bx - no bx indicated per neuro at this time  - MRI Patchy lesion in the inferior right cerebellum with petechial hemorrhage may represent subacute infarct. started ASA and Lipitor per Neuro   - Multiple additional nonenhancing lesions throughout the bilateral periventricular white matters, splenium of the corpus callosum, and right urmila  - LP performed 10/21 extensive w/up sent most of which was able to be performed due to Quantity not sufficient for CSF per lab - this was discussed with Neurologist and recommended no need to repeat LP to run rest of tests. Neurology also does not recommend any neurosx evaluation or brain biopsy at this time as he thinks findings are non-specific. Neurologist recommends the pt to f/u at [facility] FOR FURTHER INVESTIGATION.   - EBV ++ IgG   - CSF glucose 41, CSF protein 187. No neutrophils or organisms appreciated on CSF gram stain. No growth on CSF culture. CSF PCR negative.  - CSF , AFB unable to be performed, fungal cultures no growth, crypto negative, HSV negative  - CARLA/COMPLEMENT LEVEL NL   - , , CARLA , Anti ds-DNA <1, C3/C4 , RF 10, p-ANCA/c-ANCA negative, a-ANCA indeterminate, SS-A/SS-B <0.2, ACE , TSH 5.68, T4 4.8, SPEP   - HIV negative, EBV PCR , CMV PCR negative, RPR negative, hepatitis panel nonreactive  - CT A/P showed several hepatic hypodensities 0.7 cm, splenic hypodensity lesion measuring 2.3 cm   -->  PATIENT HAD MRI DONE 2 WEEKS AGO WITH DR STEFFANIE DANG ONCOLOGIST - WHICH SHOWED NO PELVIC OR ABD LAD, ENLARGED LIVER AND SPLEEN. D/W DR ANDREWS. NO NEED TO REPEAT MRI ABD.  - MRI C and T spine w/ and w/o shows pontine and right cerebellar signal abnormalities, multilevel spondylosis and spondylolisthesis most prominent at C4-5 with spinal and left foraminal stenosis. 8mm right pulmonary nodule in right lower lobe appreciated.   - CTA of head and neck negative for pathology  - f/u TA Duplex to r/o Temporal Arteritis   - f/u EP for ILR Monday     # Acute on Chronic normocytic anemia, symptomatic   - no evidence of bleeding, SHAY neg  - hbg responded to 2 u prbc  - no evidence of hemolysis  - B12 WNL. low folate  - c/w folic acid   - Completed 5 days of Venofer  - heme/onc eval noted  - LDH elevated and direct coomb's test is negative pt likely not hemolyzing   - Elevated LDH / Hepatosplenomegally -> Rule out Recurrence of Lymphoma --> PET CT outpt w/ Dr Andrews and MARGIE Richardson     # Acute Stroke unexplained etiology at this time - f/u Pet Scan and TA Duplex and ILR f/u Tumor Markers   # Fever  # Weakness  # R sided vision changes  # Hx of non hodgkin's lymphoma  # Hx of cardiac myxoma s/p resection   - still have intermittent fever, last on 10/21 evening   - RVP negative  - BCx and UA negative  - per ID High suspicion for malignancy as monocytosis, lung findings, splenic/liver/brain findings, elevated ferritin  - Monitor off antimicrobials   - malignancy workup  - Right lower lobe nodular, f/u in 3 mo w Pulmonology   - per Ophthalmology, anticoagulation use minimized during hospital course due to extensive subretinal fluid and subretinal hemorrhages with para macular exudates in right eye suggestive of neovascular AMD process. Patient also has retinal hemorrhages, peripheral and temporal, in left eye. Recommends hematology consult due to retinal hemorrhages and low platelets.  - VILMA completed for workup of subacute strokes and hx of cardiac myxoma. Report shows no cardioembolic source of CVA.   - EP consult placed for ILR placement for possible Afib. Family is deferring decision.    # Hypothyroid  - c/w synthroid  - TSH 5.68, T4 4.8    # HTN   # HLD  - c/w metoprolol and atorvastatin      # DVT prophylaxis: Lovenox  # GI prophylaxis: PPI  # Diet: Regular  # Activity: as tolerated  # Code status: Full Code  # Disposition: acute    Pending: f/u TA Duplex / ILR Placement / OP Pet CT / Tumor Markers   Dispo: possible d/c on Monday if pending items completed

## 2024-10-27 NOTE — PROGRESS NOTE ADULT - SUBJECTIVE AND OBJECTIVE BOX
24H events:    Patient is a 63y old Female who presents with a chief complaint of Anemia (21 Oct 2024 10:02)    Primary diagnosis of Fever    Today is hospital day 10   No overnight events  I discussed the case with pt and  at bedside in detail and wrote down on paper plan and what is pending and d/c planning     PAST MEDICAL & SURGICAL HISTORY  Hypertension    Hyperlipemia    Hypothyroidism    History of lymphoma    Cardiac myxoma      SOCIAL HISTORY:  Social History:      ALLERGIES:  penicillin (Unknown)    MEDICATIONS:  STANDING MEDICATIONS  aspirin enteric coated 81 milliGRAM(s) Oral daily  atorvastatin 80 milliGRAM(s) Oral at bedtime  chlorhexidine 2% Cloths 1 Application(s) Topical <User Schedule>  enoxaparin Injectable 30 milliGRAM(s) SubCutaneous every 24 hours  folic acid 5 milliGRAM(s) Oral daily  influenza   Vaccine 0.5 milliLiter(s) IntraMuscular once  levothyroxine 50 MICROGram(s) Oral daily  metoprolol succinate ER 50 milliGRAM(s) Oral daily  pantoprazole    Tablet 40 milliGRAM(s) Oral before breakfast  polyethylene glycol 3350 17 Gram(s) Oral every 12 hours  senna 2 Tablet(s) Oral at bedtime    PRN MEDICATIONS  acetaminophen     Tablet .. 650 milliGRAM(s) Oral every 6 hours PRN  melatonin 3 milliGRAM(s) Oral at bedtime PRN    VITALS:     T(C): 36.4 (27 Oct 2024 05:09), Max: 36.8 (26 Oct 2024 14:59)  T(F): 97.6 (27 Oct 2024 05:09), Max: 98.3 (26 Oct 2024 14:59)  HR: 103 (27 Oct 2024 05:09) (74 - 103)  BP: 106/71 (27 Oct 2024 05:09) (104/70 - 130/75)  RR: 18 (27 Oct 2024 05:09) (18 - 18)  SpO2: 71% (27 Oct 2024 05:09) (71% - 98%)      PHYSICAL EXAM:  GENERAL: NAD, lying in bed comfortably  HEAD:  Atraumatic, Normocephalic  EYES: EOMI, PERRLA, conjunctiva and sclera clear  ENT: Moist mucous membranes  NECK: Supple, No JVD  CHEST/LUNG: Clear to auscultation bilaterally; No rales, rhonchi, wheezing, or rubs. Unlabored respirations  HEART: Regular rate and rhythm; No murmurs, rubs, or gallops  ABDOMEN: BSx4; Soft, nontender, nondistended  EXTREMITIES:  2+ Peripheral Pulses, brisk capillary refill. No clubbing, cyanosis, or edema  NERVOUS SYSTEM:  A&Ox3, no focal deficits   SKIN: No rashes or lesions      LABS:                          8.9    5.05  )-----------( 110      ( 25 Oct 2024 05:40 )             28.7     10-25    135  |  102  |  11  ----------------------------<  79  4.2   |  25  |  0.6[L]    Ca    7.5[L]      25 Oct 2024 05:40    Mg     2.0     10-25    Urinalysis Basic - ( 25 Oct 2024 09:36 )    Color: Dark Yellow / Appearance: Cloudy / S.023 / pH: x  Gluc: x / Ketone: Negative mg/dL  / Bili: Small / Urobili: 4.0 mg/dL   Blood: x / Protein: 30 mg/dL / Nitrite: Negative   Leuk Esterase: Trace / RBC: 10 /HPF / WBC 4 /HPF   Sq Epi: x / Non Sq Epi: 3 /HPF / Bacteria: Occasional /HPF

## 2024-10-28 ENCOUNTER — TRANSCRIPTION ENCOUNTER (OUTPATIENT)
Age: 64
End: 2024-10-28

## 2024-10-28 VITALS
OXYGEN SATURATION: 96 % | TEMPERATURE: 98 F | DIASTOLIC BLOOD PRESSURE: 50 MMHG | SYSTOLIC BLOOD PRESSURE: 121 MMHG | RESPIRATION RATE: 18 BRPM | HEART RATE: 105 BPM

## 2024-10-28 PROBLEM — E78.5 HYPERLIPIDEMIA, UNSPECIFIED: Chronic | Status: ACTIVE | Noted: 2024-10-18

## 2024-10-28 PROBLEM — I10 ESSENTIAL (PRIMARY) HYPERTENSION: Chronic | Status: ACTIVE | Noted: 2024-10-18

## 2024-10-28 PROBLEM — Z85.72 PERSONAL HISTORY OF NON-HODGKIN LYMPHOMAS: Chronic | Status: ACTIVE | Noted: 2024-10-18

## 2024-10-28 PROBLEM — E03.9 HYPOTHYROIDISM, UNSPECIFIED: Chronic | Status: ACTIVE | Noted: 2024-10-18

## 2024-10-28 LAB
COPPER SERPL-MCNC: 119 UG/DL — SIGNIFICANT CHANGE UP (ref 80–158)
PROT S FREE PPP-ACNC: 36 % — LOW (ref 63–140)

## 2024-10-28 PROCEDURE — 99239 HOSP IP/OBS DSCHRG MGMT >30: CPT

## 2024-10-28 PROCEDURE — 33285 INSJ SUBQ CAR RHYTHM MNTR: CPT

## 2024-10-28 RX ORDER — FOLIC ACID 1 MG/1
5 TABLET ORAL
Qty: 0 | Refills: 0 | DISCHARGE
Start: 2024-10-28

## 2024-10-28 RX ORDER — ICOSAPENT ETHYL 1 G/1
2 CAPSULE, LIQUID FILLED ORAL
Refills: 0 | DISCHARGE

## 2024-10-28 RX ORDER — GLYCERIN/PROPYLENE GLYCOL 0.6 %-0.6%
1 DROPPERETTE, SINGLE-USE DROP DISPENSER OPHTHALMIC (EYE)
Refills: 0 | Status: DISCONTINUED | OUTPATIENT
Start: 2024-10-28 | End: 2024-10-28

## 2024-10-28 RX ORDER — GLYCERIN/PROPYLENE GLYCOL 0.6 %-0.6%
1 DROPPERETTE, SINGLE-USE DROP DISPENSER OPHTHALMIC (EYE)
Qty: 30 | Refills: 0
Start: 2024-10-28 | End: 2024-11-10

## 2024-10-28 RX ORDER — OFLOXACIN 3 MG/ML
1 SOLUTION OPHTHALMIC
Refills: 0 | Status: DISCONTINUED | OUTPATIENT
Start: 2024-10-28 | End: 2024-10-28

## 2024-10-28 RX ORDER — OFLOXACIN 3 MG/ML
1 SOLUTION OPHTHALMIC
Qty: 30 | Refills: 0
Start: 2024-10-28 | End: 2024-11-10

## 2024-10-28 RX ADMIN — OFLOXACIN 1 DROP(S): 3 SOLUTION OPHTHALMIC at 17:14

## 2024-10-28 RX ADMIN — Medication 81 MILLIGRAM(S): at 12:39

## 2024-10-28 RX ADMIN — Medication 30 MILLIGRAM(S): at 12:38

## 2024-10-28 RX ADMIN — Medication 1 DROP(S): at 17:15

## 2024-10-28 RX ADMIN — Medication 50 MILLIGRAM(S): at 05:26

## 2024-10-28 RX ADMIN — FOLIC ACID 5 MILLIGRAM(S): 1 TABLET ORAL at 12:39

## 2024-10-28 RX ADMIN — CHLORHEXIDINE GLUCONATE 1 APPLICATION(S): 40 SOLUTION TOPICAL at 05:27

## 2024-10-28 RX ADMIN — Medication 50 MICROGRAM(S): at 05:26

## 2024-10-28 RX ADMIN — PANTOPRAZOLE SODIUM 40 MILLIGRAM(S): 40 TABLET, DELAYED RELEASE ORAL at 05:26

## 2024-10-28 NOTE — PROGRESS NOTE ADULT - SUBJECTIVE AND OBJECTIVE BOX
24H events:    Patient is a 63y old Female who presents with a chief complaint of Anemia (27 Oct 2024 14:03)    Primary diagnosis of Symptomatic anemia      Day 1:  Day 2:  Day 3:     Today is hospital day 11d. This morning patient was seen and examined at bedside, resting comfortably in bed.    No acute or major events overnight.    Code Status:    Family communication:  Contact date:  Name of person contacted:  Relationship to patient:  Communication details:  What matters most:    PAST MEDICAL & SURGICAL HISTORY  Hypertension    Hyperlipemia    Hypothyroidism    History of lymphoma    Cardiac myxoma      SOCIAL HISTORY:  Social History:      ALLERGIES:  penicillin (Unknown)    MEDICATIONS:  STANDING MEDICATIONS  aspirin enteric coated 81 milliGRAM(s) Oral daily  atorvastatin 80 milliGRAM(s) Oral at bedtime  chlorhexidine 2% Cloths 1 Application(s) Topical <User Schedule>  enoxaparin Injectable 30 milliGRAM(s) SubCutaneous every 24 hours  folic acid 5 milliGRAM(s) Oral daily  levothyroxine 50 MICROGram(s) Oral daily  metoprolol succinate ER 50 milliGRAM(s) Oral daily  pantoprazole    Tablet 40 milliGRAM(s) Oral before breakfast  polyethylene glycol 3350 17 Gram(s) Oral every 12 hours  senna 2 Tablet(s) Oral at bedtime    PRN MEDICATIONS  acetaminophen     Tablet .. 650 milliGRAM(s) Oral every 6 hours PRN  melatonin 3 milliGRAM(s) Oral at bedtime PRN    VITALS:   T(F): 98  HR: 109  BP: 126/85  RR: 18  SpO2: 98%    PHYSICAL EXAM:  GENERAL:   (  ) NAD, lying in bed comfortably     (  ) obtunded     (  ) lethargic     (  ) somnolent    HEAD:   (  ) Atraumatic     (  ) hematoma     (  ) laceration (specify location:       )     NECK:  (  ) Supple     (  ) neck stiffness     (  ) nuchal rigidity     (  )  no JVD     (  ) JVD present ( -- cm)    HEART:  Rate -->     (  ) normal rate     (  ) bradycardic     (  ) tachycardic  Rhythm -->     (  ) regular     (  ) regularly irregular     (  ) irregularly irregular  Murmurs -->     (  ) normal s1s2     (  ) systolic murmur     (  ) diastolic murmur     (  ) continuous murmur      (  ) S3 present     (  ) S4 present    LUNGS:   (  )Unlabored respirations     (  ) tachypnea  (  ) B/L air entry     (  ) decreased breath sounds in:  (location     )    (  ) no adventitious sound     (  ) crackles     (  ) wheezing      (  ) rhonchi      (specify location:       )  (  ) chest wall tenderness (specify location:       )    ABDOMEN:   (  ) Soft     (  ) tense   |   (  ) nondistended     (  ) distended   |   (  ) +BS     (  ) hypoactive bowel sounds     (  ) hyperactive bowel sounds  (  ) nontender     (  ) RUQ tenderness     (  ) RLQ tenderness     (  ) LLQ tenderness     (  ) epigastric tenderness     (  ) diffuse tenderness  (  ) Splenomegaly      (  ) Hepatomegaly      (  ) Jaundice     (  ) ecchymosis     EXTREMITIES:  (  ) Normal     (  ) Rash     (  ) ecchymosis     (  ) varicose veins      (  ) pitting edema     (  ) non-pitting edema   (  ) ulceration     (  ) gangrene:     (location:     )    NERVOUS SYSTEM:    (  ) A&Ox3     (  ) confused     (  ) lethargic  CN II-XII:     (  ) Intact     (  ) deficits found     (Specify:     )   Upper extremities:     (  ) no sensorimotor deficits     (  ) weakness     (  ) loss of proprioception/vibration     (  ) loss of touch/temperature (specify:    )  Lower extremities:     (  ) no sensorimotor deficits     (  ) weakness     (  ) loss of proprioception/vibration     (  ) loss of touch/temperature (specify:    )    SKIN:   (  ) No rashes or lesions     (  ) maculopapular rash     (  ) pustules     (  ) vesicles     (  ) ulcer     (  ) ecchymosis     (specify location:     )    AMPAC score:    (  ) Indwelling Zambrano Catheter:   Date insterted:    Reason (  ) Critical illness     (  ) urinary retention    (  ) Accurate Ins/Outs Monitoring     (  ) CMO patient    (  ) Central Line:   Date inserted:  Location: (  ) Right IJ     (  ) Left IJ     (  ) Right Fem     (  ) Left Fem    (  ) SPC        (  ) pigtail       (  ) PEG tube       (  ) colostomy       (  ) jejunostomy  (  ) U-Dall    LABS:                        RADIOLOGY:           24H events:    Patient is a 63y old Female who presents with a chief complaint of Anemia (27 Oct 2024 14:03)  Primary diagnosis of Symptomatic anemia  Today is hospital day 11d. This morning patient was seen and examined at bedside, resting comfortably in bed.    No acute or major events overnight.    Code Status:    Family communication:  Contact date:  Name of person contacted:  Relationship to patient:  Communication details:  What matters most:    PAST MEDICAL & SURGICAL HISTORY  Hypertension    Hyperlipemia    Hypothyroidism    History of lymphoma    Cardiac myxoma      SOCIAL HISTORY:  Social History:      ALLERGIES:  penicillin (Unknown)    MEDICATIONS:  STANDING MEDICATIONS  aspirin enteric coated 81 milliGRAM(s) Oral daily  atorvastatin 80 milliGRAM(s) Oral at bedtime  chlorhexidine 2% Cloths 1 Application(s) Topical <User Schedule>  enoxaparin Injectable 30 milliGRAM(s) SubCutaneous every 24 hours  folic acid 5 milliGRAM(s) Oral daily  levothyroxine 50 MICROGram(s) Oral daily  metoprolol succinate ER 50 milliGRAM(s) Oral daily  pantoprazole    Tablet 40 milliGRAM(s) Oral before breakfast  polyethylene glycol 3350 17 Gram(s) Oral every 12 hours  senna 2 Tablet(s) Oral at bedtime    PRN MEDICATIONS  acetaminophen     Tablet .. 650 milliGRAM(s) Oral every 6 hours PRN  melatonin 3 milliGRAM(s) Oral at bedtime PRN    VITALS:   T(F): 98  HR: 109  BP: 126/85  RR: 18  SpO2: 98%    PHYSICAL EXAM:    GENERAL: NAD, lying in bed comfortably  HEAD:  Atraumatic, Normocephalic  EYES: EOMI, PERRLA, conjunctiva and sclera clear  ENT: Moist mucous membranes  NECK: Supple, No JVD  CHEST/LUNG: Clear to auscultation bilaterally; No rales, rhonchi, wheezing, or rubs. Unlabored respirations  HEART: Regular rate and rhythm; No murmurs, rubs, or gallops  ABDOMEN: BSx4; Soft, nontender, nondistended  EXTREMITIES:  2+ Peripheral Pulses, brisk capillary refill. No clubbing, cyanosis, or edema  NERVOUS SYSTEM:  A&Ox3, no focal deficits         (  ) Indwelling Zambrano Catheter:   Date insterted:    Reason (  ) Critical illness     (  ) urinary retention    (  ) Accurate Ins/Outs Monitoring     (  ) CMO patient    (  ) Central Line:   Date inserted:  Location: (  ) Right IJ     (  ) Left IJ     (  ) Right Fem     (  ) Left Fem    (  ) SPC        (  ) pigtail       (  ) PEG tube       (  ) colostomy       (  ) jejunostomy  (  ) U-Dall    LABS:                        RADIOLOGY:

## 2024-10-28 NOTE — CHART NOTE - NSCHARTNOTEFT_GEN_A_CORE
Electrophysiology Brief Post-Op Note    I have personally seen and examined the patient.  I agree with the history and physical which I have reviewed and noted any changes below.  10-28-24 @ 10:45am    PRE-OP DIAGNOSIS: CVA    POST-OP DIAGNOSIS: CVA    PROCEDURE: Loop Implant    Physician: Dr Ruff  Assistant: Adis Beckman PA-C    ESTIMATED BLOOD LOSS:  2   mL    ANESTHESIA TYPE:  [  ]General Anesthesia  [  ] Sedation  [X  ] Local/Regional    CONDITION  [  ] Critical  [  ] Serious  [  ]Fair  [ X ]Good      SPECIMENS REMOVED (IF APPLICABLE):  none    IMPLANTS (IF APPLICABLE)  Loop    FINDINGS: None    PLAN OF CARE  - Remove bandaid tomorrow  - FU 3-4 weeks

## 2024-10-28 NOTE — PROGRESS NOTE ADULT - ASSESSMENT
63yro female with PMHx of HTN, HLD, hypothyroidism, hx of lymphoma (in remission), and cardiac myxoma with resection (approx 2020) presenting with fever, generalized weakness, dizziness, lightheadedness.    # Brain MRI showing nonenhancing lesions however neurology does not yet significance and does not recommend bipsy - suspect needs further f/u with neuro in clinic and f/u clinically   # Per Neurology DOUBTS: demyelinating disease or space occupying lesion like malignacy to need a brain bx - no bx indicated per neuro at this time  - MRI Patchy lesion in the inferior right cerebellum with petechial hemorrhage may represent subacute infarct. started ASA and Lipitor per Neuro   - Multiple additional nonenhancing lesions throughout the bilateral periventricular white matters, splenium of the corpus callosum, and right urmila  - LP performed 10/21 extensive w/up sent most of which was able to be performed due to Quantity not sufficient for CSF per lab - this was discussed with Neurologist and recommended no need to repeat LP to run rest of tests. Neurology also does not recommend any neurosx evaluation or brain biopsy at this time as he thinks findings are non-specific. Neurologist recommends the pt to f/u at [facility] FOR FURTHER INVESTIGATION.   - EBV ++ IgG   - CSF glucose 41, CSF protein 187. No neutrophils or organisms appreciated on CSF gram stain. No growth on CSF culture. CSF PCR negative.  - CSF , AFB unable to be performed, fungal cultures no growth, crypto negative, HSV negative  - CARLA/COMPLEMENT LEVEL NL   - , , CARLA , Anti ds-DNA <1, C3/C4 , RF 10, p-ANCA/c-ANCA negative, a-ANCA indeterminate, SS-A/SS-B <0.2, ACE , TSH 5.68, T4 4.8, SPEP   - HIV negative, EBV PCR , CMV PCR negative, RPR negative, hepatitis panel nonreactive  - CT A/P showed several hepatic hypodensities 0.7 cm, splenic hypodensity lesion measuring 2.3 cm   -->  PATIENT HAD MRI DONE 2 WEEKS AGO WITH DR STEFFANIE DANG ONCOLOGIST - WHICH SHOWED NO PELVIC OR ABD LAD, ENLARGED LIVER AND SPLEEN. D/W DR ANDREWS. NO NEED TO REPEAT MRI ABD.  - MRI C and T spine w/ and w/o shows pontine and right cerebellar signal abnormalities, multilevel spondylosis and spondylolisthesis most prominent at C4-5 with spinal and left foraminal stenosis. 8mm right pulmonary nodule in right lower lobe appreciated.   - CTA of head and neck negative for pathology  - f/u TA Duplex to r/o Temporal Arteritis   - s/p ILR by EP today      # Acute on Chronic normocytic anemia, symptomatic   - no evidence of bleeding, SHAY neg  - hbg responded to 2 u prbc  - no evidence of hemolysis  - B12 WNL. low folate  - c/w folic acid   - Completed 5 days of Venofer  - heme/onc eval noted  - LDH elevated and direct coomb's test is negative pt likely not hemolyzing   - Elevated LDH / Hepatosplenomegally -> Rule out Recurrence of Lymphoma --> PET CT outpt w/ Dr Andrews and MARGIE Richardson     # Acute Stroke unexplained etiology at this time - f/u Pet Scan and TA Duplex and ILR f/u Tumor Markers   # Fever  # Weakness  # R sided vision changes  # Hx of non hodgkin's lymphoma  # Hx of cardiac myxoma s/p resection   - still have intermittent fever, last on 10/21 evening   - RVP negative  - BCx and UA negative  - per ID High suspicion for malignancy as monocytosis, lung findings, splenic/liver/brain findings, elevated ferritin  - Monitor off antimicrobials   - malignancy workup  - Right lower lobe nodular, f/u in 3 mo w Pulmonology   - per Ophthalmology, anticoagulation use minimized during hospital course due to extensive subretinal fluid and subretinal hemorrhages with para macular exudates in right eye suggestive of neovascular AMD process. Patient also has retinal hemorrhages, peripheral and temporal, in left eye. Recommends hematology consult due to retinal hemorrhages and low platelets.  - VILMA completed for workup of subacute strokes and hx of cardiac myxoma. Report shows no cardioembolic source of CVA.   - EP consult placed for ILR placement for possible Afib. Family is deferring decision.    # Hypothyroid  - c/w synthroid  - TSH 5.68, T4 4.8    # HTN   # HLD  - c/w metoprolol and atorvastatin    #Right Eye Conjuctivitis   - Suspecting infection etiology   - Start: Ofloxcin or Ciprodex 1 drop Right Eye BID and Artificial Tears q4/prn     # DVT prophylaxis: Lovenox  # GI prophylaxis: PPI  # Diet: Regular  # Activity: as tolerated  # Code status: Full Code  # Disposition: acute    Pending: f/u TA Duplex - need before d/c / f/u clinic Pet CT / f/u clinic Tumor Markers / Improvement in Rt Eye Conjuctivitis   Dispo: Anticipate for d/c on Tuesday if pending items completed

## 2024-10-28 NOTE — PROGRESS NOTE ADULT - PROVIDER SPECIALTY LIST ADULT
Hospitalist
Hospitalist
Internal Medicine
Heme/Onc
Hospitalist
Internal Medicine
Hospitalist
Hospitalist
Internal Medicine
Heme/Onc
Heme/Onc
Hospitalist
Internal Medicine
Neurology

## 2024-10-28 NOTE — PROGRESS NOTE ADULT - SUBJECTIVE AND OBJECTIVE BOX
24H events:    Patient is a 63y old Female who presents with a chief complaint of Anemia (21 Oct 2024 10:02)    Primary diagnosis of Fever    Today is hospital day 11   Right Eye redness - no pain + conjunctival injection - no vision changes   Updated  and pt on plan and discussed d/c planning     PAST MEDICAL & SURGICAL HISTORY  Hypertension    Hyperlipemia    Hypothyroidism    History of lymphoma    Cardiac myxoma      SOCIAL HISTORY:  Social History:      ALLERGIES:  penicillin (Unknown)    MEDICATIONS:  STANDING MEDICATIONS  aspirin enteric coated 81 milliGRAM(s) Oral daily  atorvastatin 80 milliGRAM(s) Oral at bedtime  chlorhexidine 2% Cloths 1 Application(s) Topical <User Schedule>  enoxaparin Injectable 30 milliGRAM(s) SubCutaneous every 24 hours  folic acid 5 milliGRAM(s) Oral daily  influenza   Vaccine 0.5 milliLiter(s) IntraMuscular once  levothyroxine 50 MICROGram(s) Oral daily  metoprolol succinate ER 50 milliGRAM(s) Oral daily  pantoprazole    Tablet 40 milliGRAM(s) Oral before breakfast  polyethylene glycol 3350 17 Gram(s) Oral every 12 hours  senna 2 Tablet(s) Oral at bedtime    PRN MEDICATIONS  acetaminophen     Tablet .. 650 milliGRAM(s) Oral every 6 hours PRN  melatonin 3 milliGRAM(s) Oral at bedtime PRN    VITALS:     T(C): 36.7 (28 Oct 2024 05:06), Max: 37.3 (27 Oct 2024 15:13)  T(F): 98 (28 Oct 2024 05:06), Max: 99.1 (27 Oct 2024 15:13)  HR: 109 (28 Oct 2024 05:06) (99 - 109)  BP: 126/85 (28 Oct 2024 05:06) (114/77 - 127/79)  RR: 18 (28 Oct 2024 05:06) (18 - 19)  SpO2: 98% (28 Oct 2024 05:06) (98% - 98%)    Parameters below as of 28 Oct 2024 05:06  Patient On (Oxygen Delivery Method): room air      PHYSICAL EXAM:  GENERAL: NAD, lying in bed comfortably  HEAD:  Atraumatic, Normocephalic  EYES: EOMI, PERRLA, conjunctiva and sclera clear  ENT: Moist mucous membranes  NECK: Supple, No JVD  CHEST/LUNG: Clear to auscultation bilaterally; No rales, rhonchi, wheezing, or rubs. Unlabored respirations  HEART: Regular rate and rhythm; No murmurs, rubs, or gallops  ABDOMEN: BSx4; Soft, nontender, nondistended  EXTREMITIES:  2+ Peripheral Pulses, brisk capillary refill. No clubbing, cyanosis, or edema  NERVOUS SYSTEM:  A&Ox3, no focal deficits   SKIN: No rashes or lesions      LABS:                        8.9    5.05  )-----------( 110      ( 25 Oct 2024 05:40 )             28.7     10-25    135  |  102  |  11  ----------------------------<  79  4.2   |  25  |  0.6[L]    Ca    7.5[L]      25 Oct 2024 05:40    Mg     2.0     10-25    Urinalysis Basic - ( 25 Oct 2024 09:36 )    Color: Dark Yellow / Appearance: Cloudy / S.023 / pH: x  Gluc: x / Ketone: Negative mg/dL  / Bili: Small / Urobili: 4.0 mg/dL   Blood: x / Protein: 30 mg/dL / Nitrite: Negative   Leuk Esterase: Trace / RBC: 10 /HPF / WBC 4 /HPF   Sq Epi: x / Non Sq Epi: 3 /HPF / Bacteria: Occasional /HPF

## 2024-10-28 NOTE — PROGRESS NOTE ADULT - TIME BILLING
direct pt care and interdisciplinary rounds / coordination of care with heme/family
direct pt care and interdisciplinary rounds / deep discussion with family at bedside and pt coordinating care and discussing d/c planning and pending rests and providing all tests interpretation
direct pt care and interdsiciplinary rounds / chart reviewed / coordination of care with EP/Neuro/Oncology

## 2024-10-28 NOTE — PROGRESS NOTE ADULT - ASSESSMENT
63yro female with PMHx of HTN, HLD, hypothyroidism, hx of lymphoma (in remission), and cardiac myxoma with resection (approx 2020) presenting with fever, generalized weakness, dizziness, lightheadedness.    #Fever  #Generalized weakness, dizziness, lightheadedness.  - Brain MRI showing nonenhancing lesions however neurology does not yet significance and does not recommend biopsy - suspect needs further f/u with neuro in clinic and f/u clinically -  -  Per Neurology DOUBTS: demyelinating disease or space occupying lesion like malignancy to need a brain bx - no bx indicated per neuro at this time  - MRI BRAIN --- Patchy lesion in the inferior right cerebellum with petechial hemorrhage may represent subacute infarct. started  ASA and Lipitor per Neuro   - Multiple additional nonenhancing lesions throughout the bilateral periventricular white matters, splenium of the corpus callosum, and right urmila  - LP performed 10/21 extensive w/up sent most of which was un able to be performed due to Quantity not sufficient for CSF per lab - this was discussed with Neurologist and recommended no need to repeat LP to run rest of tests. Neurology also does not recommend any neurosx evaluation or brain biopsy at this time as he thinks findings are non-specific. Neurologist recommends the pt to f/u at [facility] CL[facility] FOR FURTHER INVESTIGATION.   - EBV ++ IgG   - CSF glucose 41, CSF protein 187. No neutrophils or organisms appreciated on CSF gram stain. No growth on CSF culture. CSF PCR negative.  - CSF , AFB unable to be performed, fungal cultures no growth, crypto negative, HSV negative  - CARLA/COMPLEMENT LEVEL NL   - , , CARLA , Anti ds-DNA <1, C3/C4 , RF 10, p-ANCA/c-ANCA negative, a-ANCA indeterminate, SS-A/SS-B <0.2, ACE , TSH 5.68, T4 4.8, SPEP   - HIV negative, EBV PCR , CMV PCR negative, RPR negative, hepatitis panel nonreactive  - CT A/P showed several hepatic hypo densities 0.7 cm, splenic hypodensity lesion measuring 2.3 cm.    -->  PATIENT HAD MRI DONE 2 WEEKS AGO WITH DR STEFFANIE DANG ONCOLOGIST - WHICH SHOWED NO PELVIC OR ABD LAD, ENLARGED LIVER AND SPLEEN. D/W DR ANDREWS. NO NEED TO REPEAT MRI ABD.  - MRI C and T spine w/ and w/o shows pontine and right cerebellar signal abnormalities, multilevel spondylosis and spondylolisthesis most prominent at C4-5 with spinal and left foraminal stenosis. 8mm right pulmonary nodule in right lower lobe appreciated.   - CTA of head and neck negative for pathology  - f/u TA Duplex to r/o Temporal Arteritis --- negative for arteritis   - f/u EP for ILR    # Acute on Chronic normocytic anemia, symptomatic   - no evidence of bleeding, SHAY neg  - hbg responded to 2 u prbc  - no evidence of hemolysis  - B12 WNL. low folate  - c/w folic acid   - Completed 5 days of Venofer  - heme/onc eval noted  - LDH elevated and direct fransisco test is negative , pt likely not hemolyzing   - Elevated LDH / Hepat splenomegaly  -> Rule out Recurrence of Lymphoma --> PET CT outpt w/ Dr Andrews and MARGIE Richardson     # Acute Stroke unexplained etiology at this time   # Fever  # Weakness  # R sided vision changes  # Hx of non hodgkin's lymphoma  # Hx of cardiac myxoma s/p resection   - still have intermittent fever, last on 10/21 evening   - RVP negative  - BCx and UA negative  - per ID High suspicion for malignancy as monocytosis, lung findings, splenic/liver/brain findings, elevated ferritin  - Monitor off antimicrobials   - malignancy workup  - Right lower lobe nodular, f/u in 3 mo w Pulmonology   - per Ophthalmology, anticoagulation use minimized during hospital course due to extensive subretinal fluid and subretinal hemorrhages with para macular exudates in right eye suggestive of neovascular AMD process. Patient also has retinal hemorrhages, peripheral and temporal, in left eye. Recommends hematology consult due to retinal hemorrhages and low platelets.  - VILMA completed for workup of subacute strokes and hx of cardiac myxoma. Report shows no cardioembolic source of CVA.   - EP consult placed for ILR placement for possible Afib.     #Hypothyroid  - c/w synthroid  - TSH 5.68, T4 4.8    #HTN   #HLD  - c/w metoprolol and atorvastatin      # DVT prophylaxis: Lovenox  # GI prophylaxis: PPI  # Diet: Regular  # Activity: as tolerated  # Code status: Full Code  # Disposition: acute    Pending --- > ILR Placement / OP Pet scan  / Tumor Markers , outpt neurology follow up,

## 2024-10-28 NOTE — DISCHARGE NOTE NURSING/CASE MANAGEMENT/SOCIAL WORK - FINANCIAL ASSISTANCE
[facility] provides services at a reduced cost to those who are determined to be eligible through [facility]’s financial assistance program. Information regarding [facility]’s financial assistance program can be found by going to [email or web address] or by calling [phone].

## 2024-10-28 NOTE — DISCHARGE NOTE NURSING/CASE MANAGEMENT/SOCIAL WORK - NSDCFUADDAPPT_GEN_ALL_CORE_FT
Do you need a primary care doctor or follow-up with a specialist? Our care coordinators will help you find providers near you and schedule any follow-up care visits.    Monday-Friday: 9am-5pm    Call our [facility] team: [phone]

## 2024-10-28 NOTE — DISCHARGE NOTE NURSING/CASE MANAGEMENT/SOCIAL WORK - NSDCPEFALRISK_GEN_ALL_CORE
For information on Fall & Injury Prevention, visit: [email or web address] OR  [email or web address] OR  https://www.cdc.gov/steadi/patient.html

## 2024-10-28 NOTE — DISCHARGE NOTE NURSING/CASE MANAGEMENT/SOCIAL WORK - PATIENT PORTAL LINK FT
You can access the FollowMyHealth Patient Portal offered by [facility] by registering at the following website: [email or web address]. By joining [facility]’s FollowMyHealth portal, you will also be able to view your health information using other applications (apps) compatible with our system.

## 2024-10-29 LAB
DRVVT RATIO: 0.98 RATIO — SIGNIFICANT CHANGE UP (ref 0–1.21)
DRVVT SCREEN TO CONFIRM RATIO: SIGNIFICANT CHANGE UP
KAPPA LC SER QL IFE: 13.03 MG/DL — HIGH (ref 0.33–1.94)
KAPPA/LAMBDA FREE LIGHT CHAIN RATIO, SERUM: 1.06 RATIO — SIGNIFICANT CHANGE UP (ref 0.26–1.65)
LAMBDA LC SER QL IFE: 12.35 MG/DL — HIGH (ref 0.57–2.63)
NORMALIZED SCT PPP-RTO: 0.53 RATIO — SIGNIFICANT CHANGE UP (ref 0–1.16)
NORMALIZED SCT PPP-RTO: SIGNIFICANT CHANGE UP
PROT C ACT/NOR PPP: 58 % — LOW (ref 65–129)

## 2024-10-29 RX ORDER — ASPIRIN/MAG CARB/ALUMINUM AMIN 325 MG
0 TABLET ORAL
Refills: 0 | DISCHARGE

## 2024-10-29 RX ORDER — ASPIRIN/MAG CARB/ALUMINUM AMIN 325 MG
1 TABLET ORAL
Qty: 30 | Refills: 0
Start: 2024-10-29 | End: 2024-11-27

## 2024-10-29 RX ORDER — LEVOTHYROXINE SODIUM 88 MCG
1 TABLET ORAL
Refills: 0 | DISCHARGE

## 2024-10-29 RX ORDER — OMEPRAZOLE 10 MG
1 CAPSULE,DELAYED RELEASE (ENTERIC COATED) ORAL
Refills: 0 | DISCHARGE

## 2024-10-29 RX ORDER — METOPROLOL TARTRATE 50 MG
1 TABLET ORAL
Refills: 0 | DISCHARGE

## 2024-10-29 RX ORDER — LEVOTHYROXINE SODIUM 88 MCG
1 TABLET ORAL
Qty: 30 | Refills: 0
Start: 2024-10-29 | End: 2024-11-27

## 2024-10-29 RX ORDER — FOLIC ACID 1 MG/1
5 TABLET ORAL
Qty: 150 | Refills: 0
Start: 2024-10-29 | End: 2024-11-27

## 2024-10-29 RX ORDER — METOPROLOL TARTRATE 50 MG
1 TABLET ORAL
Qty: 30 | Refills: 0
Start: 2024-10-29 | End: 2024-11-27

## 2024-10-29 RX ORDER — OMEPRAZOLE 10 MG
1 CAPSULE,DELAYED RELEASE (ENTERIC COATED) ORAL
Qty: 30 | Refills: 0
Start: 2024-10-29 | End: 2024-11-27

## 2024-10-30 PROBLEM — Z00.00 ENCOUNTER FOR PREVENTIVE HEALTH EXAMINATION: Status: ACTIVE | Noted: 2024-10-30

## 2024-10-30 LAB
% ALBUMIN: 27.2 % — SIGNIFICANT CHANGE UP
% ALPHA 1: 7.4 % — SIGNIFICANT CHANGE UP
% ALPHA 2: 8.5 % — SIGNIFICANT CHANGE UP
% BETA: 16.2 % — SIGNIFICANT CHANGE UP
% GAMMA: 40.7 % — SIGNIFICANT CHANGE UP
% M SPIKE: SIGNIFICANT CHANGE UP
ALBUMIN SERPL ELPH-MCNC: 1.8 G/DL — LOW (ref 3.6–5.5)
ALBUMIN/GLOB SERPL ELPH: 0.4 RATIO — SIGNIFICANT CHANGE UP
ALPHA1 GLOB SERPL ELPH-MCNC: 0.5 G/DL — HIGH (ref 0.1–0.4)
ALPHA2 GLOB SERPL ELPH-MCNC: 0.6 G/DL — SIGNIFICANT CHANGE UP (ref 0.5–1)
B-GLOBULIN SERPL ELPH-MCNC: 1.1 G/DL — HIGH (ref 0.5–1)
GAMMA GLOBULIN: 2.7 G/DL — HIGH (ref 0.6–1.6)
M-SPIKE: SIGNIFICANT CHANGE UP (ref 0–0)
PROT PATTERN SERPL ELPH-IMP: SIGNIFICANT CHANGE UP
PROT SERPL-MCNC: 6.6 G/DL — SIGNIFICANT CHANGE UP (ref 6–8.3)

## 2024-11-01 ENCOUNTER — TRANSCRIPTION ENCOUNTER (OUTPATIENT)
Age: 64
End: 2024-11-01

## 2024-11-01 LAB
AMPA-RECEPTOR ANTIBODY BY CBA, SERUM: NEGATIVE — SIGNIFICANT CHANGE UP
AMPHIPHYSIN AB TITR SER: NEGATIVE — SIGNIFICANT CHANGE UP
CASPR2-IGG CBA, SERUM: NEGATIVE — SIGNIFICANT CHANGE UP
CRMP-5-IGG WESTERN BLOT: NEGATIVE — SIGNIFICANT CHANGE UP
GABA-B-RECEPTOR ANTIBODY BY CBA, SERUM: NEGATIVE — SIGNIFICANT CHANGE UP
GAD65 AB SER-MCNC: 0.04 NMOL/L — HIGH
GFAP ALPHA IGG SER QL IF: NEGATIVE — SIGNIFICANT CHANGE UP
GLIAL NUC TYPE 1 AB TITR SER: NEGATIVE — SIGNIFICANT CHANGE UP
HU1 AB TITR SER: NEGATIVE — SIGNIFICANT CHANGE UP
HU2 AB TITR SER IF: NEGATIVE — SIGNIFICANT CHANGE UP
HU3 AB TITR SER: NEGATIVE — SIGNIFICANT CHANGE UP
IFA NOTES: SIGNIFICANT CHANGE UP
IMMUNOLOGIST REVIEW: SIGNIFICANT CHANGE UP
LGI1-IGG CBA, SERUM: NEGATIVE — SIGNIFICANT CHANGE UP
MGLUR1 IGG SER QL IF: NEGATIVE — SIGNIFICANT CHANGE UP
NEUROCHONDRIN AB SERPL QL IF: NEGATIVE — SIGNIFICANT CHANGE UP
NMDAR1 IGG SER QL CBA IFA: NEGATIVE — SIGNIFICANT CHANGE UP
PCA-1 AB TITR SER: NEGATIVE — SIGNIFICANT CHANGE UP
PCA-2 AB TITR SER: NEGATIVE — SIGNIFICANT CHANGE UP
PCA-TR AB TITR SER: NEGATIVE — SIGNIFICANT CHANGE UP

## 2024-11-02 DIAGNOSIS — H10.9 UNSPECIFIED CONJUNCTIVITIS: ICD-10-CM

## 2024-11-02 DIAGNOSIS — Z79.82 LONG TERM (CURRENT) USE OF ASPIRIN: ICD-10-CM

## 2024-11-02 DIAGNOSIS — Z88.0 ALLERGY STATUS TO PENICILLIN: ICD-10-CM

## 2024-11-02 DIAGNOSIS — E87.20 ACIDOSIS, UNSPECIFIED: ICD-10-CM

## 2024-11-02 DIAGNOSIS — E03.9 HYPOTHYROIDISM, UNSPECIFIED: ICD-10-CM

## 2024-11-02 DIAGNOSIS — E78.5 HYPERLIPIDEMIA, UNSPECIFIED: ICD-10-CM

## 2024-11-02 DIAGNOSIS — Z85.72 PERSONAL HISTORY OF NON-HODGKIN LYMPHOMAS: ICD-10-CM

## 2024-11-02 DIAGNOSIS — I10 ESSENTIAL (PRIMARY) HYPERTENSION: ICD-10-CM

## 2024-11-02 DIAGNOSIS — I63.9 CEREBRAL INFARCTION, UNSPECIFIED: ICD-10-CM

## 2024-11-02 DIAGNOSIS — D64.9 ANEMIA, UNSPECIFIED: ICD-10-CM

## 2024-11-02 DIAGNOSIS — I61.4 NONTRAUMATIC INTRACEREBRAL HEMORRHAGE IN CEREBELLUM: ICD-10-CM

## 2024-11-02 DIAGNOSIS — R90.82 WHITE MATTER DISEASE, UNSPECIFIED: ICD-10-CM

## 2024-11-02 DIAGNOSIS — R91.1 SOLITARY PULMONARY NODULE: ICD-10-CM

## 2024-11-02 DIAGNOSIS — H35.63 RETINAL HEMORRHAGE, BILATERAL: ICD-10-CM

## 2024-11-02 DIAGNOSIS — R16.2 HEPATOMEGALY WITH SPLENOMEGALY, NOT ELSEWHERE CLASSIFIED: ICD-10-CM

## 2024-11-02 DIAGNOSIS — Z79.890 HORMONE REPLACEMENT THERAPY: ICD-10-CM

## 2024-11-02 DIAGNOSIS — Z79.899 OTHER LONG TERM (CURRENT) DRUG THERAPY: ICD-10-CM

## 2024-11-02 DIAGNOSIS — M43.02 SPONDYLOLYSIS, CERVICAL REGION: ICD-10-CM

## 2024-11-13 ENCOUNTER — APPOINTMENT (OUTPATIENT)
Dept: ELECTROPHYSIOLOGY | Facility: CLINIC | Age: 64
End: 2024-11-13

## 2024-11-19 ENCOUNTER — APPOINTMENT (OUTPATIENT)
Dept: NEUROLOGY | Facility: CLINIC | Age: 64
End: 2024-11-19

## 2024-11-20 LAB
CULTURE RESULTS: SIGNIFICANT CHANGE UP
SPECIMEN SOURCE: SIGNIFICANT CHANGE UP

## 2024-12-16 ENCOUNTER — APPOINTMENT (OUTPATIENT)
Dept: CARDIOLOGY | Facility: CLINIC | Age: 64
End: 2024-12-16